# Patient Record
Sex: FEMALE | Race: WHITE | NOT HISPANIC OR LATINO | Employment: FULL TIME | URBAN - METROPOLITAN AREA
[De-identification: names, ages, dates, MRNs, and addresses within clinical notes are randomized per-mention and may not be internally consistent; named-entity substitution may affect disease eponyms.]

---

## 2017-01-08 ENCOUNTER — OFFICE VISIT (OUTPATIENT)
Dept: URGENT CARE | Facility: PHYSICIAN GROUP | Age: 57
End: 2017-01-08
Payer: COMMERCIAL

## 2017-01-08 VITALS
HEART RATE: 77 BPM | OXYGEN SATURATION: 95 % | RESPIRATION RATE: 18 BRPM | SYSTOLIC BLOOD PRESSURE: 124 MMHG | TEMPERATURE: 97.8 F | WEIGHT: 173 LBS | BODY MASS INDEX: 31.83 KG/M2 | HEIGHT: 62 IN | DIASTOLIC BLOOD PRESSURE: 88 MMHG

## 2017-01-08 DIAGNOSIS — J40 BRONCHITIS: ICD-10-CM

## 2017-01-08 PROCEDURE — 99214 OFFICE O/P EST MOD 30 MIN: CPT | Performed by: EMERGENCY MEDICINE

## 2017-01-08 RX ORDER — SOLIFENACIN SUCCINATE 5 MG/1
10 TABLET, FILM COATED ORAL DAILY
COMMUNITY
End: 2017-06-12 | Stop reason: SDUPTHER

## 2017-01-08 RX ORDER — METHYLPREDNISOLONE 4 MG/1
TABLET ORAL
Qty: 21 TAB | Refills: 0 | Status: SHIPPED | OUTPATIENT
Start: 2017-01-08 | End: 2017-04-03

## 2017-01-08 RX ORDER — BENZONATATE 100 MG/1
100 CAPSULE ORAL 3 TIMES DAILY PRN
Qty: 60 CAP | Refills: 0 | Status: SHIPPED | OUTPATIENT
Start: 2017-01-08 | End: 2017-02-16

## 2017-01-08 RX ORDER — AZITHROMYCIN 250 MG/1
TABLET, FILM COATED ORAL
Qty: 6 TAB | Refills: 0 | Status: SHIPPED | OUTPATIENT
Start: 2017-01-08 | End: 2017-04-03

## 2017-01-08 ASSESSMENT — ENCOUNTER SYMPTOMS
SORE THROAT: 1
NERVOUS/ANXIOUS: 0
WEIGHT LOSS: 0
CHILLS: 0
EYE DISCHARGE: 0
SPUTUM PRODUCTION: 1
COUGH: 1
SENSORY CHANGE: 0
FEVER: 0
NAUSEA: 0
HEARTBURN: 0
PALPITATIONS: 0
EYE PAIN: 0
HEADACHES: 0
MYALGIAS: 0
SHORTNESS OF BREATH: 0
SWEATS: 0
NECK PAIN: 0

## 2017-01-08 NOTE — Clinical Note
January 8, 2017        Koki Townsend  [facility] [place] [address]        Dear Koki:    Please ask to be allowed to stay home from  work for the next two days,    If you have any questions or concerns, please don't hesitate to call.        Sincerely,        SHERRY Beyer M.D.    Electronically Signed

## 2017-01-08 NOTE — MR AVS SNAPSHOT
"Koki Townsend   2017 9:00 AM   Office Visit   MRN: [id number]    Department:  [facility]   Dept Phone:  [phone]    Description:  Female : 1960   Provider:  SHERRY Beyer M.D.           Reason for Visit     Cough with loss of voice, congestion, headache x 3 weeks       Allergies as of 2017     Allergen Noted Reactions    Codeine 2012   Itching      You were diagnosed with     Bronchitis   [002492]         Vital Signs     Blood Pressure Pulse Temperature Respirations Height Weight    124/88 mmHg 77 36.6 °C (97.8 °F) 18 1.575 m (5' 2\") 78.472 kg (173 lb)    Body Mass Index Oxygen Saturation Smoking Status             31.63 kg/m2 95% Passive Smoke Exposure - Never Smoker         Basic Information     Date Of Birth Sex Race Ethnicity Preferred Language    1960 Female White Non- English      Your appointments     2017  7:45 PM   Sleep Study Diagnostic with SLEEP TECH   [facility] (--)    [address]  [place] [id number] [phone]            2017  3:20 PM   Follow UP with DES Mario   [facility] (--)    [address]  [place] [id number] [phone]              Problem List              ICD-10-CM Priority Class Noted - Resolved    Impaired fasting blood sugar R73.01   2015 - Present    CKD (chronic kidney disease) stage 3, GFR 30-59 ml/min N18.3   2015 - Present    Other specified hypothyroidism E03.8   2015 - Present    Essential hypertension I10   2015 - Present    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 Williamson)    IMM DTaP/Tdap/Td Vaccine (2 - Td) 12/7/2025 12/7/2015            Current Immunizations     Influenza TIV (IM) 9/23/2016, 11/27/2014, 11/15/2013    Influenza Vaccine Quad Inj (Preserved) 10/26/2015    Tdap Vaccine 12/7/2015      Below and/or attached are the medications your provider expects you to take. Review all of your home medications and newly ordered medications with your provider and/or pharmacist. Follow medication instructions as directed by your provider and/or pharmacist. Please keep your medication list with you and share with your provider. Update the information when medications are discontinued, doses are changed, or new medications (including over-the-counter products) are added; and carry medication information at all times in the event of emergency situations     Allergies:  CODEINE - Itching               Medications  Valid as of: January 08, 2017 -  9:23 AM    Generic Name Brand Name Tablet Size Instructions for use    Azithromycin (Tab) ZITHROMAX 250 MG uad        Benzonatate (Cap) TESSALON 100 MG Take 1 Cap by mouth 3 times a day as needed for Cough.        Cholecalciferol (Cap) Vitamin D3 2000 UNIT Take  by mouth every day.        Enalapril-Hydrochlorothiazide (Tab) VASERETIC 10-25 MG Take 0.5 Tabs by mouth every day.        Escitalopram Oxalate (Tab) LEXAPRO 20 MG Take 1 Tab by mouth every day.        Fluticasone Propionate (Suspension) FLONASE 50 MCG/ACT Spray 2 Sprays in nose every day.        Levothyroxine Sodium (Tab) SYNTHROID 100 MCG TAKE 1 TABLET BY MOUTH EVERY DAY        LORazepam (Tab) ATIVAN 1 MG Take 1 Tab by mouth at bedtime as needed for Anxiety.        MethylPREDNISolone (Tablet Therapy Pack) MEDROL DOSEPAK 4 MG uad        Simvastatin (Tab) ZOCOR 20 MG Take 1 Tab by mouth every evening.        Solifenacin Succinate (Tab) VESICARE 5 MG Take 10 mg by mouth every day.        Zolpidem Tartrate (Tab) AMBIEN 5 MG Take 1-2  tablets by mouth every evening as needed for insomnia. Bring to sleep study.        .                 Medicines prescribed today were sent to:     [id number]  [place], [place] - [address] AT [facility] [address]    [address] [place] [address]    Phone: [phone] Fax: [phone]    Open 24 Hours?: No    [id number] - [place], [place] - [address] AT [facility] OF [address]    [address] [place] [address]    Phone: [phone] Fax: [phone]    Open 24 Hours?: No      Medication refill instructions:       If your prescription bottle indicates you have medication refills left, it is not necessary to call your provider’s office. Please contact your pharmacy and they will refill your medication.    If your prescription bottle indicates you do not have any refills left, you may request refills at any time through one of the following ways: The online QuantConnect system (except Urgent Care), by calling your provider’s office, or by asking your pharmacy to contact your provider’s office with a refill request. Medication refills are processed only during regular business hours and may not be available until the next business day. Your provider may request additional information or to have a follow-up visit with you prior to refilling your medication.   *Please Note: Medication refills are assigned a new Rx number when refilled electronically. Your pharmacy may indicate that no refills were authorized even though a new prescription for the same medication is available at the pharmacy. Please request the medicine by name with the pharmacy before contacting your provider for a refill.           QuantConnect Access Code: Activation code not generated  Current QuantConnect Status: Active

## 2017-01-08 NOTE — PROGRESS NOTES
Subjective:      Koki Townsend is a 56 y.o. female who presents with Cough            Cough  This is a recurrent problem. The current episode started 1 to 4 weeks ago. The cough is productive of purulent sputum. Associated symptoms include nasal congestion and a sore throat. Pertinent negatives include no chest pain, chills, ear congestion, ear pain, fever, headaches, heartburn, myalgias, rash, shortness of breath, sweats or weight loss.     Allergies   Allergen Reactions   • Codeine Itching     Social History     Social History   • Marital Status: Single     Spouse Name: N/A   • Number of Children: N/A   • Years of Education: N/A     Occupational History   • Not on file.     Social History Main Topics   • Smoking status: Passive Smoke Exposure - Never Smoker   • Smokeless tobacco: Never Used   • Alcohol Use: Yes      Comment: rarely   • Drug Use: No   • Sexual Activity:     Partners: Male     Birth Control/ Protection: Post-Menopausal     Other Topics Concern   • Not on file     Social History Narrative     Past Medical History   Diagnosis Date   • Anxiety    • Thyroid disease    • Hypertension    • 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    • Hypothyroidism    • Nasal drainage    • Sleep apnea    • Chickenpox    • Wolof measles    • Tonsillitis      Current Outpatient Prescriptions on File Prior to Visit   Medication Sig Dispense Refill   • lorazepam (ATIVAN) 1 MG Tab Take 1 Tab by mouth at bedtime as needed for Anxiety. 30 Tab 0   • Cholecalciferol (VITAMIN D3) 2000 UNIT Cap Take  by mouth every day.     • zolpidem (AMBIEN) 5 MG Tab Take 1-2 tablets by mouth every evening as needed for insomnia. Bring to sleep study. 3 Tab 0   • enalapril-hydrochlorthiazide (VASERETIC) 10-25 MG per tablet Take 0.5 Tabs by mouth every day. 90 Tab 0   • fluticasone (FLONASE) 50 MCG/ACT nasal spray Spray 2 Sprays in nose every day. (Patient not taking: Reported on 12/8/2016) 16 g 3   • levothyroxine  "(SYNTHROID) 100 MCG Tab TAKE 1 TABLET BY MOUTH EVERY DAY 90 Tab 1   • simvastatin (ZOCOR) 20 MG Tab Take 1 Tab by mouth every evening. 30 Tab 11   • escitalopram (LEXAPRO) 20 MG tablet Take 1 Tab by mouth every day. 90 Tab 3     No current facility-administered medications on file prior to visit.     Family History   Problem Relation Age of Onset   • Heart Failure Mother    • Diabetes Mother    • Cancer Father      lung-abest   • Lung Cancer Father    • Stroke Neg Hx    • Sleep Apnea Brother        Review of Systems   Constitutional: Negative for fever, chills and weight loss.   HENT: Positive for sore throat. Negative for ear pain.    Eyes: Negative for pain and discharge.   Respiratory: Positive for cough and sputum production. Negative for shortness of breath.    Cardiovascular: Negative for chest pain and palpitations.   Gastrointestinal: Negative for heartburn and nausea.   Genitourinary: Negative.    Musculoskeletal: Negative for myalgias and neck pain.   Skin: Negative for rash.   Neurological: Negative for sensory change and headaches.   Psychiatric/Behavioral: The patient is not nervous/anxious.           Objective:     /88 mmHg  Pulse 77  Temp(Src) 36.6 °C (97.8 °F)  Resp 18  Ht 1.575 m (5' 2\")  Wt 78.472 kg (173 lb)  BMI 31.63 kg/m2  SpO2 95%     Physical Exam   Constitutional: She appears well-nourished. No distress.   HENT:   Head: Atraumatic.   Right Ear: External ear normal.   Left Ear: External ear normal.   TMs appear normal no erythema.   Eyes: Conjunctivae are normal. Right eye exhibits no discharge. Left eye exhibits no discharge.   Neck: No tracheal deviation present. No thyromegaly present.   Cardiovascular: Normal rate.    Pulmonary/Chest: Effort normal.   Abdominal: She exhibits no distension. There is no tenderness.   Neurological: She is alert.   Skin: Skin is dry. She is not diaphoretic.   Psychiatric: She has a normal mood and affect. Her behavior is normal.   Vitals " reviewed.              Assessment/Plan:     1. Bronchitis/laryngitis      I am recommending the patient initiate/ continue hydration efforts including the use of a vaporizer/humidifier/ netti pot. I also recommend the pt, initiate Mucinex (if older than 4) Sudafed or Dayquil if not hypertensive. In addition the patient will initiate the prescribed prescription medication/s: Z-Wilmer, Tessalon Perles. Patient will increase the dose to 200 3 times a day has allergies to codeine and Vicodin with itching. If the patient's condition exacerbates with worsening dysphagia, shortness of breath, uncontrolled fever, headache or chest pressure he/she will return immediately to the urgent care or go to  the emergency department for further evaluation. Patient has been given a note for the next 2 days off. She will rest her voice use humidification and the steroids. Medrol Dosepak    SHERRY Beyer      - azithromycin (ZITHROMAX) 250 MG Tab; uad  Dispense: 6 Tab; Refill: 0  - benzonatate (TESSALON) 100 MG Cap; Take 1 Cap by mouth 3 times a day as needed for Cough.  Dispense: 60 Cap; Refill: 0  - MethylPREDNISolone (MEDROL DOSEPAK) 4 MG Tablet Therapy Pack; uad  Dispense: 21 Tab; Refill: 0

## 2017-01-09 RX ORDER — BENZONATATE 100 MG/1
CAPSULE ORAL
Refills: 0 | OUTPATIENT
Start: 2017-01-09

## 2017-01-10 RX ORDER — BENZONATATE 100 MG/1
CAPSULE ORAL
Refills: 0 | OUTPATIENT
Start: 2017-01-10

## 2017-01-11 ENCOUNTER — SLEEP STUDY (OUTPATIENT)
Dept: SLEEP MEDICINE | Facility: MEDICAL CENTER | Age: 57
End: 2017-01-11
Attending: INTERNAL MEDICINE
Payer: COMMERCIAL

## 2017-01-11 ENCOUNTER — OFFICE VISIT (OUTPATIENT)
Dept: MEDICAL GROUP | Facility: PHYSICIAN GROUP | Age: 57
End: 2017-01-11
Payer: COMMERCIAL

## 2017-01-11 VITALS
OXYGEN SATURATION: 96 % | HEART RATE: 74 BPM | DIASTOLIC BLOOD PRESSURE: 76 MMHG | RESPIRATION RATE: 18 BRPM | SYSTOLIC BLOOD PRESSURE: 128 MMHG | TEMPERATURE: 98.1 F

## 2017-01-11 DIAGNOSIS — J04.0 LARYNGITIS WITHOUT OBSTRUCTION, ACUTE: Primary | ICD-10-CM

## 2017-01-11 DIAGNOSIS — G47.10 HYPERSOMNOLENCE: ICD-10-CM

## 2017-01-11 DIAGNOSIS — G47.30 SLEEP APNEA, UNSPECIFIED TYPE: ICD-10-CM

## 2017-01-11 PROCEDURE — 95811 POLYSOM 6/>YRS CPAP 4/> PARM: CPT | Performed by: INTERNAL MEDICINE

## 2017-01-11 PROCEDURE — 99213 OFFICE O/P EST LOW 20 MIN: CPT | Performed by: NURSE PRACTITIONER

## 2017-01-11 ASSESSMENT — PATIENT HEALTH QUESTIONNAIRE - PHQ9: CLINICAL INTERPRETATION OF PHQ2 SCORE: 2

## 2017-01-11 NOTE — PROGRESS NOTES
Subjective:      Koki Townsend is a 56 y.o. female who presents with Laryngitis and Follow-Up            HPI  Koki is here today to follow-up on urgent care.    1. Laryngitis without obstruction, acute  Patient was seen in the urgent care on Sunday the eighth.  She was diagnosed with bronchitis and given azithromycin, Tessalon Perles and a Medrol Dosepak.  She states that she is feeling better although she continues to suffer from laryngitis and loss of voice.  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  She is here for checkup to ensure she is improving and to get a work note excusing her absence.    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      Resolved Ambulatory Problems     Diagnosis Date Noted   • Health examination of defined subpopulation 12/18/2012     Past Medical History   Diagnosis Date   • Anxiety    • Thyroid disease    • Hypertension    • 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    • Hypothyroidism    • Nasal drainage    • Sleep apnea    • Chickenpox    • Malaysian measles    • Tonsillitis        Review of Systems   HENT:        See HPI          Objective:     /76 mmHg  Pulse 74  Temp(Src) 36.7 °C (98.1 °F)  Resp 18  SpO2 96%     Physical Exam   Constitutional: She appears well-developed and well-nourished.   HENT:   Right Ear: External ear normal.   Left Ear: External ear normal.   Mouth/Throat: Posterior oropharyngeal erythema present. No oropharyngeal exudate or posterior oropharyngeal edema.   Eyes: Conjunctivae and EOM are normal. Pupils are equal, round, and reactive to light.   Neck: Normal  range of motion. Neck supple.   Cardiovascular: Normal rate.    Pulmonary/Chest: Effort normal.   Nursing note and vitals reviewed.              Assessment/Plan:     1. Laryngitis without obstruction, acute  No change in therapy plan  Work note given

## 2017-01-11 NOTE — Clinical Note
January 11, 2017         Patient: Koki Townsend   YOB: 1960   Date of Visit: 1/11/2017           To Whom it May Concern:    Koki Townsend was seen in my clinic on 1/11/2017. She may return to work on Monday, January 16, 2017.  Please excuse her from missed work due to acute illness.    If you have any questions or concerns, please don't hesitate to call.        Sincerely,           SCOOTER Carter.  Electronically Signed

## 2017-01-11 NOTE — MR AVS SNAPSHOT
Koki Cary   2017 1:00 PM   Office Visit   MRN: [id number]    Department:  [facility]   Dept Phone:  [phone]    Description:  Female : 1960   Provider:  NAHUM CarterPROE           Reason for Visit     Laryngitis     Follow-Up  17 seen by       Allergies as of 2017     Allergen Noted Reactions    Codeine 2012   Itching      You were diagnosed with     Laryngitis without obstruction, acute   [407272]  -  Primary       Vital Signs     Blood Pressure Pulse Temperature Respirations Oxygen Saturation Smoking Status    128/76 mmHg 74 36.7 °C (98.1 °F) 18 96% Passive Smoke Exposure - Never Smoker      Basic Information     Date Of Birth Sex Race Ethnicity Preferred Language    1960 Female White Non- English      Your appointments     2017  7:45 PM   Sleep Study Diagnostic with SLEEP TECH   [facility] (--)    [address]  [place] [id number] [phone]            2017  3:20 PM   Follow UP with DES Mario   [facility] (--)    [address]  [place] [id number] [phone]              Problem List              ICD-10-CM Priority Class Noted - Resolved    Impaired fasting blood sugar R73.01   2015 - Present    CKD (chronic kidney disease) stage 3, GFR 30-59 ml/min N18.3   2015 - Present    Other specified hypothyroidism E03.8   2015 - Present    Essential hypertension I10   2015 - Present    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 Williamson)    IMM DTaP/Tdap/Td Vaccine (2 - Td) 12/7/2025 12/7/2015            Current Immunizations     Influenza TIV (IM) 9/23/2016, 11/27/2014, 11/15/2013    Influenza Vaccine Quad Inj (Preserved) 10/26/2015    Tdap Vaccine 12/7/2015      Below and/or attached are the medications your provider expects you to take. Review all of your home medications and newly ordered medications with your provider and/or pharmacist. Follow medication instructions as directed by your provider and/or pharmacist. Please keep your medication list with you and share with your provider. Update the information when medications are discontinued, doses are changed, or new medications (including over-the-counter products) are added; and carry medication information at all times in the event of emergency situations     Allergies:  CODEINE - Itching               Medications  Valid as of: January 11, 2017 -  1:30 PM    Generic Name Brand Name Tablet Size Instructions for use    Azithromycin (Tab) ZITHROMAX 250 MG uad        Benzonatate (Cap) TESSALON 100 MG Take 1 Cap by mouth 3 times a day as needed for Cough.        Cholecalciferol (Cap) Vitamin D3 2000 UNIT Take  by mouth every day.        Enalapril-Hydrochlorothiazide (Tab) VASERETIC 10-25 MG Take 0.5 Tabs by mouth every day.        Escitalopram Oxalate (Tab) LEXAPRO 20 MG Take 1 Tab by mouth every day.        Fluticasone Propionate (Suspension) FLONASE 50 MCG/ACT Spray 2 Sprays in nose every day.        Levothyroxine Sodium (Tab) SYNTHROID 100 MCG TAKE 1 TABLET BY MOUTH EVERY DAY        LORazepam (Tab) ATIVAN 1 MG Take 1 Tab by mouth at bedtime as needed for Anxiety.        MethylPREDNISolone (Tablet Therapy Pack) MEDROL DOSEPAK 4 MG uad        Simvastatin (Tab) ZOCOR 20 MG Take 1 Tab by mouth every evening.        Solifenacin Succinate (Tab) VESICARE 5 MG Take 10 mg by mouth every day.        Zolpidem Tartrate (Tab) AMBIEN 5 MG Take 1-2 tablets by mouth every evening as needed for insomnia. Bring to  sleep study.        .                 Medicines prescribed today were sent to:     [id number] - [place], [place] - [address] AT [facility] [address]    [address] [place] [address]    Phone: [phone] Fax: [phone]    Open 24 Hours?: No    [facility] - [place], [place] - [address] AT [facility] OF [address]    [address] [place] [address]    Phone: [phone] Fax: [phone]    Open 24 Hours?: No    [id number] - [place], [place] - [address]    [address] [address]    Phone: [phone] Fax: [phone]    Open 24 Hours?: No      Medication refill instructions:       If your prescription bottle indicates you have medication refills left, it is not necessary to call your provider’s office. Please contact your pharmacy and they will refill your medication.    If your prescription bottle indicates you do not have any refills left, you may request refills at any time through one of the following ways: The online Desino system (except Urgent Care), by calling your provider’s office, or by asking your pharmacy to contact your provider’s office with a refill request. Medication refills are processed only during regular business hours and may not be available until the next business day. Your provider may request additional information or to have a follow-up visit with you prior to refilling your medication.   *Please Note: Medication refills are assigned a new Rx number when refilled electronically. Your pharmacy may indicate that no refills were authorized even though a new prescription for the same medication is available at the pharmacy. Please request the medicine by name with the pharmacy before contacting your provider for a refill.           Desino Access Code: Activation code not generated  Current Desino Status: Active

## 2017-01-12 RX ORDER — BENZONATATE 100 MG/1
CAPSULE ORAL
Qty: 60 CAP | Refills: 0 | Status: SHIPPED | OUTPATIENT
Start: 2017-01-12 | End: 2017-02-16 | Stop reason: SDUPTHER

## 2017-01-12 NOTE — PROCEDURES
CLINICAL COMMENTS:  The patient underwent a split night polysomnogram with a CPAP titration using the standard montage for measurement of parameters of sleep, respiratory events, movement abnormalities, heart rate and rhythm. A microphone was used to monitor snoring.    ANALYSIS: The diagnostic recording time was 221.9 minutes with a sleep period of 168.6 minutes.  Total sleep time was 141.5 minutes with a sleep efficiency of 63.8%.  The sleep latency was 53.3 minutes, and REM latency was N/A minutes.  The patient had 217 arousals in total, for an arousal index of 92.0.        RESPIRATORY: The patient had 60 apneas in total.  Of these, 55 were obstructive apneas, and 5 were central apneas.  This resulted in an apnea index (AI) of 25.4.  The patient had 163 hypopneas in total, which resulted in a hypopnea index of 69.1.  The overall AHI was 94.6, while the AHI during REM was N/A.  The supine AHI = N/A.    OXIMETRY: Oxygen saturation monitoring showed a mean SpO2 of 91.5% for the diagnostic part of the study, with a minimum oxygen saturation of 77.0%.  Oxygen saturations were below 89% for 34.0% of sleep time.    CARDIAC: The highest heart rate for the first part of the study was 88.0 beats per minute.  The average heart rate during sleep was 62.3 bpm, while the highest heart rate was 80.0 bpm.    LIMB MOVEMENTS: There were a total of 48 periodic limb movements during sleep, of which 16 were PLMS arousals.  This resulted in a PLMS index of 20.4 and a PLMS arousal index of 6.8.    TREATMENT    Treatment recording time was 269.0 minutes with a total sleep time of 256.5min.  The patient had an arousal index of 8.2.      RESPIRATORY: The patient had 1 obstructive apneas, 73 central apneas, and 123 hypopneas for an overall AHI was 46.1.    OXIMETRY: The mean SpO2 during treatment was 94.6%, with a minimum oxygen saturation of 90.0%.        Interpretation:    This is a split-night study.    In the diagnostic phase there is  fragmentation of sleep with a reduced sleep efficiency, prolonged sleep onset latency, and a marked elevation in the arousal and awakening index. There are frequent periodic limb movements and the periodic limb movement with arousal index is 6.8 events per hour. The apnea hypopnea index is 94.6 events per hour, including hypopnea and obstructive apnea events with rare central apneas. No supine sleep or REM sleep time was recorded. The lowest arterial oxygen saturation is 77% on room air. She spends 30% of the diagnostic time the saturation below 90%.    In the treatment phase of the study there is a dramatic improvement in sleep continuity with normalization of sleep efficiency. The periodic limb movements decrease in frequency. CPAP was adjusted across a pressure range of 4-12 cm water pressure. Hypopnea and central apnea episodes persisted through the titration. In the treatment phase there were 73 episodes of central apnea, one episode of obstructive apnea and 123 episodes of hypopnea. In the final pressure stage, the apnea hypopnea index was still 22.7 events per hour with a lowest arterial oxygen saturation of 92% on room air. That step in the titration included 36 minutes of REM sleep time, 46 minutes of non-REM sleep time, but no time in the supine body position.    Assessment: Very severe sleep apnea hypopnea with an apnea hypopnea index of 94.6 events per hour. Severe nocturnal hypoxemia with a lowest arterial oxygen saturation of 77% on room air. Fragmentation of sleep related in large part to the breathing events and markedly improved on treatment. There are some periodic limb movements that also interfere with sleep continuity. In the limited time available during this split night study, there is a significant, but incomplete, response to CPAP therapy. The study is limited by the absence of supine sleep time in the treatment phase. Frequent central apnea episodes appear which may be a treatment onset  phenomenon but raise the possibility of complex sleep apnea.    Recommendations: The best option would be repeat polysomnography dedicated to titration using higher expiratory pressure levels and BiPAP or servo adaptive BiPAP as needed. Auto titrating CPAP might be considered with a pressure range of perhaps 9-16 cm water but there is no guarantee that modality would effectively treat the residual hypopnea and central apnea events seen in this study. She did best with a small Air Fit P10 nasal pillow apparatus and heated humidification.

## 2017-01-12 NOTE — TELEPHONE ENCOUNTER
LOV 1/11/17. Jane, you saw this patient yesterday as a follow up from an urgent care appt. Didn't know if you would want to refill her Benzonatate. Please advise.

## 2017-01-23 DIAGNOSIS — E78.5 DYSLIPIDEMIA: ICD-10-CM

## 2017-01-23 RX ORDER — SIMVASTATIN 20 MG
20 TABLET ORAL EVERY EVENING
Qty: 30 TAB | Refills: 11 | OUTPATIENT
Start: 2017-01-23

## 2017-01-24 DIAGNOSIS — F32.A DEPRESSION, UNSPECIFIED DEPRESSION TYPE: ICD-10-CM

## 2017-01-24 DIAGNOSIS — F41.1 GAD (GENERALIZED ANXIETY DISORDER): ICD-10-CM

## 2017-01-24 DIAGNOSIS — I10 ESSENTIAL HYPERTENSION: ICD-10-CM

## 2017-01-24 RX ORDER — LORAZEPAM 1 MG/1
1 TABLET ORAL NIGHTLY PRN
Qty: 30 TAB | Refills: 0 | Status: SHIPPED
Start: 2017-01-24 | End: 2017-03-03 | Stop reason: SDUPTHER

## 2017-01-24 RX ORDER — ENALAPRIL MALEATE AND HYDROCHLOROTHIAZIDE 10; 25 MG/1; MG/1
0.5 TABLET ORAL DAILY
Qty: 90 TAB | Refills: 0 | Status: SHIPPED | OUTPATIENT
Start: 2017-01-24 | End: 2017-06-05 | Stop reason: SDUPTHER

## 2017-01-24 NOTE — TELEPHONE ENCOUNTER
Last Date Filled:12/08/16    Was the patient seen in the last year in this department? Yes       Does patient have an active prescription for medications requested? No     Received Request Via: Patient     history reviewed prior to writing prescription for controlled substance II, III, or IV per [place] bill .

## 2017-01-25 ENCOUNTER — SLEEP CENTER VISIT (OUTPATIENT)
Dept: SLEEP MEDICINE | Facility: MEDICAL CENTER | Age: 57
End: 2017-01-25
Payer: COMMERCIAL

## 2017-01-25 VITALS
BODY MASS INDEX: 32.57 KG/M2 | HEART RATE: 68 BPM | RESPIRATION RATE: 14 BRPM | HEIGHT: 62 IN | DIASTOLIC BLOOD PRESSURE: 78 MMHG | SYSTOLIC BLOOD PRESSURE: 124 MMHG | WEIGHT: 177 LBS | TEMPERATURE: 98.4 F

## 2017-01-25 DIAGNOSIS — G47.30 SLEEP APNEA, UNSPECIFIED TYPE: ICD-10-CM

## 2017-01-25 DIAGNOSIS — F51.04 CHRONIC INSOMNIA: ICD-10-CM

## 2017-01-25 DIAGNOSIS — G47.33 OBSTRUCTIVE SLEEP APNEA: ICD-10-CM

## 2017-01-25 DIAGNOSIS — I10 ESSENTIAL HYPERTENSION: ICD-10-CM

## 2017-01-25 PROCEDURE — 99213 OFFICE O/P EST LOW 20 MIN: CPT | Performed by: NURSE PRACTITIONER

## 2017-01-25 RX ORDER — ZOLPIDEM TARTRATE 5 MG/1
TABLET ORAL
Qty: 2 TAB | Refills: 0 | Status: SHIPPED | OUTPATIENT
Start: 2017-01-25 | End: 2017-04-03

## 2017-01-25 NOTE — MR AVS SNAPSHOT
"Koki Cary   2017 3:20 PM   Sleep Center Visit   MRN: [id number]    Department:  [facility]   Dept Phone:  [phone]    Description:  Female : 1960   Provider:  DES Mario           Reason for Visit     Follow-Up SS Results       Allergies as of 2017     Allergen Noted Reactions    Codeine 2012   Itching      You were diagnosed with     Obstructive sleep apnea   [709939]       Essential hypertension   [3962930]       Sleep apnea, unspecified type   [6402734]       Chronic insomnia   [256519]         Vital Signs     Blood Pressure Pulse Temperature Respirations Height Weight    124/78 mmHg 68 36.9 °C (98.4 °F) (Temporal) 14 1.575 m (5' 2.01\") 80.287 kg (177 lb)    Body Mass Index Smoking Status                32.37 kg/m2 Passive Smoke Exposure - Never Smoker          Basic Information     Date Of Birth Sex Race Ethnicity Preferred Language    1960 Female White Non- English      Your appointments     2017  7:05 PM   Sleep Study with SLEEP TECH   [facility] (--)    [address]  [address]  [place] [id number] [phone]            2017  3:20 PM   Established Patient Pul with DES Mario   [facility] (--)    [address]  [address]  [place] [id number] [phone]              Problem List              ICD-10-CM Priority Class Noted - Resolved    Impaired fasting blood sugar R73.01   2015 - Present    CKD (chronic kidney disease) stage 3, GFR 30-59 ml/min N18.3   2015 - Present    Other specified hypothyroidism E03.8   2015 - Present    Essential hypertension I10   2015 - Present    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 Williamson)    IMM DTaP/Tdap/Td Vaccine (2 - Td) 12/7/2025 12/7/2015            Current Immunizations     Influenza TIV (IM) 9/23/2016, 11/27/2014, 11/15/2013    Influenza Vaccine Quad Inj (Preserved) 10/26/2015    Tdap Vaccine 12/7/2015      Below and/or attached are the medications your provider expects you to take. Review all of your home medications and newly ordered medications with your provider and/or pharmacist. Follow medication instructions as directed by your provider and/or pharmacist. Please keep your medication list with you and share with your provider. Update the information when medications are discontinued, doses are changed, or new medications (including over-the-counter products) are added; and carry medication information at all times in the event of emergency situations     Allergies:  CODEINE - Itching               Medications  Valid as of: January 25, 2017 -  4:00 PM    Generic Name Brand Name Tablet Size Instructions for use    Azithromycin (Tab) ZITHROMAX 250 MG uad        Benzonatate (Cap) TESSALON 100 MG Take 1 Cap by mouth 3 times a day as needed for Cough.        Benzonatate (Cap) TESSALON 100 MG TAKE 1 CAPSULE BY MOUTH 3TIMES A DAY AS NEEDED FORCOUGH.        Cholecalciferol (Cap) Vitamin D3 2000 UNIT Take  by mouth every day.        Enalapril-Hydrochlorothiazide (Tab) VASERETIC 10-25 MG Take 0.5 Tabs by mouth every day.        Escitalopram Oxalate (Tab) LEXAPRO 20 MG Take 1 Tab by mouth every day.        Fluticasone Propionate (Suspension) FLONASE 50 MCG/ACT Spray 2 Sprays in nose every day.        Levothyroxine Sodium (Tab) SYNTHROID 100 MCG TAKE 1 TABLET BY MOUTH EVERY DAY        LORazepam (Tab) ATIVAN 1 MG Take 1 Tab by mouth at bedtime as needed for Anxiety.         MethylPREDNISolone (Tablet Therapy Pack) MEDROL DOSEPAK 4 MG uad        Simvastatin (Tab) ZOCOR 20 MG Take 1 Tab by mouth every evening.        Solifenacin Succinate (Tab) VESICARE 5 MG Take 10 mg by mouth every day.        Zolpidem Tartrate (Tab) AMBIEN 5 MG Take 1-2 tablets by mouth every evening as needed for insomnia. Bring to sleep study.        .                 Medicines prescribed today were sent to:     [id number]  [place] - [address] AT [facility] OF [facility]. & [address]    [address] [facility] [facility] [place] [address]    Phone: [phone] Fax: [phone]    Open 24 Hours?: No    [id number]  [place] - [address] AT [facility] OF [facility] & [address] [facility] [address]    Phone: [phone] Fax: [phone]    Open 24 Hours?: No    [id number] - [place], [place] - [address]    [address] [facility] [place] [address]    Phone: [phone] Fax: [phone]    Open 24 Hours?: No      Medication refill instructions:       If your prescription bottle indicates you have medication refills left, it is not necessary to call your provider’s office. Please contact your pharmacy and they will refill your medication.    If your prescription bottle indicates you do not have any refills left, you may request refills at any time through one of the following ways: The online [employer] system (except Urgent Care), by calling your provider’s office, or by asking your pharmacy to contact your provider’s office with a refill request. Medication refills are processed only during regular business hours and may not be available until the next business day. Your provider may request additional information or to have a follow-up visit with you prior to refilling your medication.   *Please Note: Medication refills are assigned a new Rx number when refilled electronically. Your pharmacy may indicate that no refills were authorized even though a new  prescription for the same medication is available at the pharmacy. Please request the medicine by name with the pharmacy before contacting your provider for a refill.        Your To Do List     Future Labs/Procedures Complete By Expires    POLYSOMNOGRAPHY TITRATION  As directed 1/25/2018    Comments:    Patient completed PSG split night an unsuccessful titration to CPAP, please titrate higher CPAP pressures and possibly bipap         MyChart Access Code: Activation code not generated  Current MyChart Status: Active

## 2017-01-26 NOTE — PROGRESS NOTES
Chief Complaint   Patient presents with   • Follow-Up     SS Results        HPI:  Koki Townsend is a 56 y.o. year old female here today for follow-up on sleep study results. About 21 years ago she underwent polysomnography elsewhere and was asked to start CPAP therapy. She was not able to acclimate to the CPAP and has been untreated for that problem over the years. She presents at this time with increasing severity of non-refreshing sleep, nocturnal awakenings and excessive daytime somnolence.    She has a regular sleep schedule, as confirmed by the sleep diary, with bedtime between 8:30 and 9 PM. She falls asleep in about 30 minutes but awakens 3-4 times a night, often with nocturia. She arises at 5:30 in the morning on work days but sleeps until 7-8 AM on days off. She is tired in the morning without overt grogginess or regular morning headaches. The spousal questionnaire indicates loud snoring and pauses in breathing terminated by resuscitative gasping and occasional sleepwalking. She is very sleepy during the day and regularly dozes off while reading or watching television, during movies and occasionally during conversations. Her [place] sleepiness score is markedly elevated at 17 points. She drinks carbonated soft drinks but does not otherwise use substances to induce sleep or to maintain wakefulness. She does not have symptoms suggesting narcolepsy, parasomnia or restless leg syndrome.    Nocturnal oximetry was done on August 15, 2016. It demonstrates cyclic drops in saturation to a minimum of 80% on room air. She spends 71 minutes of the study with a saturation below 90%.    Her past medical history is remarkable for hypothyroidism, stage III chronic kidney disease and abnormal liver associated enzymes. She does use lorazepam 1 mg up to 3 times a day as needed for anxiety. She takes enalapril for systemic arterial hypertension. She does have a brother with obstructive sleep apnea, successfully treated with  CPAP.    PSG split night 1/11/2017 indicated severe obstructive sleep apnea and AHI 94.6 and a minimum oxygen saturation of 77%. PLMS index of 20.4. CPAP titration was attempted but not completed. I reviewed testing with patient and treatment options. Due to her past intolerance of CPAP and incomplete titration, recommendation of in lab titration study was advised. She is amenable to this. We discussed mask options as well. She did require the use of sleep aide that night. She denies any changes in health since last OV.      ROS: As per HPI and otherwise negative if not stated.    Past Medical History   Diagnosis Date   • Anxiety    • Thyroid disease    • Hypertension    • 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    • Hypothyroidism    • Nasal drainage    • Sleep apnea    • Chickenpox    • [place] measles    • Tonsillitis        Past Surgical History   Procedure Laterality Date   • Thyroidectomy       Left lobe removed   • Sinuscope     • Tonsillectomy     • Tubal ligation         Family History   Problem Relation Age of Onset   • Heart Failure Mother    • Diabetes Mother    • Cancer Father      lung-abest   • Lung Cancer Father    • Stroke Neg Hx    • Sleep Apnea Brother        Social History     Social History   • Marital Status: Single     Spouse Name: N/A   • Number of Children: N/A   • Years of Education: N/A     Occupational History   • Not on file.     Social History Main Topics   • Smoking status: Passive Smoke Exposure - Never Smoker   • Smokeless tobacco: Never Used   • Alcohol Use: Yes      Comment: rarely   • Drug Use: No   • Sexual Activity:     Partners: Male     Birth Control/ Protection: Post-Menopausal     Other Topics Concern   • Not on file     Social History Narrative       Allergies as of 01/25/2017 - Jaswinder as Reviewed 01/25/2017   Allergen Reaction Noted   • Codeine Itching 12/18/2012        @Vital signs for this encounter:  Filed Vitals:    01/25/17 1526   Height:  "1.575 m (5' 2.01\")   Weight: 80.287 kg (177 lb)   Weight % change since last entry.: 0 %   BP: 124/78   Pulse: 68   BMI (Calculated): 32.37   Resp: 14   Temp: 36.9 °C (98.4 °F)   TempSrc: Temporal   O2 sat % room air: 94 %       Current medications as of today   Current Outpatient Prescriptions   Medication Sig Dispense Refill   • zolpidem (AMBIEN) 5 MG Tab Take 1-2 tablets by mouth every evening as needed for insomnia. Bring to sleep study. 2 Tab 0   • enalapril-hydrochlorthiazide (VASERETIC) 10-25 MG per tablet Take 0.5 Tabs by mouth every day. 90 Tab 0   • lorazepam (ATIVAN) 1 MG Tab Take 1 Tab by mouth at bedtime as needed for Anxiety. 30 Tab 0   • benzonatate (TESSALON) 100 MG Cap TAKE 1 CAPSULE BY MOUTH 3TIMES A DAY AS NEEDED FORCOUGH. 60 Cap 0   • solifenacin (VESICARE) 5 MG tablet Take 10 mg by mouth every day.     • azithromycin (ZITHROMAX) 250 MG Tab uad 6 Tab 0   • benzonatate (TESSALON) 100 MG Cap Take 1 Cap by mouth 3 times a day as needed for Cough. 60 Cap 0   • MethylPREDNISolone (MEDROL DOSEPAK) 4 MG Tablet Therapy Pack uad 21 Tab 0   • Cholecalciferol (VITAMIN D3) 2000 UNIT Cap Take  by mouth every day.     • fluticasone (FLONASE) 50 MCG/ACT nasal spray Spray 2 Sprays in nose every day. (Patient not taking: Reported on 12/8/2016) 16 g 3   • levothyroxine (SYNTHROID) 100 MCG Tab TAKE 1 TABLET BY MOUTH EVERY DAY 90 Tab 1   • simvastatin (ZOCOR) 20 MG Tab Take 1 Tab by mouth every evening. 30 Tab 11   • escitalopram (LEXAPRO) 20 MG tablet Take 1 Tab by mouth every day. 90 Tab 3     No current facility-administered medications for this visit.         Physical Exam:   Gen:           Alert and oriented, No apparent distress. Mood and affect appropriate, normal interaction with examiner.  Eyes:          PERRL, EOM intact, sclere white, conjunctive moist.  Ears:          Not examined.   Hearing:     Grossly intact.  Nose:          Normal, no lesions or deformities.  Dentition:    Good " dentition.  Oropharynx:   Tongue normal, posterior pharynx without erythema or exudate.  Mallampati Classification: 3  Neck:        Supple, trachea midline, no masses.  Respiratory Effort: No intercostal retractions or use of accessory muscles.   Lung Auscultation:      Clear to auscultation bilaterally; no rales, rhonchi or wheezing.  CV:            Regular rate and rhythm. No murmurs, rubs or gallops.  Abd:           Not examined.   Lymphadenopathy: Not examined,  Gait and Station: Normal.  Digits and Nails: No clubbing, cyanosis, petechiae, or nodes.   Cranial Nerves: II-XII grossly intact.  Skin:        No rashes, lesions or ulcers noted.               Ext:           No cyanosis or edema.      Assessment:  1. Obstructive sleep apnea  POLYSOMNOGRAPHY TITRATION   2. Essential hypertension     3. Sleep apnea, unspecified type  zolpidem (AMBIEN) 5 MG Tab   4. Chronic insomnia  zolpidem (AMBIEN) 5 MG Tab       Immunizations:    Flu:2016  Pneumovax 23:not given  Prevnar 13:not given    Plan:  1. STAT Titration study on CPAP/BIPAP now. RX for Ambien given.  2. Discussed sleep study.  3. Follow up after sleep study with any APRN, sooner if needed.

## 2017-01-30 ENCOUNTER — TELEPHONE (OUTPATIENT)
Dept: MEDICAL GROUP | Facility: PHYSICIAN GROUP | Age: 57
End: 2017-01-30

## 2017-01-30 NOTE — TELEPHONE ENCOUNTER
1. Caller Name: Koki Townsend                      Call Back Number: [phone] (home)       2. Message: Patient states she is feeling better but her cough isn't getting better on the tessalon, she wanted to know what else she can take. Please advise     3. Patient approves office to leave a detailed voicemail/MyChart message: yes

## 2017-01-30 NOTE — TELEPHONE ENCOUNTER
The cough is often the last thing to resolve.  It can last up to 6-8 weeks.  Encourage OTC remedies to help suppress the cough.  Thank you

## 2017-02-13 ENCOUNTER — PATIENT MESSAGE (OUTPATIENT)
Dept: MEDICAL GROUP | Facility: PHYSICIAN GROUP | Age: 57
End: 2017-02-13

## 2017-02-13 DIAGNOSIS — R05.3 CHRONIC COUGH: ICD-10-CM

## 2017-02-13 DIAGNOSIS — E78.5 DYSLIPIDEMIA: ICD-10-CM

## 2017-02-13 RX ORDER — SIMVASTATIN 20 MG
20 TABLET ORAL EVERY EVENING
Qty: 30 TAB | Refills: 0 | OUTPATIENT
Start: 2017-02-13

## 2017-02-16 RX ORDER — BENZONATATE 100 MG/1
100 CAPSULE ORAL 3 TIMES DAILY PRN
Qty: 60 CAP | Refills: 3 | Status: SHIPPED | OUTPATIENT
Start: 2017-02-16 | End: 2017-02-16

## 2017-02-16 RX ORDER — BENZONATATE 100 MG/1
100 CAPSULE ORAL 3 TIMES DAILY PRN
Qty: 60 CAP | Refills: 0 | Status: SHIPPED | OUTPATIENT
Start: 2017-02-16 | End: 2018-01-04

## 2017-02-16 NOTE — TELEPHONE ENCOUNTER
Was the patient seen in the last year in this department? Yes     Does patient have an active prescription for medications requested? No     Received Request Via: Patient Dorcas

## 2017-02-16 NOTE — TELEPHONE ENCOUNTER
From: Koki Townsend  To: ZIGGY Carter  Sent: 2/16/2017 12:57 PM PST  Subject: Medication Renewal Request    Original authorizing provider: ZIGGY Carter would like a refill of the following medications:  benzonatate (TESSALON) 100 MG Cap [ZIGGY Carter]    Preferred pharmacy: [id number] [place] - [address]    Comment:

## 2017-02-26 ENCOUNTER — SLEEP STUDY (OUTPATIENT)
Dept: SLEEP MEDICINE | Facility: MEDICAL CENTER | Age: 57
End: 2017-02-26
Attending: NURSE PRACTITIONER
Payer: COMMERCIAL

## 2017-02-26 DIAGNOSIS — G47.33 OBSTRUCTIVE SLEEP APNEA: ICD-10-CM

## 2017-02-26 PROCEDURE — 95811 POLYSOM 6/>YRS CPAP 4/> PARM: CPT | Performed by: INTERNAL MEDICINE

## 2017-02-27 NOTE — PROCEDURES
Clinical Comments:  The patient underwent a comprehensive polysomnogram using the standard montage for measurement of parameters of sleep, respiratory events, movement abnormalities, heart rate and rhythm. A microphone was used to monitor snoring.      ANALYSIS:  The total recording time was 408.3 minutes with a sleep period of 388.9 minutes and the total sleep time was 351.0 minutes with a sleep efficiency of 86.0%.  The sleep latency was 19.4 minutes, and REM latency was 150.5 minutes.  The patient experienced 21 arousals in total, for an arousal index of 3.6    RESPIRATORY: The patient had 14 apneas in total.  Of these, 3 were obstructive apneas, and 11 were central apneas.  This resulted in an apnea index (AI) of 2.4.  The patient had 78 hypopneas, for a hypopnea index of 13.3.  The overall AHI was 15.7, while the AHI during Stage R sleep was 14.8.  AHI while supine was 16.0.    OXIMETRY: Oxygen saturation monitoring showed a mean SpO2 of 94.5%, with a minimum oxygen saturation of 87.0%.  Oxygen saturations were less than or = 89% for 0.4 minutes of sleep time.    CARDIAC: The highest heart rate during the recording was 84.0 beats per minute.  The average heart rate during sleep was 69.3 bpm.    LIMB MOVEMENTS: There were a total of 0 PLMs during sleep, of which 0 were PLMs arousals.  This resulted in a PLMS index of 0.0.      Interpretation:    Ms. Townsend has a history of severe sleep apnea hypopnea syndrome. Previous split night polysomnography demonstrated an apnea hypopnea index of 94.6 events per hour with a lowest arterial oxygen saturation of 77% on room air. The study was designed to titrate therapy.    There is mild fragmentation of sleep with a reduced sleep efficiency and increased wake after sleep onset time. No periodic limb movements are identified.    CPAP was adjusted across a pressure range of 10-16 cm water with persistence of hypopnea events. She was then changed to BiPAP therapy to  facilitate comfort and titrated across a pressure range of 19-24/15-18 cm water. Scattered treatment onset central apnea episodes were identified. At the final BiPAP pressure, the apnea hypopnea index was 3.1 events per hour with a lowest arterial oxygen saturation of 92% on room air. That step in the titration included 30 minutes of REM sleep time, 9 minutes of non-REM sleep time, and was done in the supine body position.    Assessment:  This study demonstrates a good response to airway pressurization and a patient with previously demonstrated severe sleep apnea hypopnea. High airway pressures were required.    Recommendations:  BiPAP at 24/18 cm water pressure. She did best with a small Jenny View mask and heated humidification.

## 2017-02-28 ENCOUNTER — OFFICE VISIT (OUTPATIENT)
Dept: PULMONOLOGY | Facility: HOSPICE | Age: 57
End: 2017-02-28
Payer: COMMERCIAL

## 2017-02-28 VITALS
BODY MASS INDEX: 33.68 KG/M2 | RESPIRATION RATE: 16 BRPM | HEART RATE: 75 BPM | WEIGHT: 183 LBS | SYSTOLIC BLOOD PRESSURE: 158 MMHG | DIASTOLIC BLOOD PRESSURE: 90 MMHG | HEIGHT: 62 IN

## 2017-02-28 DIAGNOSIS — E03.9 HYPOTHYROIDISM, UNSPECIFIED TYPE: ICD-10-CM

## 2017-02-28 DIAGNOSIS — G47.33 OBSTRUCTIVE SLEEP APNEA: ICD-10-CM

## 2017-02-28 DIAGNOSIS — G47.00 INSOMNIA, UNSPECIFIED TYPE: ICD-10-CM

## 2017-02-28 DIAGNOSIS — I10 ESSENTIAL HYPERTENSION: ICD-10-CM

## 2017-02-28 PROCEDURE — 99213 OFFICE O/P EST LOW 20 MIN: CPT | Performed by: NURSE PRACTITIONER

## 2017-02-28 NOTE — PROGRESS NOTES
Chief Complaint   Patient presents with   • Follow-Up     SS results       HPI:  Koki Townsend is a 56 y.o. year old female here today for follow-up on sleep study results. About 21 years ago she underwent polysomnography elsewhere and was asked to start CPAP therapy. She was not able to acclimate to the CPAP and has been untreated for that problem over the years. She presents at this time with increasing severity of non-refreshing sleep, nocturnal awakenings and excessive daytime somnolence.    Her past medical history is remarkable for hypothyroidism, stage III chronic kidney disease and abnormal liver associated enzymes. She does use lorazepam 1 mg up to 3 times a day as needed for anxiety. She takes enalapril for systemic arterial hypertension. She does have a brother with obstructive sleep apnea, successfully treated with CPAP.    PSG split night 1/11/2017 indicated severe obstructive sleep apnea and AHI 94.6 and a minimum oxygen saturation of 77%. PLMS index of 20.4. CPAP titration was attempted but not completed. Titration study 2/26/17 indicates successful titration to BIPAP 23/19cm H20 with normal oximetry. She tolerated mask well. She achieved REM sleep while supine on this setting. No PLMs. I reviewed testing with patient and she is amenable to starting therapy.    She denies any changes in health since last OV. Her PCP continues to manage her cough - it has improved with antihistamine use. She denies fever, chills, night sweats, chest pain, swelling, dyspnea, wheezing, phlegm or hemoptysis. She denies GERD, sinus issues or dizziness on a regular basis.    ROS: As per HPI and otherwise negative if not stated.    Past Medical History   Diagnosis Date   • Anxiety    • Thyroid disease    • Hypertension    • 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    • Hypothyroidism    • Nasal drainage    • Sleep apnea    • Chickenpox    • [place] measles    • Tonsillitis        Past Surgical  "History   Procedure Laterality Date   • Thyroidectomy       Left lobe removed   • Sinuscope     • Tonsillectomy     • Tubal ligation         Family History   Problem Relation Age of Onset   • Heart Failure Mother    • Diabetes Mother    • Cancer Father      lung-abest   • Lung Cancer Father    • Stroke Neg Hx    • Sleep Apnea Brother        Social History     Social History   • Marital Status: Single     Spouse Name: N/A   • Number of Children: N/A   • Years of Education: N/A     Occupational History   • Not on file.     Social History Main Topics   • Smoking status: Passive Smoke Exposure - Never Smoker   • Smokeless tobacco: Never Used   • Alcohol Use: Yes      Comment: rarely   • Drug Use: No   • Sexual Activity:     Partners: Male     Birth Control/ Protection: Post-Menopausal     Other Topics Concern   • Not on file     Social History Narrative       Allergies as of 02/28/2017 - Jaswinder as Reviewed 02/28/2017   Allergen Reaction Noted   • Codeine Itching 12/18/2012        @Vital signs for this encounter:  Filed Vitals:    02/28/17 1518   Height: 1.575 m (5' 2\")   Weight: 83.008 kg (183 lb)   Weight % change since last entry.: 0 %   BP: 158/90   Pulse: 75   BMI (Calculated): 33.47   Resp: 16   O2 sat % room air: 95 %       Current medications as of today   Current Outpatient Prescriptions   Medication Sig Dispense Refill   • benzonatate (TESSALON) 100 MG Cap Take 1 Cap by mouth 3 times a day as needed for Cough. 60 Cap 0   • zolpidem (AMBIEN) 5 MG Tab Take 1-2 tablets by mouth every evening as needed for insomnia. Bring to sleep study. 2 Tab 0   • enalapril-hydrochlorthiazide (VASERETIC) 10-25 MG per tablet Take 0.5 Tabs by mouth every day. 90 Tab 0   • lorazepam (ATIVAN) 1 MG Tab Take 1 Tab by mouth at bedtime as needed for Anxiety. 30 Tab 0   • solifenacin (VESICARE) 5 MG tablet Take 10 mg by mouth every day.     • MethylPREDNISolone (MEDROL DOSEPAK) 4 MG Tablet Therapy Pack uad 21 Tab 0   • Cholecalciferol " (VITAMIN D3) 2000 UNIT Cap Take  by mouth every day.     • levothyroxine (SYNTHROID) 100 MCG Tab TAKE 1 TABLET BY MOUTH EVERY DAY 90 Tab 1   • simvastatin (ZOCOR) 20 MG Tab Take 1 Tab by mouth every evening. 30 Tab 11   • escitalopram (LEXAPRO) 20 MG tablet Take 1 Tab by mouth every day. 90 Tab 3   • azithromycin (ZITHROMAX) 250 MG Tab uad (Patient not taking: Reported on 2/28/2017) 6 Tab 0   • fluticasone (FLONASE) 50 MCG/ACT nasal spray Spray 2 Sprays in nose every day. (Patient not taking: Reported on 12/8/2016) 16 g 3     No current facility-administered medications for this visit.         Physical Exam:   Gen:           Alert and oriented, No apparent distress. Mood and affect appropriate, normal interaction with examiner.  Eyes:          PERRL, EOM intact, sclere white, conjunctive moist.  Ears:          Not examined.   Hearing:     Grossly intact.  Nose:          Normal, no lesions or deformities.  Dentition:    Good dentition.  Oropharynx:   Tongue normal, posterior pharynx without erythema or exudate.  Mallampati Classification: 3  Neck:        Supple, trachea midline, no masses.  Respiratory Effort: No intercostal retractions or use of accessory muscles.   Lung Auscultation:      Clear to auscultation bilaterally; no rales, rhonchi or wheezing.  CV:            Regular rate and rhythm. No murmurs, rubs or gallops.  Abd:           Not examined.   Lymphadenopathy: Not examined.  Gait and Station: Normal.  Digits and Nails: No clubbing, cyanosis, petechiae, or nodes.   Cranial Nerves: II-XII grossly intact.  Skin:        No rashes, lesions or ulcers noted.               Ext:           No cyanosis or edema.      Assessment:  1. Obstructive sleep apnea     2. Insomnia, unspecified type     3. Essential hypertension     4. Hypothyroidism, unspecified type         Immunizations:    Flu:2016  Pneumovax 23:not given  Prevnar 13:not given    Plan:  1. STAT order BIPAP 23/18cm H20 w/Small respironics Jenny Perez  mask.  2. Discussed sleep hygiene.  3. May add OTC sleep aide to help acclimate to therapy.  4. Follow up in 6 weeks with compliance card, sooner if needed.

## 2017-02-28 NOTE — MR AVS SNAPSHOT
"Koki Cary   2017 3:20 PM   Office Visit   MRN: [id number]    Department:  [facility]   Dept Phone:  [phone]    Description:  Female : 1960   Provider:  DES Mario           Reason for Visit     Follow-Up SS results      Allergies as of 2017     Allergen Noted Reactions    Codeine 2012   Itching      You were diagnosed with     Obstructive sleep apnea   [130076]       Insomnia, unspecified type   [4388686]       Essential hypertension   [1315883]       Hypothyroidism, unspecified type   [9729381]         Vital Signs     Blood Pressure Pulse Respirations Height Weight Body Mass Index    158/90 mmHg 75 16 1.575 m (5' 2\") 83.008 kg (183 lb) 33.46 kg/m2    Smoking Status                   Passive Smoke Exposure - Never Smoker           Basic Information     Date Of Birth Sex Race Ethnicity Preferred Language    1960 Female White Non- English      Your appointments     2017 10:40 AM   Follow UP with DES Mario   [facility] (--)    [address] [place] [id number]   [phone]              Problem List              ICD-10-CM Priority Class Noted - Resolved    Impaired fasting blood sugar R73.01   2015 - Present    CKD (chronic kidney disease) stage 3, GFR 30-59 ml/min N18.3   2015 - Present    Other specified hypothyroidism E03.8   2015 - Present    Essential hypertension I10   2015 - Present    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 Williamson)    IMM DTaP/Tdap/Td Vaccine (2 - Td) 12/7/2025 12/7/2015            Current Immunizations     Influenza TIV (IM) 9/23/2016, 11/27/2014, 11/15/2013    Influenza Vaccine Quad Inj (Preserved) 10/26/2015    Tdap Vaccine 12/7/2015      Below and/or attached are the medications your provider expects you to take. Review all of your home medications and newly ordered medications with your provider and/or pharmacist. Follow medication instructions as directed by your provider and/or pharmacist. Please keep your medication list with you and share with your provider. Update the information when medications are discontinued, doses are changed, or new medications (including over-the-counter products) are added; and carry medication information at all times in the event of emergency situations     Allergies:  CODEINE - Itching               Medications  Valid as of: February 28, 2017 -  4:25 PM    Generic Name Brand Name Tablet Size Instructions for use    Azithromycin (Tab) ZITHROMAX 250 MG uad        Benzonatate (Cap) TESSALON 100 MG Take 1 Cap by mouth 3 times a day as needed for Cough.        Cholecalciferol (Cap) Vitamin D3 2000 UNIT Take  by mouth every day.        Enalapril-Hydrochlorothiazide (Tab) VASERETIC 10-25 MG Take 0.5 Tabs by mouth every day.        Escitalopram Oxalate (Tab) LEXAPRO 20 MG Take 1 Tab by mouth every day.        Fluticasone Propionate (Suspension) FLONASE 50 MCG/ACT Spray 2 Sprays in nose every day.        Levothyroxine Sodium (Tab) SYNTHROID 100 MCG TAKE 1 TABLET BY MOUTH EVERY DAY        LORazepam (Tab) ATIVAN 1 MG Take 1 Tab by mouth at bedtime as needed for Anxiety.        MethylPREDNISolone (Tablet Therapy Pack) MEDROL DOSEPAK 4 MG uad        Simvastatin (Tab) ZOCOR 20 MG Take 1 Tab by mouth every evening.        Solifenacin Succinate (Tab) VESICARE 5 MG Take 10 mg by mouth every day.        Zolpidem Tartrate  (Tab) AMBIEN 5 MG Take 1-2 tablets by mouth every evening as needed for insomnia. Bring to sleep study.        .                 Medicines prescribed today were sent to:     [id number] - [place], [place] - [address]    [address] [facility] [address]    Phone: [phone] Fax: [phone]    Open 24 Hours?: No    [id number] - [place], [place] - [address] AT [facility]    [address] [place] [address]    Phone: [phone] Fax: [phone]    Open 24 Hours?: No      Medication refill instructions:       If your prescription bottle indicates you have medication refills left, it is not necessary to call your provider’s office. Please contact your pharmacy and they will refill your medication.    If your prescription bottle indicates you do not have any refills left, you may request refills at any time through one of the following ways: The online Roambi system (except Urgent Care), by calling your provider’s office, or by asking your pharmacy to contact your provider’s office with a refill request. Medication refills are processed only during regular business hours and may not be available until the next business day. Your provider may request additional information or to have a follow-up visit with you prior to refilling your medication.   *Please Note: Medication refills are assigned a new Rx number when refilled electronically. Your pharmacy may indicate that no refills were authorized even though a new prescription for the same medication is available at the pharmacy. Please request the medicine by name with the pharmacy before contacting your provider for a refill.           Roambi Access Code: Activation code not generated  Current Roambi Status: Active

## 2017-03-03 DIAGNOSIS — F32.A DEPRESSION, UNSPECIFIED DEPRESSION TYPE: ICD-10-CM

## 2017-03-03 DIAGNOSIS — F41.1 GAD (GENERALIZED ANXIETY DISORDER): ICD-10-CM

## 2017-03-03 RX ORDER — LORAZEPAM 1 MG/1
1 TABLET ORAL NIGHTLY PRN
Qty: 30 TAB | Refills: 0 | Status: SHIPPED
Start: 2017-03-03 | End: 2017-04-03 | Stop reason: SDUPTHER

## 2017-03-03 NOTE — TELEPHONE ENCOUNTER
Was the patient seen in the last year in this department? Yes 01/11/2017    Does patient have an active prescription for medications requested? No     Received Request Via: Patient

## 2017-03-03 NOTE — TELEPHONE ENCOUNTER
Pt called to check status.  She states she called pharmacy beginning of week and they told her they had faxed request a few times.  She is out now and hoping to get filled today.

## 2017-03-30 DIAGNOSIS — F32.A DEPRESSION, UNSPECIFIED DEPRESSION TYPE: ICD-10-CM

## 2017-03-30 DIAGNOSIS — F41.1 GAD (GENERALIZED ANXIETY DISORDER): ICD-10-CM

## 2017-03-30 NOTE — TELEPHONE ENCOUNTER
Was the patient seen in the last year in this department? Yes 01/11/2017    Does patient have an active prescription for medications requested? No     Received Request Via: Pharmacy

## 2017-03-31 DIAGNOSIS — G47.33 OBSTRUCTIVE SLEEP APNEA: ICD-10-CM

## 2017-03-31 RX ORDER — LORAZEPAM 1 MG/1
1 TABLET ORAL NIGHTLY PRN
Qty: 30 TAB | Refills: 0
Start: 2017-03-31

## 2017-03-31 RX ORDER — ZOLPIDEM TARTRATE 5 MG/1
5 TABLET ORAL NIGHTLY PRN
Qty: 30 TAB | OUTPATIENT
Start: 2017-03-31

## 2017-03-31 RX ORDER — ZOLPIDEM TARTRATE 5 MG/1
5 TABLET ORAL NIGHTLY PRN
COMMUNITY
End: 2017-04-03

## 2017-03-31 NOTE — TELEPHONE ENCOUNTER
Please call Koki.  I received her request for lorazepam renewal.  She should follow back up with Jane for a refill.  This medication is contraindicated in sleep apnea and it appears that Koki has had some trouble with day time sleepiness which is made worse by the medication.

## 2017-03-31 NOTE — TELEPHONE ENCOUNTER
Pt called and said she saw you the other day. She is feeling claustrophobic and you guys discussed starting her on Zolpidem. She is already on lorazepam. Please advise. I abstracted the medication already

## 2017-04-03 ENCOUNTER — OFFICE VISIT (OUTPATIENT)
Dept: MEDICAL GROUP | Facility: PHYSICIAN GROUP | Age: 57
End: 2017-04-03
Payer: COMMERCIAL

## 2017-04-03 VITALS
HEIGHT: 62 IN | WEIGHT: 177 LBS | BODY MASS INDEX: 32.57 KG/M2 | RESPIRATION RATE: 16 BRPM | HEART RATE: 72 BPM | DIASTOLIC BLOOD PRESSURE: 80 MMHG | SYSTOLIC BLOOD PRESSURE: 122 MMHG | OXYGEN SATURATION: 97 % | TEMPERATURE: 98.8 F

## 2017-04-03 DIAGNOSIS — Z12.11 SCREENING FOR COLON CANCER: ICD-10-CM

## 2017-04-03 DIAGNOSIS — E66.9 OBESITY (BMI 30.0-34.9): ICD-10-CM

## 2017-04-03 DIAGNOSIS — N18.30 CKD (CHRONIC KIDNEY DISEASE) STAGE 3, GFR 30-59 ML/MIN (HCC): ICD-10-CM

## 2017-04-03 DIAGNOSIS — F51.04 CHRONIC INSOMNIA: Primary | ICD-10-CM

## 2017-04-03 DIAGNOSIS — R73.01 IMPAIRED FASTING BLOOD SUGAR: ICD-10-CM

## 2017-04-03 DIAGNOSIS — F41.1 GAD (GENERALIZED ANXIETY DISORDER): ICD-10-CM

## 2017-04-03 DIAGNOSIS — E03.8 OTHER SPECIFIED HYPOTHYROIDISM: ICD-10-CM

## 2017-04-03 PROBLEM — E03.9 HYPOTHYROIDISM: Status: RESOLVED | Noted: 2017-02-28 | Resolved: 2017-04-03

## 2017-04-03 PROCEDURE — 99214 OFFICE O/P EST MOD 30 MIN: CPT | Performed by: NURSE PRACTITIONER

## 2017-04-03 RX ORDER — LORAZEPAM 1 MG/1
1 TABLET ORAL NIGHTLY PRN
Qty: 30 TAB | Refills: 0 | Status: SHIPPED
Start: 2017-04-03 | End: 2017-05-03 | Stop reason: SDUPTHER

## 2017-04-03 NOTE — MR AVS SNAPSHOT
"Yamilethara Cary   4/3/2017 3:40 PM   Office Visit   MRN: [id number]    Department:  [facility]   Dept Phone:  [phone]    Description:  Female : 1960   Provider:  ZIGGY Carter           Reason for Visit     Medication Refill lorazepam      Allergies as of 4/3/2017     Allergen Noted Reactions    Codeine 2012   Itching      You were diagnosed with     Chronic insomnia   [742847]  -  Primary     ARMEN (generalized anxiety disorder)   [407721]       Impaired fasting blood sugar   [050113]       Other specified hypothyroidism   [6214669]       CKD (chronic kidney disease) stage 3, GFR 30-59 ml/min   [747222]       Screening for colon cancer   [509227]         Vital Signs     Blood Pressure Pulse Temperature Respirations Height Weight    122/80 mmHg 72 37.1 °C (98.8 °F) 16 1.575 m (5' 2.01\") 80.287 kg (177 lb)    Body Mass Index Oxygen Saturation Smoking Status             32.37 kg/m2 97% Passive Smoke Exposure - Never Smoker         Basic Information     Date Of Birth Sex Race Ethnicity Preferred Language    1960 Female White Non- English      Your appointments     2017 10:40 AM   Follow UP with DES Mario   [facility] (--)    [address] [facility]  [place] [id number] [phone]              Problem List              ICD-10-CM Priority Class Noted - Resolved    Impaired fasting blood sugar R73.01   2015 - Present    CKD (chronic kidney disease) stage 3, GFR 30-59 ml/min N18.3   2015 - Present    Other specified hypothyroidism E03.8   2015 - Present    Essential hypertension I10   2015 - Present    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 Williamson)    IMM DTaP/Tdap/Td Vaccine (2 - Td) 12/7/2025 12/7/2015            Current Immunizations     Influenza TIV (IM) 9/23/2016, 11/27/2014, 11/15/2013    Influenza Vaccine Quad Inj (Preserved) 10/26/2015    Tdap Vaccine 12/7/2015      Below and/or attached are the medications your provider expects you to take. Review all of your home medications and newly ordered medications with your provider and/or pharmacist. Follow medication instructions as directed by your provider and/or pharmacist. Please keep your medication list with you and share with your provider. Update the information when medications are discontinued, doses are changed, or new medications (including over-the-counter products) are added; and carry medication information at all times in the event of emergency situations     Allergies:  CODEINE - Itching               Medications  Valid as of: April 03, 2017 -  4:10 PM    Generic Name Brand Name Tablet Size Instructions for use    Benzonatate (Cap) TESSALON 100 MG Take 1 Cap by mouth 3 times a day as needed for Cough.        Cholecalciferol (Cap) Vitamin D3 2000 UNIT Take  by mouth every day.        Enalapril-Hydrochlorothiazide (Tab) VASERETIC 10-25 MG Take 0.5 Tabs by mouth every day.        Escitalopram Oxalate (Tab) LEXAPRO 20 MG Take 1 Tab by mouth every day.        Levothyroxine Sodium (Tab) SYNTHROID 100 MCG TAKE 1 TABLET BY MOUTH EVERY DAY        LORazepam (Tab) ATIVAN 1 MG Take 1 Tab by mouth at bedtime as needed for Anxiety.        Simvastatin (Tab) ZOCOR 20 MG Take 1 Tab by mouth every evening.        Solifenacin Succinate (Tab) VESICARE 5 MG Take 10 mg by mouth every day.        .                 Medicines prescribed today were sent to:     [id number] - [place] -  [address]    [address] [facility] [place] [address]    Phone: [phone] Fax: [phone]    Open 24 Hours?: No    [id number]  [place], [place] - [address] [facility] [address]    [address] [place] [address]    Phone: [phone] Fax: [phone]    Open 24 Hours?: No      Medication refill instructions:       If your prescription bottle indicates you have medication refills left, it is not necessary to call your provider’s office. Please contact your pharmacy and they will refill your medication.    If your prescription bottle indicates you do not have any refills left, you may request refills at any time through one of the following ways: The online Monesbat system (except Urgent Care), by calling your provider’s office, or by asking your pharmacy to contact your provider’s office with a refill request. Medication refills are processed only during regular business hours and may not be available until the next business day. Your provider may request additional information or to have a follow-up visit with you prior to refilling your medication.   *Please Note: Medication refills are assigned a new Rx number when refilled electronically. Your pharmacy may indicate that no refills were authorized even though a new prescription for the same medication is available at the pharmacy. Please request the medicine by name with the pharmacy before contacting your provider for a refill.        Your To Do List     Future Labs/Procedures Complete By Expires    COMP METABOLIC PANEL  As directed 4/3/2018    HEMOGLOBIN A1C  As directed 4/3/2018    LIPID PROFILE  As directed 4/3/2018    OCCULT BLOOD FECES IMMUNOASSAY  As directed 4/3/2018    TSH WITH REFLEX TO FT4  As directed 4/3/2018         Monesbat Access Code: Activation code not generated  Current Monesbat Status: Active

## 2017-04-03 NOTE — PROGRESS NOTES
Chief Complaint   Patient presents with   • Medication Refill     lorazepam       HISTORY OF PRESENT ILLNESS: Patient is a 56 y.o. female established patient who presents today to discuss medication refills:    1. Chronic insomnia 2. ARMEN (generalized anxiety disorder)  Patient relies on lorazepam often nightly to help with sleep and anxiety.  She also mentions that she is being tested for sleep apnea and is currently using his BiPAP machine that causes her to feel claustrophobic.  She is requesting a refill of the lorazepam.  She is no longer using the Ambien, as it was only used while the sleep studies were taking place.  She denies any morning grogginess or observe side effects.    3. Impaired fasting blood sugar  Patient is overdue for fasting labs, she has personal history of prediabetes.  She states that she is due to have labs drawn by her gynecologist and will have them drawn together.  She is not checking her blood sugars at home.    4. Other specified hypothyroidism  Patient continues to use levothyroxine 100 µg daily.  She reports some weight loss, although she has reduced the amount of soda that she intakes from 3 day down to 2 a day.  She denies any real change in her mood, bowel habits or skin texture.    5. CKD (chronic kidney disease) stage 3, GFR 30-59 ml/min    6. Screening for colon cancer    7. Obesity (BMI 30.0-34.9)    Allergies:Codeine    Current Outpatient Prescriptions Ordered in [facility]   Medication Sig Dispense Refill   • lorazepam (ATIVAN) 1 MG Tab Take 1 Tab by mouth at bedtime as needed for Anxiety. 30 Tab 0   • benzonatate (TESSALON) 100 MG Cap Take 1 Cap by mouth 3 times a day as needed for Cough. 60 Cap 0   • enalapril-hydrochlorthiazide (VASERETIC) 10-25 MG per tablet Take 0.5 Tabs by mouth every day. 90 Tab 0   • solifenacin (VESICARE) 5 MG tablet Take 10 mg by mouth every day.     • Cholecalciferol (VITAMIN D3) 2000 UNIT Cap Take  by mouth every day.     • levothyroxine (SYNTHROID)  100 MCG Tab TAKE 1 TABLET BY MOUTH EVERY DAY 90 Tab 1   • simvastatin (ZOCOR) 20 MG Tab Take 1 Tab by mouth every evening. 30 Tab 11   • escitalopram (LEXAPRO) 20 MG tablet Take 1 Tab by mouth every day. 90 Tab 3     No current Epic-ordered facility-administered medications on file.       Past Medical History   Diagnosis Date   • Anxiety    • Thyroid disease    • Hypertension    • 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    • Hypothyroidism    • Nasal drainage    • Sleep apnea    • Chickenpox    • [place] measles    • Tonsillitis        Social History   Substance Use Topics   • Smoking status: Passive Smoke Exposure - Never Smoker   • Smokeless tobacco: Never Used   • Alcohol Use: Yes      Comment: rarely       Family Status   Relation Status Death Age   • Mother Alive    • Father  59     Lung Cancer   • Sister Alive    • Brother Alive    • Brother Alive    • Son Alive    • Daughter Alive    • Daughter Alive      Family History   Problem Relation Age of Onset   • Heart Failure Mother    • Diabetes Mother    • Cancer Father      lung-abest   • Lung Cancer Father    • Stroke Neg Hx    • Sleep Apnea Brother        ROS: see above    Review of Systems   Constitutional: Negative for fever, chills, weight loss and malaise/fatigue.   HENT: Negative for ear pain, nosebleeds, congestion, sore throat and neck pain.    Eyes: Negative for blurred vision.   Respiratory: Negative for cough, sputum production, shortness of breath and wheezing.    Cardiovascular: Negative for chest pain, palpitations, orthopnea and leg swelling.   Gastrointestinal: Negative for heartburn, nausea, vomiting and abdominal pain.   Genitourinary: Negative for dysuria, urgency and frequency.   Musculoskeletal: Negative for myalgias, back pain and joint pain.   Skin: Negative for rash and itching.   Neurological: Negative for dizziness, tingling, tremors, sensory change, focal weakness and headaches.   Endo/Heme/Allergies:  "Does not bruise/bleed easily.   Psychiatric/Behavioral: Negative for depression, suicidal ideas and memory loss.  The patient is not nervous/anxious and does not have insomnia.        Exam:  Blood pressure 122/80, pulse 72, temperature 37.1 °C (98.8 °F), resp. rate 16, height 1.575 m (5' 2.01\"), weight 80.287 kg (177 lb), SpO2 97 %.  General:  Well nourished, well developed female in NAD  Head is grossly normal.  Neck: Thyroid is not enlarged.  Pulmonary: Normal effort.   Cardiovascular: Regular rate.   Extremities: no clubbing, cyanosis, or edema.  Psych:  Mood and affect are normal.  Answering questions appropriately with good eye contact.      Please note that this dictation was created using voice recognition software. I have made every reasonable attempt to correct obvious errors, but I expect that there are errors of grammar and possibly content that I did not discover before finalizing the note.    Assessment/Plan:    1. Chronic insomnia     2. ARMEN (generalized anxiety disorder)  lorazepam (ATIVAN) 1 MG Tab   3. Impaired fasting blood sugar  LIPID PROFILE    HEMOGLOBIN A1C    COMP METABOLIC PANEL   4. Other specified hypothyroidism  TSH WITH REFLEX TO FT4   5. CKD (chronic kidney disease) stage 3, GFR 30-59 ml/min  COMP METABOLIC PANEL   6. Screening for colon cancer  OCCULT BLOOD FECES IMMUNOASSAY   7. Obesity (BMI 30.0-34.9)  Patient identified as having weight management issue.  Appropriate orders and counseling given.     1.  Refill lorazepam as previously prescribed.  Discussed the possible interactions with other sedatives.  Patient assures me that she is only taking the lorazepam.  Consider urine drug screen with next refill.  2.  Overdue for fasting labs, orders given  3.  FIT testing supplies given   4.  Will contact patient with the above lab results, follow-up pending results.      "

## 2017-04-17 ENCOUNTER — PATIENT MESSAGE (OUTPATIENT)
Dept: MEDICAL GROUP | Facility: PHYSICIAN GROUP | Age: 57
End: 2017-04-17

## 2017-04-17 DIAGNOSIS — E66.9 OBESITY (BMI 30-39.9): ICD-10-CM

## 2017-04-21 ENCOUNTER — SLEEP CENTER VISIT (OUTPATIENT)
Dept: SLEEP MEDICINE | Facility: MEDICAL CENTER | Age: 57
End: 2017-04-21
Payer: COMMERCIAL

## 2017-04-21 VITALS
HEART RATE: 92 BPM | DIASTOLIC BLOOD PRESSURE: 89 MMHG | SYSTOLIC BLOOD PRESSURE: 118 MMHG | OXYGEN SATURATION: 94 % | WEIGHT: 174 LBS | BODY MASS INDEX: 32.02 KG/M2 | HEIGHT: 62 IN | RESPIRATION RATE: 16 BRPM

## 2017-04-21 DIAGNOSIS — N18.30 CKD (CHRONIC KIDNEY DISEASE) STAGE 3, GFR 30-59 ML/MIN (HCC): ICD-10-CM

## 2017-04-21 DIAGNOSIS — E03.8 OTHER SPECIFIED HYPOTHYROIDISM: ICD-10-CM

## 2017-04-21 DIAGNOSIS — G47.33 OBSTRUCTIVE SLEEP APNEA: ICD-10-CM

## 2017-04-21 DIAGNOSIS — G47.00 INSOMNIA, UNSPECIFIED TYPE: ICD-10-CM

## 2017-04-21 DIAGNOSIS — I10 ESSENTIAL HYPERTENSION: ICD-10-CM

## 2017-04-21 PROCEDURE — 99213 OFFICE O/P EST LOW 20 MIN: CPT | Performed by: NURSE PRACTITIONER

## 2017-04-21 RX ORDER — ZOLPIDEM TARTRATE 5 MG/1
TABLET ORAL
COMMUNITY
Start: 2017-02-02 | End: 2017-08-15

## 2017-04-21 RX ORDER — ESCITALOPRAM OXALATE 10 MG/1
TABLET ORAL
COMMUNITY
Start: 2017-03-27 | End: 2017-06-29

## 2017-04-21 NOTE — MR AVS SNAPSHOT
"Koki Townsend   2017 10:40 AM   Sleep Center Visit   MRN: [id number]    Department:  [facility]   Dept Phone:  [phone]    Description:  Female : 1960   Provider:  DES Mario           Reason for Visit     Follow-Up 6 wk w/chip      Allergies as of 2017     Allergen Noted Reactions    Codeine 2012   Itching      You were diagnosed with     Obstructive sleep apnea   [543648]       Insomnia, unspecified type   [1179122]       Essential hypertension   [6473786]       Other specified hypothyroidism   [7196530]       CKD (chronic kidney disease) stage 3, GFR 30-59 ml/min   [548596]       BMI 31.0-31.9,adult   [108114]         Vital Signs     Blood Pressure Pulse Respirations Height Weight Body Mass Index    118/89 mmHg 92 16 1.575 m (5' 2.01\") 78.926 kg (174 lb) 31.82 kg/m2    Oxygen Saturation Smoking Status                94% Passive Smoke Exposure - Never Smoker          Basic Information     Date Of Birth Sex Race Ethnicity Preferred Language    1960 Female White Non- English      Your appointments     2017  3:40 PM   Follow UP with DES Mario   [facility] (--)    [address] [place] [address]  [place] [id number] [phone]              Problem List              ICD-10-CM Priority Class Noted - Resolved    Impaired fasting blood sugar R73.01   2015 - Present    CKD (chronic kidney disease) stage 3, GFR 30-59 ml/min N18.3   2015 - Present    Other specified hypothyroidism E03.8   2015 - Present    Essential hypertension I10   2015 - Present    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 Williamson)    IMM DTaP/Tdap/Td Vaccine (2 - Td) 12/7/2025 12/7/2015            Current Immunizations     Influenza TIV (IM) 9/23/2016, 11/27/2014, 11/15/2013    Influenza Vaccine Quad Inj (Preserved) 10/26/2015    Tdap Vaccine 12/7/2015      Below and/or attached are the medications your provider expects you to take. Review all of your home medications and newly ordered medications with your provider and/or pharmacist. Follow medication instructions as directed by your provider and/or pharmacist. Please keep your medication list with you and share with your provider. Update the information when medications are discontinued, doses are changed, or new medications (including over-the-counter products) are added; and carry medication information at all times in the event of emergency situations     Allergies:  CODEINE - Itching               Medications  Valid as of: April 21, 2017 - 11:06 AM    Generic Name Brand Name Tablet Size Instructions for use    Benzonatate (Cap) TESSALON 100 MG Take 1 Cap by mouth 3 times a day as needed for Cough.        Cholecalciferol (Cap) Vitamin D3 2000 UNIT Take  by mouth every day.        Enalapril-Hydrochlorothiazide (Tab) VASERETIC 10-25 MG Take 0.5 Tabs by mouth every day.        Escitalopram Oxalate (Tab) LEXAPRO 20 MG Take 1 Tab by mouth every day.        Escitalopram Oxalate (Tab) LEXAPRO 10 MG         Levothyroxine Sodium (Tab) SYNTHROID 100 MCG TAKE 1 TABLET BY MOUTH EVERY DAY        LORazepam (Tab) ATIVAN 1 MG Take 1 Tab by mouth at bedtime as needed for Anxiety.        Simvastatin (Tab) ZOCOR 20 MG Take 1 Tab by mouth every evening.        Solifenacin Succinate (Tab) VESICARE 5 MG Take 10 mg by mouth every day.        Zolpidem Tartrate (Tab) AMBIEN 5 MG         .                  Medicines prescribed today were sent to:     [id number] - [place], [place] - [address]    [address] [place] [address]    Phone: [phone] Fax: [phone]    Open 24 Hours?: No    [id number] - [place], [place] - [address] AT [address]    [address] [place] [address]    Phone: [phone] Fax: [phone]    Open 24 Hours?: No      Medication refill instructions:       If your prescription bottle indicates you have medication refills left, it is not necessary to call your provider’s office. Please contact your pharmacy and they will refill your medication.    If your prescription bottle indicates you do not have any refills left, you may request refills at any time through one of the following ways: The online [email or web address] system (except Urgent Care), by calling your provider’s office, or by asking your pharmacy to contact your provider’s office with a refill request. Medication refills are processed only during regular business hours and may not be available until the next business day. Your provider may request additional information or to have a follow-up visit with you prior to refilling your medication.   *Please Note: Medication refills are assigned a new Rx number when refilled electronically. Your pharmacy may indicate that no refills were authorized even though a new prescription for the same medication is available at the pharmacy. Please request the medicine by name with the pharmacy before contacting your provider for a refill.           [email or web address] Access Code: Activation code not generated  Current [email or web address] Status: Active

## 2017-04-21 NOTE — PROGRESS NOTES
Chief Complaint   Patient presents with   • Follow-Up     6 wk w/chip       HPI:  Koki Townsend is a 56 y.o. year old female here today for follow-up on EVI. This is her first f/u since starting therapy. Her past medical history is remarkable for hypothyroidism, stage III chronic kidney disease and abnormal liver associated enzymes. She does use lorazepam 1 mg up to 3 times a day as needed for anxiety. She takes enalapril for systemic arterial hypertension. She does have a brother with obstructive sleep apnea, successfully treated with CPAP.    PSG split night 1/11/2017 indicated severe obstructive sleep apnea and AHI 94.6 and a minimum oxygen saturation of 77%. PLMS index of 20.4. CPAP titration was attempted but not completed. Titration study 2/26/17 indicates successful titration to BIPAP 23/19cm H20 with normal oximetry. She tolerated mask well. She achieved REM sleep while supine on this setting. No PLMs. Compliance card 3/23/17-4/20/17 indicates 100% compliance, avg nightly use of 7hr 40min, AHi 5.1 and minimal mask leak. She tolerates mask/pressure but notes feeling claustrophobic and requires temazepam to help with sleep. PCP has filled this. She also started using Melatonin OTC and notes falling asleep quicker since starting this and BIPAP therapy. She has also noted some condensation in mask and continues to adjust humidifier down. She overall feels better on therapy - more rested, energy consistent during the day and no headaches.     She denies any changes in health since last OV. Her PCP continues to manage her cough - it has improved with antihistamine use. She denies fever, chills, night sweats, chest pain, swelling, dyspnea, wheezing, phlegm or hemoptysis. She denies GERD, sinus issues or dizziness on a regular basis.     ROS: As per HPI and otherwise negative if not stated.    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    • Hypothyroidism    • Nasal drainage    • Sleep apnea    • Chickenpox    • Syriac measles    • Tonsillitis        Past Surgical History   Procedure Laterality Date   • Thyroidectomy       Left lobe removed   • Sinuscope     • Tonsillectomy     • Tubal ligation         Family History   Problem Relation Age of Onset   • Heart Failure Mother    • Diabetes Mother    • Cancer Father      lung-abest   • Lung Cancer Father    • Stroke Neg Hx    • Sleep Apnea Brother        Social History     Social History   • Marital Status: Single     Spouse Name: N/A   • Number of Children: N/A   • Years of Education: N/A     Occupational History   • Not on file.     Social History Main Topics   • Smoking status: Passive Smoke Exposure - Never Smoker   • Smokeless tobacco: Never Used   • Alcohol Use: 0.0 oz/week     0 Standard drinks or equivalent per week      Comment: rarely   • Drug Use: No   • Sexual Activity:     Partners: Male     Birth Control/ Protection: Post-Menopausal     Other Topics Concern   • Not on file     Social History Narrative       Allergies as of 04/21/2017 - Jaswinder as Reviewed 04/21/2017   Allergen Reaction Noted   • Codeine Itching 12/18/2012        @Vital signs for this encounter:  Filed Vitals:    04/21/17 1020   Height: 1.575 m (5' 2.01\")   Weight: 78.926 kg (174 lb)   Weight % change since last entry.: 0 %   BP: 118/89   Pulse: 92   BMI (Calculated): 31.82   Resp: 16       Current medications as of today   Current Outpatient Prescriptions   Medication Sig Dispense Refill   • escitalopram (LEXAPRO) 10 MG Tab      • lorazepam (ATIVAN) 1 MG Tab Take 1 Tab by mouth at bedtime as needed for Anxiety. 30 Tab 0   • benzonatate (TESSALON) 100 MG Cap Take 1 Cap by mouth 3 times a day as needed for Cough. 60 Cap 0   • enalapril-hydrochlorthiazide (VASERETIC) 10-25 MG per tablet Take 0.5 Tabs by mouth every day. 90 Tab 0   • solifenacin (VESICARE) 5 MG tablet Take 10 mg by " mouth every day.     • Cholecalciferol (VITAMIN D3) 2000 UNIT Cap Take  by mouth every day.     • levothyroxine (SYNTHROID) 100 MCG Tab TAKE 1 TABLET BY MOUTH EVERY DAY 90 Tab 1   • simvastatin (ZOCOR) 20 MG Tab Take 1 Tab by mouth every evening. 30 Tab 11   • escitalopram (LEXAPRO) 20 MG tablet Take 1 Tab by mouth every day. 90 Tab 3   • zolpidem (AMBIEN) 5 MG Tab        No current facility-administered medications for this visit.         Physical Exam:   Gen:           Alert and oriented, No apparent distress. Mood and affect appropriate, normal interaction with examiner.  Eyes:          PERRL, EOM intact, sclere white, conjunctive moist.  Ears:          Not examined.   Hearing:     Grossly intact.  Nose:          Normal, no lesions or deformities.  Dentition:    Good dentition.  Oropharynx:   Tongue normal, posterior pharynx without erythema or exudate.  Mallampati Classification: 3  Neck:        Supple, trachea midline, no masses.  Respiratory Effort: No intercostal retractions or use of accessory muscles.   Lung Auscultation:      Clear to auscultation bilaterally; no rales, rhonchi or wheezing.  CV:            Regular rate and rhythm. No murmurs, rubs or gallops.  Abd:           Not examined.   Lymphadenopathy: Not examined.  Gait and Station: Normal.  Digits and Nails: No clubbing, cyanosis, petechiae, or nodes.   Cranial Nerves: II-XII grossly intact.  Skin:        No rashes, lesions or ulcers noted.               Ext:           No cyanosis or edema.      Assessment:  1. Obstructive sleep apnea     2. Insomnia, unspecified type     3. Essential hypertension     4. Other specified hypothyroidism     5. CKD (chronic kidney disease) stage 3, GFR 30-59 ml/min     6. BMI 31.0-31.9,adult         Immunizations:    Flu:2016  Pneumovax 23:not given  Prevnar 13:not given    Plan:  1. Continue BIPAP 23/19cm H20 nightly.  2. Adjust humidifier for comfort.  3. Discussed sleep hygiene.  4. Continue temazepam and Melatonin  prn for tolerance/insomnia.  5. Follow up in 3 months with compliance card, sooner if needed.

## 2017-04-25 DIAGNOSIS — I10 ESSENTIAL HYPERTENSION: ICD-10-CM

## 2017-04-25 RX ORDER — ENALAPRIL MALEATE AND HYDROCHLOROTHIAZIDE 10; 25 MG/1; MG/1
0.5 TABLET ORAL DAILY
Qty: 90 TAB | Refills: 0 | OUTPATIENT
Start: 2017-04-25

## 2017-05-03 DIAGNOSIS — F41.1 GAD (GENERALIZED ANXIETY DISORDER): ICD-10-CM

## 2017-05-03 NOTE — TELEPHONE ENCOUNTER
Was the patient seen in the last year in this department? Yes 04/21/2017    Does patient have an active prescription for medications requested? No     Received Request Via: Pharmacy

## 2017-05-04 RX ORDER — LORAZEPAM 1 MG/1
1 TABLET ORAL NIGHTLY PRN
Qty: 30 TAB | Refills: 0 | Status: SHIPPED
Start: 2017-05-04 | End: 2017-06-05 | Stop reason: SDUPTHER

## 2017-05-23 ENCOUNTER — HOSPITAL ENCOUNTER (OUTPATIENT)
Dept: LAB | Facility: MEDICAL CENTER | Age: 57
End: 2017-05-23
Attending: OBSTETRICS & GYNECOLOGY
Payer: COMMERCIAL

## 2017-05-23 ENCOUNTER — HOSPITAL ENCOUNTER (OUTPATIENT)
Dept: LAB | Facility: MEDICAL CENTER | Age: 57
End: 2017-05-23
Attending: NURSE PRACTITIONER
Payer: COMMERCIAL

## 2017-05-23 DIAGNOSIS — E03.8 OTHER SPECIFIED HYPOTHYROIDISM: ICD-10-CM

## 2017-05-23 DIAGNOSIS — N18.30 CKD (CHRONIC KIDNEY DISEASE) STAGE 3, GFR 30-59 ML/MIN (HCC): ICD-10-CM

## 2017-05-23 DIAGNOSIS — R73.01 IMPAIRED FASTING BLOOD SUGAR: ICD-10-CM

## 2017-05-23 LAB
25(OH)D3 SERPL-MCNC: 31 NG/ML (ref 30–100)
ALBUMIN SERPL BCP-MCNC: 4 G/DL (ref 3.2–4.9)
ALBUMIN/GLOB SERPL: 1.2 G/DL
ALP SERPL-CCNC: 86 U/L (ref 30–99)
ALT SERPL-CCNC: 57 U/L (ref 2–50)
ANION GAP SERPL CALC-SCNC: 9 MMOL/L (ref 0–11.9)
AST SERPL-CCNC: 45 U/L (ref 12–45)
BILIRUB SERPL-MCNC: 0.8 MG/DL (ref 0.1–1.5)
BUN SERPL-MCNC: 16 MG/DL (ref 8–22)
CALCIUM SERPL-MCNC: 9.7 MG/DL (ref 8.5–10.5)
CHLORIDE SERPL-SCNC: 107 MMOL/L (ref 96–112)
CHOLEST SERPL-MCNC: 143 MG/DL (ref 100–199)
CO2 SERPL-SCNC: 26 MMOL/L (ref 20–33)
CREAT SERPL-MCNC: 1.22 MG/DL (ref 0.5–1.4)
CRP SERPL HS-MCNC: 0.09 MG/DL (ref 0–0.75)
CRP SERPL HS-MCNC: 0.9 MG/L (ref 0–7.5)
EST. AVERAGE GLUCOSE BLD GHB EST-MCNC: 123 MG/DL
EST. AVERAGE GLUCOSE BLD GHB EST-MCNC: 123 MG/DL
GFR SERPL CREATININE-BSD FRML MDRD: 46 ML/MIN/1.73 M 2
GLOBULIN SER CALC-MCNC: 3.3 G/DL (ref 1.9–3.5)
GLUCOSE SERPL-MCNC: 93 MG/DL (ref 65–99)
HBA1C MFR BLD: 5.9 % (ref 0–5.6)
HBA1C MFR BLD: 5.9 % (ref 0–5.6)
HDLC SERPL-MCNC: 44 MG/DL
LDLC SERPL CALC-MCNC: 80 MG/DL
POTASSIUM SERPL-SCNC: 3.9 MMOL/L (ref 3.6–5.5)
PROT SERPL-MCNC: 7.3 G/DL (ref 6–8.2)
SODIUM SERPL-SCNC: 142 MMOL/L (ref 135–145)
TRIGL SERPL-MCNC: 95 MG/DL (ref 0–149)
TSH SERPL DL<=0.005 MIU/L-ACNC: 1.24 UIU/ML (ref 0.3–3.7)

## 2017-05-23 PROCEDURE — 83525 ASSAY OF INSULIN: CPT

## 2017-05-23 PROCEDURE — 83036 HEMOGLOBIN GLYCOSYLATED A1C: CPT

## 2017-05-23 PROCEDURE — 86140 C-REACTIVE PROTEIN: CPT

## 2017-05-23 PROCEDURE — 82306 VITAMIN D 25 HYDROXY: CPT

## 2017-05-23 PROCEDURE — 84443 ASSAY THYROID STIM HORMONE: CPT

## 2017-05-23 PROCEDURE — 80053 COMPREHEN METABOLIC PANEL: CPT

## 2017-05-23 PROCEDURE — 80061 LIPID PANEL: CPT

## 2017-05-23 PROCEDURE — 83036 HEMOGLOBIN GLYCOSYLATED A1C: CPT | Mod: 91

## 2017-05-23 PROCEDURE — 36415 COLL VENOUS BLD VENIPUNCTURE: CPT

## 2017-05-23 PROCEDURE — 86141 C-REACTIVE PROTEIN HS: CPT

## 2017-05-25 LAB — INSULIN P FAST SERPL-ACNC: 12 UIU/ML (ref 3–19)

## 2017-06-05 DIAGNOSIS — F41.1 GAD (GENERALIZED ANXIETY DISORDER): ICD-10-CM

## 2017-06-05 DIAGNOSIS — I10 ESSENTIAL HYPERTENSION: ICD-10-CM

## 2017-06-05 DIAGNOSIS — E78.5 DYSLIPIDEMIA: ICD-10-CM

## 2017-06-05 RX ORDER — ENALAPRIL MALEATE AND HYDROCHLOROTHIAZIDE 10; 25 MG/1; MG/1
0.5 TABLET ORAL DAILY
Qty: 90 TAB | Refills: 0 | Status: SHIPPED | OUTPATIENT
Start: 2017-06-05 | End: 2017-10-10 | Stop reason: SDUPTHER

## 2017-06-05 RX ORDER — SIMVASTATIN 20 MG
20 TABLET ORAL EVERY EVENING
Qty: 90 TAB | Refills: 1 | Status: SHIPPED | OUTPATIENT
Start: 2017-06-05 | End: 2017-09-13 | Stop reason: SDUPTHER

## 2017-06-05 RX ORDER — LORAZEPAM 1 MG/1
1 TABLET ORAL NIGHTLY PRN
Qty: 30 TAB | Refills: 0 | Status: SHIPPED
Start: 2017-06-05 | End: 2017-07-13 | Stop reason: SDUPTHER

## 2017-06-05 RX ORDER — LEVOTHYROXINE SODIUM 0.1 MG/1
100 TABLET ORAL
Qty: 90 TAB | Refills: 1 | Status: SHIPPED | OUTPATIENT
Start: 2017-06-05 | End: 2018-01-04 | Stop reason: SDUPTHER

## 2017-06-05 NOTE — TELEPHONE ENCOUNTER
Last Date Filled:05/04/17    Was the patient seen in the last year in this department? Yes       Does patient have an active prescription for medications requested? No     Received Request Via: Patient     history reviewed prior to writing prescription for controlled substance II, III, or IV per [place] bill .

## 2017-06-05 NOTE — TELEPHONE ENCOUNTER
Was the patient seen in the last year in this department? Yes 04/03/2017    Does patient have an active prescription for medications requested? No     Received Request Via: Patient

## 2017-06-06 NOTE — TELEPHONE ENCOUNTER
Was the patient seen in the last year in this department? Yes     Does patient have an active prescription for medications requested? No     Received Request Via: Pharmacy      Pt met protocol?: Yes    LAST OV 04/03/2017    LAST TSH 05/23/2017    BP Readings from Last 1 Encounters:   04/21/17 118/89     Lab Results   Component Value Date/Time    GLYCOHEMOGLOBIN 5.9* 05/23/2017 10:27 AM        Lab Results   Component Value Date/Time    HDL 44 05/23/2017 10:25 AM

## 2017-06-12 NOTE — TELEPHONE ENCOUNTER
Was the patient seen in the last year in this department? Yes 04/21/17    Does patient have an active prescription for medications requested? No     Received Request Via: Pharmacy

## 2017-06-14 RX ORDER — SOLIFENACIN SUCCINATE 5 MG/1
10 TABLET, FILM COATED ORAL DAILY
Qty: 60 TAB | Refills: 1 | Status: SHIPPED | OUTPATIENT
Start: 2017-06-14 | End: 2017-09-13 | Stop reason: SDUPTHER

## 2017-06-16 ENCOUNTER — APPOINTMENT (OUTPATIENT)
Dept: HEALTH INFORMATION MANAGEMENT | Facility: MEDICAL CENTER | Age: 57
End: 2017-06-16
Payer: COMMERCIAL

## 2017-06-28 NOTE — TELEPHONE ENCOUNTER
Was the patient seen in the last year in this department? Yes 04/03/17    Does patient have an active prescription for medications requested? No     Received Request Via: Patient

## 2017-06-29 RX ORDER — ESCITALOPRAM OXALATE 20 MG/1
20 TABLET ORAL DAILY
Qty: 90 TAB | Refills: 0 | Status: SHIPPED | OUTPATIENT
Start: 2017-06-29 | End: 2017-10-10 | Stop reason: SDUPTHER

## 2017-06-29 NOTE — TELEPHONE ENCOUNTER
Jane, Patient is requesting a refill for Lexapro 20 mg. I see she has also been refilling Lexapro 10mg. Didn't know if you wanted her on both strengths. Refill if you see fit.

## 2017-07-01 RX ORDER — ESCITALOPRAM OXALATE 20 MG/1
TABLET ORAL
Refills: 0 | OUTPATIENT
Start: 2017-07-01

## 2017-07-07 ENCOUNTER — HOSPITAL ENCOUNTER (OUTPATIENT)
Dept: LAB | Facility: MEDICAL CENTER | Age: 57
End: 2017-07-07
Payer: COMMERCIAL

## 2017-07-07 ENCOUNTER — NON-PROVIDER VISIT (OUTPATIENT)
Dept: HEALTH INFORMATION MANAGEMENT | Facility: MEDICAL CENTER | Age: 57
End: 2017-07-07
Payer: COMMERCIAL

## 2017-07-07 VITALS — BODY MASS INDEX: 33.18 KG/M2 | WEIGHT: 180.3 LBS | HEIGHT: 62 IN

## 2017-07-07 DIAGNOSIS — E66.9 OBESITY (BMI 30-39.9): ICD-10-CM

## 2017-07-07 LAB
BDY FAT % MEASURED: 37.9 %
BP DIAS: 58 MMHG
BP SYS: 133 MMHG
CHOLEST SERPL-MCNC: 142 MG/DL (ref 100–199)
DIABETES HTDIA: NO
EVENT NAME HTEVT: NORMAL
FASTING HTFAS: YES
GLUCOSE SERPL-MCNC: 108 MG/DL (ref 65–99)
HDLC SERPL-MCNC: 47 MG/DL
HYPERTENSION HTHYP: YES
LDLC SERPL CALC-MCNC: 70 MG/DL
SCREENING LOC CITY HTCIT: NORMAL
SCREENING LOC STATE HTSTA: NORMAL
SCREENING LOCATION HTLOC: NORMAL
SMOKING HTSMO: NO
SUBSCRIBER ID HTSID: NORMAL
TRIGL SERPL-MCNC: 123 MG/DL (ref 0–149)

## 2017-07-07 PROCEDURE — S5190 WELLNESS ASSESSMENT BY NONPH: HCPCS

## 2017-07-07 PROCEDURE — 80061 LIPID PANEL: CPT

## 2017-07-07 PROCEDURE — 82947 ASSAY GLUCOSE BLOOD QUANT: CPT

## 2017-07-07 PROCEDURE — 97802 MEDICAL NUTRITION INDIV IN: CPT | Performed by: DIETITIAN, REGISTERED

## 2017-07-07 NOTE — PROGRESS NOTES
"7/7/2017    ZIGGY Carter  56 y.o.   Time in/out: 3655 - 1636    Anthropometrics/Objective  Filed Vitals:    07/07/17 1339   Height: 1.575 m (5' 2\")   Weight: 81.784 kg (180 lb 4.8 oz)       Body mass index is 32.97 kg/(m^2).    Stated Goal Weight: 140#    Subjective:  -Wants to lose weight  -Is \"tired\" of weighing so much  -Drinks two 20 ounce bottles of regular soda daily  -Eats a very high CHO diet, especially at breakfast    Nutrition Diagnosis (PES Statement)    Obese related to excessive energy intake and inadequate energy expenditure as evidenced by BMI > 30.    Biochemical data, medical test and procedures  Lab Results   Component Value Date/Time    GLYCOHEMOGLOBIN 5.9* 05/23/2017 10:27 AM     Lab Results   Component Value Date/Time    CHOLESTEROL, 05/23/2017 10:25 AM    LDL 80 05/23/2017 10:25 AM    HDL 44 05/23/2017 10:25 AM    TRIGLYCERIDES 95 05/23/2017 10:25 AM         Nutrition Intervention  Meal and Snack  Recommend a general/healthful diet    Comprehensive Nutrition education Instruction or training leading to in-depth nutrition related knowledge about:  Combine carb, protein and fat at each meal, Metabolism of carb, protein, fat, Portion control and Handouts provided regarding topics discussed    Monitoring & Evaluation Plan  Food / Nutrient Intake:  Fluid/Beverage intake:  Wean down your intake of soda by drinking carbonated water  Food intake:  Eat protein with all breakfasts    Physical Signs / Symptoms:  Weight change:  Weight loss to goal      Assessment Notes:  Koki has a number of different changes she can make and I do not want to overwhelm her with changes, so we will be seeing each other 1x/month for the next few months to help her improve.  She is going to work on improving her balance at her meals, especially breakfast, by eating protein with all of her breakfasts; for convenience she can drink protein shakes for breakfast if she needs to.  She also wants to eat " salads for lunch with her coworker so we discussed balancing her salads with protein and starch as well as vegetables.  She is going to eat no more than 1 cup of starch when she eats it at meals as well. Her soda intake is too high and I also want her to work on decreasing how much she drinks, with a goal of elimination of soda in a few months.  At next f/u we will discuss snacking and we will also set some exercise goals.  F/u in 1 month.

## 2017-07-07 NOTE — MR AVS SNAPSHOT
Koki Townsend   2017 1:00 PM   Non-Provider Visit   MRN: [id number]    Department:  [facility]   Dept Phone:  [phone]    Description:  Female : 1960   Provider:  Jhony Hidalgo RD           Reason for Visit     Obesity           Allergies as of 2017     Allergen Noted Reactions    Codeine 2012   Itching      Vital Signs     Smoking Status                   Passive Smoke Exposure - Never Smoker           Basic Information     Date Of Birth Sex Race Ethnicity Preferred Language    1960 Female White Non- English      Your appointments     2017  3:40 PM   Follow UP with DES Mario   [facility] (--)    [address]  [address]  [place] [id number] [phone]            Aug 18, 2017  3:00 PM   Follow Up Visit with Jhony Hidalgo RD   [facility])    [address]  [address]  [place] [address] [phone]           It is the patient's responsibility to check with your Insurance for benefit coverage for visit / visits.  24 hours notice is required for all appointment changes or cancellation.  Please arrive 20 min. before your appointment time  Please bring the following with you: 1)Picture Id 2) Insurance card 3) Completed Forms if New Patient  If scheduled for DIABETES VISIT please also brin) Medications 2) Meter 3) Blood glucose logs 4) Any recent labs if you have them  If scheduled for NUTRITION VISIT please also brin) 2-3 days of detailed food intake logs 2) Blood glucose monitor and blood glucose logs (if you have them)              Problem List              ICD-10-CM Priority Class Noted - Resolved    Impaired fasting blood sugar R73.01   2015 - Present    CKD (chronic kidney disease) stage 3, GFR 30-59 ml/min N18.3   2015 - Present    Other specified hypothyroidism E03.8   2015 - Present    Essential hypertension I10   2015 - Present     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 (Chronic) I45.2   Unknown - Present    Obstructive sleep apnea G47.33   1/25/2017 - Present    Insomnia G47.00   2/28/2017 - Present    BMI 31.0-31.9,adult Z68.31   4/21/2017 - Present      Health Maintenance        Date Due Completion Dates    COLON CANCER SCREENING ANNUAL FIT 1960 ---    MAMMOGRAM 7/22/2017 7/22/2016, 12/31/2015, 12/14/2015, 4/11/2014, 5/13/2010, 5/13/2010, 4/23/2009, 4/23/2009    IMM INFLUENZA (1) 9/1/2017 9/23/2016, 10/26/2015, 11/27/2014, 11/15/2013    PAP SMEAR 11/23/2018 11/23/2015 (Done)    Override on 11/23/2015: Done (Dr. Williamson)    IMM DTaP/Tdap/Td Vaccine (2 - Td) 12/7/2025 12/7/2015            Current Immunizations     Influenza TIV (IM) 9/23/2016, 11/27/2014, 11/15/2013    Influenza Vaccine Quad Inj (Preserved) 10/26/2015    Tdap Vaccine 12/7/2015      Below and/or attached are the medications your provider expects you to take. Review all of your home medications and newly ordered medications with your provider and/or pharmacist. Follow medication instructions as directed by your provider and/or pharmacist. Please keep your medication list with you and share with your provider. Update the information when medications are discontinued, doses are changed, or new medications (including over-the-counter products) are added; and carry medication information at all times in the event of emergency situations     Allergies:  CODEINE - Itching               Medications  Valid as of: July 07, 2017 -  1:40 PM    Generic Name Brand Name Tablet Size Instructions for use    Benzonatate (Cap) TESSALON 100 MG Take 1 Cap by mouth 3 times a day as needed for Cough.        Cholecalciferol (Cap) Vitamin D3 2000 UNIT Take  by mouth every day.        Enalapril-Hydrochlorothiazide (Tab) VASERETIC 10-25 MG Take 0.5 Tabs by mouth every day.        Escitalopram Oxalate (Tab) LEXAPRO 20 MG Take 1 Tab by mouth every day.         Levothyroxine Sodium (Tab) SYNTHROID 100 MCG Take 1 Tab by mouth every day.        LORazepam (Tab) ATIVAN 1 MG Take 1 Tab by mouth at bedtime as needed for Anxiety.        Simvastatin (Tab) ZOCOR 20 MG Take 1 Tab by mouth every evening.        Solifenacin Succinate (Tab) VESICARE 5 MG Take 2 Tabs by mouth every day.        Zolpidem Tartrate (Tab) AMBIEN 5 MG         .                 Medicines prescribed today were sent to:     [id number] - [place], [place] - [address]    [address] [address]    Phone: [phone] Fax: [phone]    Open 24 Hours?: No    [facility]  [place], [place] - [address] [facility] [place] [address]    Phone: [phone] Fax: [phone]    Open 24 Hours?: No      Medication refill instructions:       If your prescription bottle indicates you have medication refills left, it is not necessary to call your provider’s office. Please contact your pharmacy and they will refill your medication.    If your prescription bottle indicates you do not have any refills left, you may request refills at any time through one of the following ways: The online [employer] system (except Urgent Care), by calling your provider’s office, or by asking your pharmacy to contact your provider’s office with a refill request. Medication refills are processed only during regular business hours and may not be available until the next business day. Your provider may request additional information or to have a follow-up visit with you prior to refilling your medication.   *Please Note: Medication refills are assigned a new Rx number when refilled electronically. Your pharmacy may indicate that no refills were authorized even though a new prescription for the same medication is available at the pharmacy. Please request the medicine by name with the pharmacy before contacting your provider for a refill.           [employer]  Access Code: Activation code not generated  Current MyChart Status: Active

## 2017-07-13 DIAGNOSIS — F41.1 GAD (GENERALIZED ANXIETY DISORDER): ICD-10-CM

## 2017-07-13 RX ORDER — LORAZEPAM 1 MG/1
1 TABLET ORAL NIGHTLY PRN
Qty: 30 TAB | Refills: 0 | Status: SHIPPED
Start: 2017-07-13 | End: 2017-08-14 | Stop reason: SDUPTHER

## 2017-07-13 NOTE — TELEPHONE ENCOUNTER
Last Date Filled:06/05/17    Was the patient seen in the last year in this department? Yes       Does patient have an active prescription for medications requested? No     Received Request Via: Patient     history reviewed prior to writing prescription for controlled substance II, III, or IV per [place] bill .

## 2017-07-13 NOTE — TELEPHONE ENCOUNTER
From: Koki Townsend  To: ZIGGY Carter  Sent: 7/13/2017 12:11 PM PDT  Subject: Medication Renewal Request    Original authorizing provider: ZIGGY Carter would like a refill of the following medications:  lorazepam (ATIVAN) 1 MG Tab [ZIGGY Carter]    Preferred pharmacy: [id number], [place] - [address]    Comment:

## 2017-07-31 ENCOUNTER — SLEEP CENTER VISIT (OUTPATIENT)
Dept: SLEEP MEDICINE | Facility: MEDICAL CENTER | Age: 57
End: 2017-07-31
Payer: COMMERCIAL

## 2017-07-31 VITALS
WEIGHT: 175 LBS | RESPIRATION RATE: 16 BRPM | HEART RATE: 72 BPM | SYSTOLIC BLOOD PRESSURE: 118 MMHG | DIASTOLIC BLOOD PRESSURE: 68 MMHG | BODY MASS INDEX: 32.2 KG/M2 | OXYGEN SATURATION: 92 % | HEIGHT: 62 IN

## 2017-07-31 DIAGNOSIS — E03.9 HYPOTHYROIDISM, UNSPECIFIED TYPE: ICD-10-CM

## 2017-07-31 DIAGNOSIS — G47.33 OBSTRUCTIVE SLEEP APNEA: ICD-10-CM

## 2017-07-31 DIAGNOSIS — I10 ESSENTIAL HYPERTENSION: ICD-10-CM

## 2017-07-31 DIAGNOSIS — N18.30 CKD (CHRONIC KIDNEY DISEASE) STAGE 3, GFR 30-59 ML/MIN (HCC): ICD-10-CM

## 2017-07-31 DIAGNOSIS — G47.00 INSOMNIA, UNSPECIFIED TYPE: ICD-10-CM

## 2017-07-31 PROCEDURE — 99213 OFFICE O/P EST LOW 20 MIN: CPT | Performed by: NURSE PRACTITIONER

## 2017-07-31 NOTE — PROGRESS NOTES
Chief Complaint   Patient presents with   • Follow-Up       HPI:  Koki Townsend is a 56 y.o. year old female here today for follow-up on EVI. Her past medical history is remarkable for hypothyroidism, stage III chronic kidney disease and abnormal liver associated enzymes. She does use lorazepam 1 mg up to 3 times a day as needed for anxiety. She takes enalapril for systemic arterial hypertension. She does have a brother with obstructive sleep apnea, successfully treated with CPAP.    PSG split night 1/11/2017 indicated severe obstructive sleep apnea and AHI 94.6 and a minimum oxygen saturation of 77%. PLMS index of 20.4. CPAP titration was attempted but not completed. Titration study 2/26/17 indicates successful titration to BIPAP 23/19cm H20 with normal oximetry. She tolerated mask well. She achieved REM sleep while supine on this setting. No PLMs. Compliance card 7/1/2017 through 732 and 17 indicates 93.3% compliance, average nightly usage of 6 hours and 55 minutes, AHI 5.4 and minimal mask leak to 5 minutes. She tolerates mask/pressure but notes feeling claustrophobic and requires temazepam to help with sleep at times. PCP has filled this. She also started using Melatonin OTC and notes falling asleep quicker since starting this and BIPAP therapy. She has also noted some condensation in mask and continues to adjust humidifier down. She overall feels better on therapy - more rested, energy consistent during the day and no headaches.     She denies any changes in health since last OV. Her PCP continues to manage her cough - it has improved with antihistamine use. She denies fever, chills, night sweats, chest pain, swelling, dyspnea, wheezing, phlegm or hemoptysis. She denies GERD, sinus issues or dizziness on a regular basis. .     ROS: As per HPI and otherwise negative if not stated.    Past Medical History   Diagnosis Date   • Anxiety    • Thyroid disease    • Hypertension    • Right bundle branch block (RBBB) and  "left anterior fascicular block    • Hypothyroidism    • Nasal drainage    • Sleep apnea    • Chickenpox    • Setswana measles    • Tonsillitis        Past Surgical History   Procedure Laterality Date   • Thyroidectomy       Left lobe removed   • Sinuscope     • Tonsillectomy     • Tubal ligation         Family History   Problem Relation Age of Onset   • Heart Failure Mother    • Diabetes Mother    • Cancer Father      lung-abest   • Lung Cancer Father    • Stroke Neg Hx    • Sleep Apnea Brother        Social History     Social History   • Marital Status: Single     Spouse Name: N/A   • Number of Children: N/A   • Years of Education: N/A     Occupational History   • Not on file.     Social History Main Topics   • Smoking status: Passive Smoke Exposure - Never Smoker   • Smokeless tobacco: Never Used   • Alcohol Use: 0.0 oz/week     0 Standard drinks or equivalent per week      Comment: rarely   • Drug Use: No   • Sexual Activity:     Partners: Male     Birth Control/ Protection: Post-Menopausal     Other Topics Concern   • Not on file     Social History Narrative       Allergies as of 07/31/2017 - Jaswinder as Reviewed 07/31/2017   Allergen Reaction Noted   • Codeine Itching 12/18/2012        @Vital signs for this encounter:  Filed Vitals:    07/31/17 1548   Height: 1.575 m (5' 2.01\")   Weight: 79.379 kg (175 lb)   Weight % change since last entry.: 0 %   BP: 118/68   Pulse: 72   BMI (Calculated): 32   Resp: 16   O2 sat % room air: 92 %       Current medications as of today   Current Outpatient Prescriptions   Medication Sig Dispense Refill   • lorazepam (ATIVAN) 1 MG Tab Take 1 Tab by mouth at bedtime as needed for Anxiety. 30 Tab 0   • escitalopram (LEXAPRO) 20 MG tablet Take 1 Tab by mouth every day. 90 Tab 0   • solifenacin (VESICARE) 5 MG tablet Take 2 Tabs by mouth every day. 60 Tab 1   • levothyroxine (SYNTHROID) 100 MCG Tab Take 1 Tab by mouth every day. 90 Tab 1   • simvastatin (ZOCOR) 20 MG Tab Take 1 Tab by mouth " every evening. 90 Tab 1   • enalapril-hydrochlorthiazide (VASERETIC) 10-25 MG per tablet Take 0.5 Tabs by mouth every day. 90 Tab 0   • zolpidem (AMBIEN) 5 MG Tab      • benzonatate (TESSALON) 100 MG Cap Take 1 Cap by mouth 3 times a day as needed for Cough. 60 Cap 0   • Cholecalciferol (VITAMIN D3) 2000 UNIT Cap Take  by mouth every day.       No current facility-administered medications for this visit.         Physical Exam:   Gen:           Alert and oriented, No apparent distress. Mood and affect appropriate, normal interaction with examiner.  Eyes:          PERRL, EOM intact, sclere white, conjunctive moist.  Ears:          Not examined.  Hearing:     Grossly intact.  Nose:          Normal, no lesions or deformities.  Dentition:    Good dentition.  Oropharynx:   Tongue normal, posterior pharynx without erythema or exudate.  Mallampati Classification: 3  Neck:        Supple, trachea midline, no masses.  Respiratory Effort: No intercostal retractions or use of accessory muscles.   Lung Auscultation:      Clear to auscultation bilaterally; no rales, rhonchi or wheezing.  CV:            Regular rate and rhythm. No murmurs, rubs or gallops.  Abd:           Not examined.   Lymphadenopathy: Not examined.  Gait and Station: Normal.  Digits and Nails: No clubbing, cyanosis, petechiae, or nodes.   Cranial Nerves: II-XII grossly intact.  Skin:        No rashes, lesions or ulcers noted.               Ext:           No cyanosis or edema.      Assessment:  1. Obstructive sleep apnea  DME MASK AND SUPPLIES   2. Insomnia, unspecified type     3. Essential hypertension     4. Hypothyroidism, unspecified type     5. CKD (chronic kidney disease) stage 3, GFR 30-59 ml/min     6. BMI 31.0-31.9,adult         Immunizations:    Flu:2016  Pneumovax 23:not given  Prevnar 13:not given    Plan:  1. Continue BIPAP at current setting nightly.  2. DME mask/supplies.  3. Encouraged weight loss.  4. Discussed sleep hygiene.  5. Follow up in 1  year with compliance card, sooner if needed.

## 2017-07-31 NOTE — MR AVS SNAPSHOT
"Koki Cary   2017 3:40 PM   Sleep Center Visit   MRN: [id number]    Department:  [facility]   Dept Phone:  [phone]    Description:  Female : 1960   Provider:  DES Mario           Reason for Visit     Follow-Up           Allergies as of 2017     Allergen Noted Reactions    Codeine 2012   Itching      You were diagnosed with     Obstructive sleep apnea   [017312]       Insomnia, unspecified type   [6933142]       Essential hypertension   [2419416]       Hypothyroidism, unspecified type   [8975825]       CKD (chronic kidney disease) stage 3, GFR 30-59 ml/min   [175351]       BMI 31.0-31.9,adult   [259155]         Vital Signs     Blood Pressure Pulse Respirations Height Weight Body Mass Index    118/68 mmHg 72 16 1.575 m (5' 2.01\") 79.379 kg (175 lb) 32.00 kg/m2    Oxygen Saturation Smoking Status                92% Passive Smoke Exposure - Never Smoker          Basic Information     Date Of Birth Sex Race Ethnicity Preferred Language    1960 Female White Non- English      Your appointments     Aug 18, 2017  3:00 PM   Follow Up Visit with Jhony Hidalgo RD   [facility])    [address]  [address]  [facility] [id number] [phone]           It is the patient's responsibility to check with your Insurance for benefit coverage for visit / visits.  24 hours notice is required for all appointment changes or cancellation.  Please arrive 20 min. before your appointment time  Please bring the following with you: 1)Picture Id 2) Insurance card 3) Completed Forms if New Patient  If scheduled for DIABETES VISIT please also brin) Medications 2) Meter 3) Blood glucose logs 4) Any recent labs if you have them  If scheduled for NUTRITION VISIT please also brin) 2-3 days of detailed food intake logs 2) Blood glucose monitor and blood glucose logs (if you have them)            2018  5:20 PM   Established " Patient with ZIGGY Maher   [facility] [place]    [address] [address] [phone]           You will be receiving a confirmation call a few days before your appointment from our automated call confirmation system.              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 Williamson)    IMM DTaP/Tdap/Td Vaccine (2 - Td) 12/7/2025 12/7/2015            Current Immunizations     Influenza TIV (IM) 9/23/2016, 11/27/2014, 11/15/2013    Influenza Vaccine Quad Inj (Preserved) 10/26/2015    Tdap Vaccine 12/7/2015      Below and/or attached are the medications your provider expects you to take. Review all of your home medications and newly ordered medications with your provider and/or pharmacist. Follow medication instructions as directed by your provider and/or pharmacist. Please keep your medication list with you and share with your provider. Update the information when medications are discontinued, doses are changed, or new medications (including over-the-counter products) are  added; and carry medication information at all times in the event of emergency situations     Allergies:  CODEINE - Itching               Medications  Valid as of: July 31, 2017 -  4:22 PM    Generic Name Brand Name Tablet Size Instructions for use    Benzonatate (Cap) TESSALON 100 MG Take 1 Cap by mouth 3 times a day as needed for Cough.        Cholecalciferol (Cap) Vitamin D3 2000 UNIT Take  by mouth every day.        Enalapril-Hydrochlorothiazide (Tab) VASERETIC 10-25 MG Take 0.5 Tabs by mouth every day.        Escitalopram Oxalate (Tab) LEXAPRO 20 MG Take 1 Tab by mouth every day.        Levothyroxine Sodium (Tab) SYNTHROID 100 MCG Take 1 Tab by mouth every day.        LORazepam (Tab) ATIVAN 1 MG Take 1 Tab by mouth at bedtime as needed for Anxiety.        Simvastatin (Tab) ZOCOR 20 MG Take 1 Tab by mouth every evening.        Solifenacin Succinate (Tab) VESICARE 5 MG Take 2 Tabs by mouth every day.        Zolpidem Tartrate (Tab) AMBIEN 5 MG         .                 Medicines prescribed today were sent to:     [id number] - [place], [place] - [address] [address]    Phone: [phone] Fax: [phone]    Open 24 Hours?: No    [facility] [id number] [place] [address] AT [address] [address]    Phone: [phone] Fax: [phone]    Open 24 Hours?: No      Medication refill instructions:       If your prescription bottle indicates you have medication refills left, it is not necessary to call your provider’s office. Please contact your pharmacy and they will refill your medication.    If your prescription bottle indicates you do not have any refills left, you may request refills at any time through one of the following ways: The online [employer] system (except Urgent Care), by calling your provider’s office, or by asking your pharmacy to contact your provider’s office with a refill request.  Medication refills are processed only during regular business hours and may not be available until the next business day. Your provider may request additional information or to have a follow-up visit with you prior to refilling your medication.   *Please Note: Medication refills are assigned a new Rx number when refilled electronically. Your pharmacy may indicate that no refills were authorized even though a new prescription for the same medication is available at the pharmacy. Please request the medicine by name with the pharmacy before contacting your provider for a refill.           [employer] Access Code: Activation code not generated  Current [employer] Status: Active

## 2017-08-14 DIAGNOSIS — F41.1 GAD (GENERALIZED ANXIETY DISORDER): ICD-10-CM

## 2017-08-14 RX ORDER — LORAZEPAM 1 MG/1
1 TABLET ORAL NIGHTLY PRN
Qty: 30 TAB | Refills: 0 | Status: SHIPPED
Start: 2017-08-14 | End: 2017-09-13 | Stop reason: SDUPTHER

## 2017-08-14 NOTE — TELEPHONE ENCOUNTER
Last Date Filled:7/13/17    Was the patient seen in the last year in this department? Yes       Does patient have an active prescription for medications requested? No     Received Request Via: Patient     history reviewed prior to writing prescription for controlled substance II, III, or IV per [place] bill .

## 2017-08-15 ENCOUNTER — OFFICE VISIT (OUTPATIENT)
Dept: URGENT CARE | Facility: PHYSICIAN GROUP | Age: 57
End: 2017-08-15
Payer: COMMERCIAL

## 2017-08-15 VITALS
TEMPERATURE: 98.2 F | HEART RATE: 76 BPM | WEIGHT: 172 LBS | OXYGEN SATURATION: 95 % | RESPIRATION RATE: 16 BRPM | SYSTOLIC BLOOD PRESSURE: 138 MMHG | BODY MASS INDEX: 31.65 KG/M2 | HEIGHT: 62 IN | DIASTOLIC BLOOD PRESSURE: 70 MMHG

## 2017-08-15 DIAGNOSIS — J22 ACUTE RESPIRATORY INFECTION: ICD-10-CM

## 2017-08-15 PROCEDURE — 99214 OFFICE O/P EST MOD 30 MIN: CPT | Performed by: FAMILY MEDICINE

## 2017-08-15 RX ORDER — BENZONATATE 200 MG/1
CAPSULE ORAL
Qty: 30 CAP | Refills: 0 | Status: SHIPPED | OUTPATIENT
Start: 2017-08-15 | End: 2018-01-04

## 2017-08-15 RX ORDER — AZITHROMYCIN 250 MG/1
TABLET, FILM COATED ORAL
Qty: 6 TAB | Refills: 0 | Status: SHIPPED | OUTPATIENT
Start: 2017-08-15 | End: 2018-01-04

## 2017-08-15 RX ORDER — METHYLPREDNISOLONE 4 MG/1
TABLET ORAL
Qty: 21 TAB | Refills: 0 | Status: SHIPPED | OUTPATIENT
Start: 2017-08-15 | End: 2018-01-04

## 2017-08-15 NOTE — PROGRESS NOTES
Chief Complaint:    Chief Complaint   Patient presents with   • Cough     x 2 days       History of Present Illness:    This is a new problem. Symptoms started 8/13/17. Has had intermittent chills (she reports she never gets chills), nasal congestion, sore throat, and cough productive of purulent mucus. She reports symptoms feel exactly like Jan 2017 when she was treated with Z-wilmer, Medrol Dose Wilmer, and Tessalon. Felt meds helped very well, but had to take two Tessalon 100 mg at a time. Does not get allergies presenting like this.      Review of Systems:    Constitutional: See HPI.  Eyes: Negative for change in vision, photophobia, pain, redness, and discharge.  ENT: Negative for ear pain, ear discharge, hearing loss, tinnitus, nasal congestion, nosebleeds, and sore throat.    Respiratory: See HPI.    Cardiovascular: See HPI.  Gastrointestinal: Negative for abdominal pain, nausea, vomiting, diarrhea, constipation, blood in stool, and melena.   Genitourinary: Negative for dysuria, urinary urgency, urinary frequency, hematuria, and flank pain.   Musculoskeletal: Negative for myalgias, joint pain, neck pain, and back pain.   Skin: Negative for rash and itching.   Neurological: Negative for dizziness, tingling, tremors, sensory change, speech change, focal weakness, seizures, loss of consciousness, and headaches.   Endo: Negative for polydipsia.   Heme: Does not bruise/bleed easily.   Psychiatric/Behavioral: Anxiety, on medication.    Past Medical History:    Past Medical History   Diagnosis Date   • Anxiety    • Thyroid disease    • Hypertension    • 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    • Hypothyroidism    • Nasal drainage    • Sleep apnea    • Chickenpox    • Bulgarian measles    • Tonsillitis        Past Surgical History:    Past Surgical History   Procedure Laterality Date   • Thyroidectomy       Left lobe removed   • Sinuscope     • Tonsillectomy     • Tubal ligation         Social  "History:    Social History     Social History   • Marital Status: Single     Spouse Name: N/A   • Number of Children: N/A   • Years of Education: N/A     Occupational History   • Not on file.     Social History Main Topics   • Smoking status: Passive Smoke Exposure - Never Smoker   • Smokeless tobacco: Never Used   • Alcohol Use: 0.0 oz/week     0 Standard drinks or equivalent per week      Comment: rarely   • Drug Use: No   • Sexual Activity:     Partners: Male     Birth Control/ Protection: Post-Menopausal     Other Topics Concern   • Not on file     Social History Narrative       Family History:    Family History   Problem Relation Age of Onset   • Heart Failure Mother    • Diabetes Mother    • Cancer Father      lung-abest   • Lung Cancer Father    • Stroke Neg Hx    • Sleep Apnea Brother        Medications:    Current Outpatient Prescriptions on File Prior to Visit   Medication Sig Dispense Refill   • escitalopram (LEXAPRO) 20 MG tablet Take 1 Tab by mouth every day. 90 Tab 0   • solifenacin (VESICARE) 5 MG tablet Take 2 Tabs by mouth every day. 60 Tab 1   • levothyroxine (SYNTHROID) 100 MCG Tab Take 1 Tab by mouth every day. 90 Tab 1   • simvastatin (ZOCOR) 20 MG Tab Take 1 Tab by mouth every evening. 90 Tab 1   • enalapril-hydrochlorthiazide (VASERETIC) 10-25 MG per tablet Take 0.5 Tabs by mouth every day. 90 Tab 0   • benzonatate (TESSALON) 100 MG Cap Take 1 Cap by mouth 3 times a day as needed for Cough. 60 Cap 0   • lorazepam (ATIVAN) 1 MG Tab Take 1 Tab by mouth at bedtime as needed for Anxiety. 30 Tab 0   • Cholecalciferol (VITAMIN D3) 2000 UNIT Cap Take  by mouth every day.       No current facility-administered medications on file prior to visit.       Allergies:    Allergies   Allergen Reactions   • Codeine Itching         Vitals:    Filed Vitals:    08/15/17 1453   BP: 138/70   Pulse: 76   Temp: 36.8 °C (98.2 °F)   Resp: 16   Height: 1.575 m (5' 2.01\")   Weight: 78.019 kg (172 lb)   SpO2: 95% "       Physical Exam:    Constitutional: Vital signs reviewed. Appears well-developed and well-nourished. Occl congested coughing. No acute distress.   Eyes: Sclera white, conjunctivae clear.   ENT: Bilateral nasal congestion with mucus in nares. External ears normal. External auditory canals normal without discharge. TMs translucent and non-bulging. Hearing normal. Lips/teeth are normal. Oral mucosa pink and moist. Posterior pharynx: WNL.  Neck: Neck supple.   Cardiovascular: Regular rate and rhythm. No murmur.  Pulmonary/Chest: Respirations non-labored. Clear to auscultation bilaterally.  Lymph: Cervical nodes without tenderness or enlargement.  Musculoskeletal: Normal gait. Normal range of motion. No muscular atrophy or weakness.  Neurological: Alert and oriented to person, place, and time. Muscle tone normal. Coordination normal.   Skin: No rashes or lesions. Warm, dry, normal turgor.  Psychiatric: Normal mood and affect. Behavior is normal. Judgment and thought content normal.       Assessment / Plan:    1. Acute respiratory infection  - azithromycin (ZITHROMAX) 250 MG Tab; 2 TABS ON DAY 1, 1 TAB ON DAYS 2-5.  Dispense: 6 Tab; Refill: 0  - MethylPREDNISolone (MEDROL DOSEPAK) 4 MG Tablet Therapy Pack; TAKE AS DIRECTED ON PACKAGE.  Dispense: 21 Tab; Refill: 0  - benzonatate (TESSALON) 200 MG capsule; 1 CAP UP TO 3 TIMES A DAY ONLY IF NEEDED FOR COUGH.  Dispense: 30 Cap; Refill: 0      Work note given - excuse for 8/15 thru and including 8/17/17.    Discussed with her DDX and management options.    Agreeable to medications prescribed.    Follow-up with PCP or urgent care if getting worse or not better with above.

## 2017-08-15 NOTE — Clinical Note
August 15, 2017         Patient: Koki Townsend   YOB: 1960   Date of Visit: 8/15/2017           To Whom it May Concern:    Koki Townsend was seen in my clinic on 8/15/2017.     Please excuse from work for 8/15 thru and including 8/17/17 due to medical condition.    If you have any questions or concerns, please don't hesitate to call.        Sincerely,           Malvin Krause M.D.  Electronically Signed

## 2017-08-15 NOTE — MR AVS SNAPSHOT
"Koki Townsend   8/15/2017 2:45 PM   Office Visit   MRN: [id number]    Department:  [facility]   Dept Phone:  [phone]    Description:  Female : 1960   Provider:  Malvin Krause M.D.           Reason for Visit     Cough x 2 days      Allergies as of 8/15/2017     Allergen Noted Reactions    Codeine 2012   Itching      You were diagnosed with     Acute respiratory infection   [[id number]]         Vital Signs     Blood Pressure Pulse Temperature Respirations Height Weight    138/70 mmHg 76 36.8 °C (98.2 °F) 16 1.575 m (5' 2.01\") 78.019 kg (172 lb)    Body Mass Index Oxygen Saturation Smoking Status             31.45 kg/m2 95% Passive Smoke Exposure - Never Smoker         Basic Information     Date Of Birth Sex Race Ethnicity Preferred Language    1960 Female White Non- English      Your appointments     Aug 18, 2017  3:00 PM   Follow Up Visit with Jhony Hidalgo RD   [facility])    [address]  [address]  [place] [id number] [phone]           It is the patient's responsibility to check with your Insurance for benefit coverage for visit / visits.  24 hours notice is required for all appointment changes or cancellation.  Please arrive 20 min. before your appointment time  Please bring the following with you: 1)Picture Id 2) Insurance card 3) Completed Forms if New Patient  If scheduled for DIABETES VISIT please also brin) Medications 2) Meter 3) Blood glucose logs 4) Any recent labs if you have them  If scheduled for NUTRITION VISIT please also brin) 2-3 days of detailed food intake logs 2) Blood glucose monitor and blood glucose logs (if you have them)            Sep 06, 2017  8:30 AM   MA SCRN10 with RBHC MG 1   [facility] ([address])    [address]  [place] [address] [phone]           No deodorant, powder, perfume or lotion under the arm or breast area.            Sep " 06, 2017  3:40 PM   MA SCRN10 with RBHC MG 3   [facility] ([address])    [address]  [place] [address] [phone]           No deodorant, powder, perfume or lotion under the arm or breast area.            Jan 04, 2018  5:20 PM   Established Patient with ZIGGY Maher   [facility]    [address] [facility] [place] [address] [phone]           You will be receiving a confirmation call a few days before your appointment from our automated call confirmation system.              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 Williamson)    IMM DTaP/Tdap/Td Vaccine (2 - Td) 12/7/2025 12/7/2015            Current Immunizations     Influenza TIV (IM) 9/23/2016, 11/27/2014, 11/15/2013    Influenza Vaccine Quad Inj (Preserved) 10/26/2015    Tdap Vaccine 12/7/2015      Below and/or attached are the medications your provider expects you to take. Review all of your home medications and newly ordered medications with your provider and/or pharmacist.  Follow medication instructions as directed by your provider and/or pharmacist. Please keep your medication list with you and share with your provider. Update the information when medications are discontinued, doses are changed, or new medications (including over-the-counter products) are added; and carry medication information at all times in the event of emergency situations     Allergies:  CODEINE - Itching               Medications  Valid as of: August 15, 2017 -  3:10 PM    Generic Name Brand Name Tablet Size Instructions for use    Azithromycin (Tab) ZITHROMAX 250 MG 2 TABS ON DAY 1, 1 TAB ON DAYS 2-5.        Benzonatate (Cap) TESSALON 100 MG Take 1 Cap by mouth 3 times a day as needed for Cough.        Benzonatate (Cap) TESSALON 200 MG 1 CAP UP TO 3 TIMES A DAY ONLY IF NEEDED FOR COUGH.        Cholecalciferol (Cap) Vitamin D3 2000 UNIT Take  by mouth every day.        Enalapril-Hydrochlorothiazide (Tab) VASERETIC 10-25 MG Take 0.5 Tabs by mouth every day.        Escitalopram Oxalate (Tab) LEXAPRO 20 MG Take 1 Tab by mouth every day.        Levothyroxine Sodium (Tab) SYNTHROID 100 MCG Take 1 Tab by mouth every day.        LORazepam (Tab) ATIVAN 1 MG Take 1 Tab by mouth at bedtime as needed for Anxiety.        MethylPREDNISolone (Tablet Therapy Pack) MEDROL DOSEPAK 4 MG TAKE AS DIRECTED ON PACKAGE.        Simvastatin (Tab) ZOCOR 20 MG Take 1 Tab by mouth every evening.        Solifenacin Succinate (Tab) VESICARE 5 MG Take 2 Tabs by mouth every day.        Zolpidem Tartrate (Tab) AMBIEN 5 MG         .                 Medicines prescribed today were sent to:     [id number] - [place], [place] - [address] [address]    Phone: [phone] Fax: [phone]    Open 24 Hours?: No    [id number] - [place] - [address] [place], [place] [address] AT [address] [address]    Phone: [phone] Fax: [phone]    Open 24  Hours?: No      Medication refill instructions:       If your prescription bottle indicates you have medication refills left, it is not necessary to call your provider’s office. Please contact your pharmacy and they will refill your medication.    If your prescription bottle indicates you do not have any refills left, you may request refills at any time through one of the following ways: The online COMS Interactive system (except Urgent Care), by calling your provider’s office, or by asking your pharmacy to contact your provider’s office with a refill request. Medication refills are processed only during regular business hours and may not be available until the next business day. Your provider may request additional information or to have a follow-up visit with you prior to refilling your medication.   *Please Note: Medication refills are assigned a new Rx number when refilled electronically. Your pharmacy may indicate that no refills were authorized even though a new prescription for the same medication is available at the pharmacy. Please request the medicine by name with the pharmacy before contacting your provider for a refill.           COMS Interactive Access Code: Activation code not generated  Current COMS Interactive Status: Active

## 2017-08-18 ENCOUNTER — NON-PROVIDER VISIT (OUTPATIENT)
Dept: HEALTH INFORMATION MANAGEMENT | Facility: MEDICAL CENTER | Age: 57
End: 2017-08-18
Payer: COMMERCIAL

## 2017-08-18 VITALS — BODY MASS INDEX: 32.26 KG/M2 | WEIGHT: 175.3 LBS | HEIGHT: 62 IN

## 2017-08-18 DIAGNOSIS — E66.9 OBESITY (BMI 30-39.9): ICD-10-CM

## 2017-08-18 PROCEDURE — 97803 MED NUTRITION INDIV SUBSEQ: CPT | Performed by: DIETITIAN, REGISTERED

## 2017-08-18 NOTE — MR AVS SNAPSHOT
Koki Townsend   2017 3:00 PM   Appointment   MRN: [id number]    Department:  [facility]   Dept Phone:  [phone]    Description:  Female : 1960   Provider:  Jhony Hidalgo RD           Allergies as of 2017     Allergen Noted Reactions    Codeine 2012   Itching      Vital Signs     Smoking Status                   Passive Smoke Exposure - Never Smoker           Basic Information     Date Of Birth Sex Race Ethnicity Preferred Language    1960 Female White Non- English      Your appointments     Sep 06, 2017  8:30 AM   MA SCRN10 with RBHC MG 1   [facility] [facility])    [address]  [place] [address]   [phone]           No deodorant, powder, perfume or lotion under the arm or breast area.            Sep 06, 2017  3:40 PM   MA SCRN10 with RBHC MG 3   [facility] ([address])    [address]  [place] [address]   [phone]           No deodorant, powder, perfume or lotion under the arm or breast area.            Sep 22, 2017 10:00 AM   Follow Up Visit with Jhony Hidalgo RD   [facility])    [address]  [address]  [place] [address] [phone]           It is the patient's responsibility to check with your Insurance for benefit coverage for visit / visits.  24 hours notice is required for all appointment changes or cancellation.  Please arrive 20 min. before your appointment time  Please bring the following with you: 1)Picture Id 2) Insurance card 3) Completed Forms if New Patient  If scheduled for DIABETES VISIT please also brin) Medications 2) Meter 3) Blood glucose logs 4) Any recent labs if you have them  If scheduled for NUTRITION VISIT please also brin) 2-3 days of detailed food intake logs 2) Blood glucose monitor and blood glucose logs (if you have them)            2018  5:20 PM   Established Patient with  SCOOTER Maher.   [facility]    [address] [facility] [address] [phone]           You will be receiving a confirmation call a few days before your appointment from our automated call confirmation system.              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 Williamson)    IMM DTaP/Tdap/Td Vaccine (2 - Td) 12/7/2025 12/7/2015            Current Immunizations     Influenza TIV (IM) 9/23/2016, 11/27/2014, 11/15/2013    Influenza Vaccine Quad Inj (Preserved) 10/26/2015    Tdap Vaccine 12/7/2015      Below and/or attached are the medications your provider expects you to take. Review all of your home medications and newly ordered medications with your provider and/or pharmacist. Follow medication instructions as directed by your provider and/or pharmacist. Please keep your medication list with you and share with your provider. Update the information when medications are discontinued, doses are changed, or new medications (including over-the-counter products) are added; and  carry medication information at all times in the event of emergency situations     Allergies:  CODEINE - Itching               Medications  Valid as of: August 18, 2017 -  3:20 PM    Generic Name Brand Name Tablet Size Instructions for use    Azithromycin (Tab) ZITHROMAX 250 MG 2 TABS ON DAY 1, 1 TAB ON DAYS 2-5.        Benzonatate (Cap) TESSALON 100 MG Take 1 Cap by mouth 3 times a day as needed for Cough.        Benzonatate (Cap) TESSALON 200 MG 1 CAP UP TO 3 TIMES A DAY ONLY IF NEEDED FOR COUGH.        Cholecalciferol (Cap) Vitamin D3 2000 UNIT Take  by mouth every day.        Enalapril-Hydrochlorothiazide (Tab) VASERETIC 10-25 MG Take 0.5 Tabs by mouth every day.        Escitalopram Oxalate (Tab) LEXAPRO 20 MG Take 1 Tab by mouth every day.        Levothyroxine Sodium (Tab) SYNTHROID 100 MCG Take 1 Tab by mouth every day.        LORazepam (Tab) ATIVAN 1 MG Take 1 Tab by mouth at bedtime as needed for Anxiety.        MethylPREDNISolone (Tablet Therapy Pack) MEDROL DOSEPAK 4 MG TAKE AS DIRECTED ON PACKAGE.        Simvastatin (Tab) ZOCOR 20 MG Take 1 Tab by mouth every evening.        Solifenacin Succinate (Tab) VESICARE 5 MG Take 2 Tabs by mouth every day.        .                 Medicines prescribed today were sent to:     [id number] - [place], [place] - [address] [address]    Phone: [phone] Fax: [phone]    Open 24 Hours?: No    [facility] [address] [place] [address] AT [address] [address]    Phone: [phone] Fax: [phone]    Open 24 Hours?: No      Medication refill instructions:       If your prescription bottle indicates you have medication refills left, it is not necessary to call your provider’s office. Please contact your pharmacy and they will refill your medication.    If your prescription bottle indicates you do not have any refills left, you may request refills at  any time through one of the following ways: The online Mitro system (except Urgent Care), by calling your provider’s office, or by asking your pharmacy to contact your provider’s office with a refill request. Medication refills are processed only during regular business hours and may not be available until the next business day. Your provider may request additional information or to have a follow-up visit with you prior to refilling your medication.   *Please Note: Medication refills are assigned a new Rx number when refilled electronically. Your pharmacy may indicate that no refills were authorized even though a new prescription for the same medication is available at the pharmacy. Please request the medicine by name with the pharmacy before contacting your provider for a refill.           Mitro Access Code: Activation code not generated  Current Mitro Status: Active

## 2017-08-18 NOTE — PROGRESS NOTES
"Nutrition Reassess    8/18/2017    ZIGGY Carter   56 y.o.   Time in/out:  1458 - 1517    Subjective:  -Stopped drinking soda  -Is drinking a protein shake for breakfast and states she has noticed a big improvement in her hunger at lunchtime  -Eating big salads with protein/CHO daily for lunch with her coworker  -Wants to work on improving exercise habit    Anthropometrics/Objective  Filed Vitals:    08/18/17 1523   Height: 1.575 m (5' 2\")   Weight: 79.516 kg (175 lb 4.8 oz)     Body mass index is 32.05 kg/(m^2).    Previous weight/date: 180.3#  Change:  - 5#     ReAssesment/Notes:  Koki has made some great changes and they will continue to benefit her as she continues to do them.  She would like to lose weight quicker so I explained that slower weight loss means that she has not done anything extreme that she cannot maintain as a lifestyle to lose weight.  She has only improved her balance and portions at meals and that can continue in the future with more weight loss and then weight maintenance in the future.  We moved onto snacking today and she understands now that she can eat protein only or CHO with protein for a snack; she admits she has likely been eating too much fruit (like grapes) when she snacks and she wants to do a better job of this in the future.  She also is going to start walking on at least two days each week for a minimum amount of time of 20 minutes each walk.  We discussed the long-term goal of at least 3 days/week and 150 minutes/week of exercise and she states she will keep that long-term goal in mind and increase activity as tolerated.  At next f/u we can discuss the food label and sodium, specifically.    Follow-up: 1 month      "

## 2017-09-06 ENCOUNTER — HOSPITAL ENCOUNTER (OUTPATIENT)
Dept: RADIOLOGY | Facility: MEDICAL CENTER | Age: 57
End: 2017-09-06
Attending: NURSE PRACTITIONER
Payer: COMMERCIAL

## 2017-09-06 DIAGNOSIS — Z12.31 ENCOUNTER FOR SCREENING MAMMOGRAM FOR BREAST CANCER: ICD-10-CM

## 2017-09-06 PROCEDURE — G0202 SCR MAMMO BI INCL CAD: HCPCS

## 2017-09-13 DIAGNOSIS — F41.1 GAD (GENERALIZED ANXIETY DISORDER): ICD-10-CM

## 2017-09-13 DIAGNOSIS — E78.5 DYSLIPIDEMIA: ICD-10-CM

## 2017-09-13 RX ORDER — SOLIFENACIN SUCCINATE 5 MG/1
10 TABLET, FILM COATED ORAL DAILY
Qty: 180 TAB | Refills: 3 | Status: SHIPPED | OUTPATIENT
Start: 2017-09-13 | End: 2018-09-24 | Stop reason: SDUPTHER

## 2017-09-13 RX ORDER — SIMVASTATIN 20 MG
20 TABLET ORAL EVERY EVENING
Qty: 90 TAB | Refills: 3 | Status: SHIPPED | OUTPATIENT
Start: 2017-09-13 | End: 2018-11-27

## 2017-09-13 RX ORDER — LORAZEPAM 1 MG/1
1 TABLET ORAL NIGHTLY PRN
Qty: 30 TAB | Refills: 0 | Status: SHIPPED | OUTPATIENT
Start: 2017-09-13 | End: 2017-10-10 | Stop reason: SDUPTHER

## 2017-09-13 NOTE — TELEPHONE ENCOUNTER
Was the patient seen in the last year in this department? Yes 04/30/2017    Does patient have an active prescription for medications requested? No     Received Request Via: Patient

## 2017-09-15 ENCOUNTER — HOSPITAL ENCOUNTER (OUTPATIENT)
Dept: RADIOLOGY | Facility: MEDICAL CENTER | Age: 57
End: 2017-09-15
Attending: OBSTETRICS & GYNECOLOGY
Payer: COMMERCIAL

## 2017-09-15 DIAGNOSIS — R10.32 ABDOMINAL PAIN, LEFT LOWER QUADRANT: ICD-10-CM

## 2017-09-15 DIAGNOSIS — R10.31 ABDOMINAL PAIN, RIGHT LOWER QUADRANT: ICD-10-CM

## 2017-09-15 PROCEDURE — 76830 TRANSVAGINAL US NON-OB: CPT

## 2017-09-29 ENCOUNTER — HOSPITAL ENCOUNTER (OUTPATIENT)
Dept: RADIOLOGY | Facility: MEDICAL CENTER | Age: 57
End: 2017-09-29
Attending: OBSTETRICS & GYNECOLOGY
Payer: COMMERCIAL

## 2017-09-29 DIAGNOSIS — R10.32 ABDOMINAL PAIN, LEFT LOWER QUADRANT: ICD-10-CM

## 2017-09-29 DIAGNOSIS — R10.31 ABDOMINAL PAIN, RIGHT LOWER QUADRANT: ICD-10-CM

## 2017-09-29 DIAGNOSIS — R10.11 ABDOMINAL PAIN, RIGHT UPPER QUADRANT: ICD-10-CM

## 2017-09-29 DIAGNOSIS — R14.0 ABDOMINAL DISTENTION: ICD-10-CM

## 2017-09-29 PROCEDURE — 76700 US EXAM ABDOM COMPLETE: CPT

## 2017-10-06 ENCOUNTER — HOSPITAL ENCOUNTER (OUTPATIENT)
Dept: LAB | Facility: MEDICAL CENTER | Age: 57
End: 2017-10-06
Attending: OBSTETRICS & GYNECOLOGY
Payer: COMMERCIAL

## 2017-10-06 LAB
25(OH)D3 SERPL-MCNC: 42 NG/ML (ref 30–100)
CANCER AG125 SERPL-ACNC: 9.7 U/ML (ref 0–35)

## 2017-10-06 PROCEDURE — 82306 VITAMIN D 25 HYDROXY: CPT

## 2017-10-06 PROCEDURE — 36415 COLL VENOUS BLD VENIPUNCTURE: CPT

## 2017-10-06 PROCEDURE — 86304 IMMUNOASSAY TUMOR CA 125: CPT

## 2017-10-11 ENCOUNTER — PATIENT MESSAGE (OUTPATIENT)
Dept: MEDICAL GROUP | Facility: PHYSICIAN GROUP | Age: 57
End: 2017-10-11

## 2017-10-11 DIAGNOSIS — Z12.11 SCREENING FOR COLON CANCER: ICD-10-CM

## 2017-10-11 DIAGNOSIS — Z12.11 COLON CANCER SCREENING: ICD-10-CM

## 2017-10-11 NOTE — TELEPHONE ENCOUNTER
"----- Message from Koki Townsend sent at 10/11/2017 11:49 AM PDT -----  Regarding: RE: Procedure Question  Contact: [phone]  Colonoscopy  ----- Message -----  From: Yfn Zheng Ass't  Sent: 10/11/2017 11:40 AM PDT  To: Koki Townsend  Subject: RE: Procedure Question  What are you being seen for     ----- Message -----     From: Koki Townsend     Sent: 10/11/2017 10:59 AM PDT       To: ZIGGY Carter  Subject: Procedure Question    Rhonda,  I used to see Jane, now I will be seeing Paulie OGFF I have a doctor appointment with [facility] on October 26,2017, she informed me that I have to have you send an Authorization form/request to them in order for me to see them. She stated to david it \"Urgent\" and to fax it to their fax number at: [phone].    If you have any questions please give me a call at [phone], and or let me know if you can do this.   Thank you,  Koki Townsend  "

## 2017-11-03 ENCOUNTER — NON-PROVIDER VISIT (OUTPATIENT)
Dept: HEALTH INFORMATION MANAGEMENT | Facility: MEDICAL CENTER | Age: 57
End: 2017-11-03
Payer: COMMERCIAL

## 2017-11-03 VITALS — HEIGHT: 62 IN | BODY MASS INDEX: 33.21 KG/M2 | WEIGHT: 180.5 LBS

## 2017-11-03 DIAGNOSIS — E66.9 OBESITY (BMI 30-39.9): ICD-10-CM

## 2017-11-03 PROCEDURE — 97803 MED NUTRITION INDIV SUBSEQ: CPT | Performed by: DIETITIAN, REGISTERED

## 2017-11-03 NOTE — PROGRESS NOTES
"Nutrition Reassess    11/3/2017    ZIGGY Carter   56 y.o.   Time in/out:  1516 - 1534    Subjective:  -States she has fallen out of her new habits and gone back to eating a lot when she is bored and is drinking soda again  -Admits she has consumed a lot of Halloween candy  -No regular exercise, although she did start doing it at one point    Anthropometrics/Objective  Vitals:    11/03/17 1533   Weight: 81.9 kg (180 lb 8 oz)   Height: 1.575 m (5' 2\")     Body mass index is 33.01 kg/m².    Previous weight/date: 175.3#  Change:  + 5.2# (+ 0.2# total)           ReAssesment/Notes:  Koki wants to get back to eating salads, drinking her protein shake for breakfast, and increasing activity.  She is aware of her bored eating and is not eating mindlessly because she makes the choice to do it; because she has that kind of awareness she can make improvements to her bored eating.  She is going to get up and walk if she knows she wants to eat and is not hungry, which should give her brain and endorphin release that it is looking for from food.  We have set a goal to maintain weight during this holiday season, so we will be discussing how to eat between Lisandra and Ifeanyi at our next appointment.    Follow-up: 1 month    "

## 2017-12-15 ENCOUNTER — NON-PROVIDER VISIT (OUTPATIENT)
Dept: HEALTH INFORMATION MANAGEMENT | Facility: MEDICAL CENTER | Age: 57
End: 2017-12-15
Payer: COMMERCIAL

## 2017-12-15 VITALS — BODY MASS INDEX: 32.76 KG/M2 | HEIGHT: 62 IN | WEIGHT: 178 LBS

## 2017-12-15 DIAGNOSIS — E66.9 OBESITY (BMI 30-39.9): ICD-10-CM

## 2017-12-15 PROCEDURE — 97803 MED NUTRITION INDIV SUBSEQ: CPT | Performed by: DIETITIAN, REGISTERED

## 2017-12-15 NOTE — PROGRESS NOTES
"Nutrition Reassess    12/15/2017    ZIGGY Carter   57 y.o.   Time in/out:  955 - 1011    Subjective:  -Not drinking soda any longer  -Is drinking protein shake for breakfast again  -Has started eating 1-2 frozen meals per day, usually Smart Ones or Lean Cuisines  -No regular exercise    Anthropometrics/Objective  Vitals:    12/15/17 1022   Weight: 80.7 kg (178 lb)   Height: 1.575 m (5' 2\")     Body mass index is 32.56 kg/m².    Previous weight/date: 180.5#  Change:  - 2.5# (- 2.3# total)      ReAssesment/Notes:  Koki likes using the frozen meals because they are quick and easy for her to turn to, especially for dinner.  She has not been eating salads as much for lunch because it is cold and she does not want them as often.  I want to see her again in a few weeks; Koki is going to think about what she wants to achieve in 2018 and we will come up with an action plan for her to achieve these goals.  We will likely look at kcal needs and also some discussion about how she can lower her cholesterol through food/exercise.    Follow-up: 6 weeks    "

## 2017-12-26 ENCOUNTER — OFFICE VISIT (OUTPATIENT)
Dept: URGENT CARE | Facility: PHYSICIAN GROUP | Age: 57
End: 2017-12-26
Payer: COMMERCIAL

## 2017-12-26 ENCOUNTER — HOSPITAL ENCOUNTER (OUTPATIENT)
Facility: MEDICAL CENTER | Age: 57
End: 2017-12-26
Attending: NURSE PRACTITIONER
Payer: COMMERCIAL

## 2017-12-26 VITALS
TEMPERATURE: 98 F | HEIGHT: 62 IN | OXYGEN SATURATION: 96 % | DIASTOLIC BLOOD PRESSURE: 86 MMHG | SYSTOLIC BLOOD PRESSURE: 142 MMHG | BODY MASS INDEX: 32.76 KG/M2 | WEIGHT: 178 LBS | HEART RATE: 75 BPM

## 2017-12-26 DIAGNOSIS — R82.90 BAD ODOR OF URINE: ICD-10-CM

## 2017-12-26 DIAGNOSIS — N39.0 URINARY TRACT INFECTION WITH HEMATURIA, SITE UNSPECIFIED: ICD-10-CM

## 2017-12-26 DIAGNOSIS — R31.9 URINARY TRACT INFECTION WITH HEMATURIA, SITE UNSPECIFIED: ICD-10-CM

## 2017-12-26 DIAGNOSIS — N90.89 LABIAL LESION: ICD-10-CM

## 2017-12-26 LAB
APPEARANCE UR: CLEAR
BILIRUB UR STRIP-MCNC: NORMAL MG/DL
COLOR UR AUTO: YELLOW
GLUCOSE UR STRIP.AUTO-MCNC: NORMAL MG/DL
KETONES UR STRIP.AUTO-MCNC: NORMAL MG/DL
LEUKOCYTE ESTERASE UR QL STRIP.AUTO: NORMAL
NITRITE UR QL STRIP.AUTO: NORMAL
PH UR STRIP.AUTO: 6 [PH] (ref 5–8)
PROT UR QL STRIP: NORMAL MG/DL
RBC UR QL AUTO: NORMAL
SP GR UR STRIP.AUTO: 1.02
UROBILINOGEN UR STRIP-MCNC: NORMAL MG/DL

## 2017-12-26 PROCEDURE — 87086 URINE CULTURE/COLONY COUNT: CPT

## 2017-12-26 PROCEDURE — 99214 OFFICE O/P EST MOD 30 MIN: CPT | Performed by: NURSE PRACTITIONER

## 2017-12-26 PROCEDURE — 81002 URINALYSIS NONAUTO W/O SCOPE: CPT | Performed by: NURSE PRACTITIONER

## 2017-12-26 RX ORDER — CEFUROXIME AXETIL 250 MG/1
250 TABLET ORAL 2 TIMES DAILY
Qty: 14 TAB | Refills: 0 | Status: SHIPPED | OUTPATIENT
Start: 2017-12-26 | End: 2018-01-04

## 2017-12-26 RX ORDER — NITROFURANTOIN 25; 75 MG/1; MG/1
100 CAPSULE ORAL 2 TIMES DAILY
Qty: 14 CAP | Refills: 0 | Status: CANCELLED | OUTPATIENT
Start: 2017-12-26 | End: 2018-01-02

## 2017-12-26 ASSESSMENT — ENCOUNTER SYMPTOMS
HEADACHES: 0
DIARRHEA: 0
FEVER: 0
NAUSEA: 0
ABDOMINAL PAIN: 0
VOMITING: 0
MYALGIAS: 0
WEAKNESS: 0
CHILLS: 0
FLANK PAIN: 0
CONSTIPATION: 0

## 2017-12-27 DIAGNOSIS — R31.9 URINARY TRACT INFECTION WITH HEMATURIA, SITE UNSPECIFIED: ICD-10-CM

## 2017-12-27 DIAGNOSIS — N39.0 URINARY TRACT INFECTION WITH HEMATURIA, SITE UNSPECIFIED: ICD-10-CM

## 2017-12-27 NOTE — PROGRESS NOTES
Subjective:      Koki Townsend is a 57 y.o. female who presents with Cyst (cyst in genital area)            HPI  Koki has noticed a lump in vaginal area in the last week. Denies bleeding but states irritation with wearing pants/clothing. Admit to not wearing underwear. Denies vaginal d/c and has hs not been sexually active in 6 years. Admits to foul odor of urine. States saw gynecologist 2 weeks ago and did not mention growth in vaginal area.    PMH:  has a past medical history of Anxiety; Chickenpox; Upper sorbian measles; Hypertension; Hypothyroidism; Nasal drainage; 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; Sleep apnea; Thyroid disease; and Tonsillitis. She also has no past medical history of Depression or Hyperlipidemia.  MEDS:   Current Outpatient Prescriptions:   •  cefUROXime (CEFTIN) 250 MG Tab, Take 1 Tab by mouth 2 times a day., Disp: 14 Tab, Rfl: 0  •  escitalopram (LEXAPRO) 20 MG tablet, Take 1 Tab by mouth every day., Disp: 90 Tab, Rfl: 3  •  solifenacin (VESICARE) 5 MG tablet, Take 2 Tabs by mouth every day., Disp: 180 Tab, Rfl: 3  •  simvastatin (ZOCOR) 20 MG Tab, Take 1 Tab by mouth every evening., Disp: 90 Tab, Rfl: 3  •  levothyroxine (SYNTHROID) 100 MCG Tab, Take 1 Tab by mouth every day., Disp: 90 Tab, Rfl: 1  •  enalapril-hydrochlorthiazide (VASERETIC) 10-25 MG per tablet, Take 0.5 Tabs by mouth every day., Disp: 90 Tab, Rfl: 3  •  lorazepam (ATIVAN) 1 MG Tab, Take 1 Tab by mouth at bedtime as needed for Anxiety., Disp: 30 Tab, Rfl: 2  •  azithromycin (ZITHROMAX) 250 MG Tab, 2 TABS ON DAY 1, 1 TAB ON DAYS 2-5., Disp: 6 Tab, Rfl: 0  •  MethylPREDNISolone (MEDROL DOSEPAK) 4 MG Tablet Therapy Pack, TAKE AS DIRECTED ON PACKAGE., Disp: 21 Tab, Rfl: 0  •  benzonatate (TESSALON) 200 MG capsule, 1 CAP UP TO 3 TIMES A DAY ONLY IF NEEDED FOR COUGH., Disp: 30 Cap, Rfl: 0  •  benzonatate (TESSALON) 100 MG Cap, Take 1 Cap by mouth 3 times a day as needed for Cough., Disp: 60 Cap, Rfl:  "0  •  Cholecalciferol (VITAMIN D3) 2000 UNIT Cap, Take  by mouth every day., Disp: , Rfl:   ALLERGIES:   Allergies   Allergen Reactions   • Codeine Itching     SURGHX:   Past Surgical History:   Procedure Laterality Date   • SINUSCOPE     • THYROIDECTOMY      Left lobe removed   • TONSILLECTOMY     • TUBAL LIGATION       SOCHX:  reports that she is a non-smoker but has been exposed to tobacco smoke. She has never used smokeless tobacco. She reports that she drinks alcohol. She reports that she does not use drugs.  FH: Family history was reviewed, no pertinent findings to report    Review of Systems   Constitutional: Negative for chills, fever and malaise/fatigue.   Gastrointestinal: Negative for abdominal pain, constipation, diarrhea, nausea and vomiting.   Genitourinary: Negative for dysuria, flank pain, frequency, hematuria and urgency.   Musculoskeletal: Negative for myalgias.   Skin: Negative for itching and rash.   Neurological: Negative for weakness and headaches.   All other systems reviewed and are negative.         Objective:     /86   Pulse 75   Temp 36.7 °C (98 °F)   Ht 1.575 m (5' 2\")   Wt 80.7 kg (178 lb)   SpO2 96%   BMI 32.56 kg/m²      Physical Exam   Constitutional: She is oriented to person, place, and time. Vital signs are normal. She appears well-developed and well-nourished. She is active and cooperative.  Non-toxic appearance. She does not have a sickly appearance. She does not appear ill. No distress.   HENT:   Head: Normocephalic.   Eyes: Conjunctivae and EOM are normal. Pupils are equal, round, and reactive to light.   Neck: Normal range of motion. Neck supple.   Cardiovascular: Normal rate.    Pulmonary/Chest: Effort normal.   Abdominal: Soft. Bowel sounds are normal. She exhibits no distension. There is no tenderness. There is no rigidity, no rebound and no guarding.   Genitourinary:       Pelvic exam was performed with patient supine. There is lesion on the left labia. There " is no rash, tenderness or injury on the left labia.   Genitourinary Comments: 2cm irregularly square shaped lesion on left side of external labia just outside vaginal opening without redness, swelling or bleeding. TTP at site where what appears to be a scab on lesion.   Musculoskeletal: Normal range of motion.   Neurological: She is alert and oriented to person, place, and time.   Skin: Skin is warm and dry. She is not diaphoretic.   Vitals reviewed.    POC Color yellow Negative Final   POC Appearance clear Negative Final   POC Leukocyte Esterase small Negative Final   POC Nitrites neg Negative Final   POC Urobiligen neg Negative (0.2) mg/dL Final   POC Protein neg Negative mg/dL Final   POC Urine PH 6.0 5.0 - 8.0 Final   POC Blood small Negative Final   POC Specific Gravity 1.020 <1.005 - >1.030 Final   POC Ketones neg Negative mg/dL Final   POC Biliruben neg Negative mg/dL Final   POC Glucose neg Negative mg/dL Final               Assessment/Plan:     1. Bad odor of urine    - POCT Urinalysis    2. Labial lesion      3. Urinary tract infection with hematuria, site unspecified    - URINE CULTURE(NEW); Future  - cefUROXime (CEFTIN) 250 MG Tab; Take 1 Tab by mouth 2 times a day.  Dispense: 14 Tab; Refill: 0    Increase water intake  Urinate more frequently and empty bladder completely  Practice good toileting hygiene after bowel movements and sexual intercourse, refrain from sexual intercourse until infection cleared  Monitor for back/flank pain, difficulty urinating, blood in urine- need re-evaluation  Follow up with gynecologist regarding lesion to be removed

## 2017-12-29 LAB
BACTERIA UR CULT: NORMAL
SIGNIFICANT IND 70042: NORMAL
SITE SITE: NORMAL
SOURCE SOURCE: NORMAL

## 2018-01-04 ENCOUNTER — OFFICE VISIT (OUTPATIENT)
Dept: MEDICAL GROUP | Facility: PHYSICIAN GROUP | Age: 58
End: 2018-01-04
Payer: COMMERCIAL

## 2018-01-04 VITALS
SYSTOLIC BLOOD PRESSURE: 138 MMHG | HEART RATE: 70 BPM | WEIGHT: 180 LBS | BODY MASS INDEX: 33.13 KG/M2 | HEIGHT: 62 IN | TEMPERATURE: 98.7 F | DIASTOLIC BLOOD PRESSURE: 80 MMHG | RESPIRATION RATE: 16 BRPM | OXYGEN SATURATION: 93 %

## 2018-01-04 DIAGNOSIS — F41.1 GAD (GENERALIZED ANXIETY DISORDER): ICD-10-CM

## 2018-01-04 DIAGNOSIS — I10 ESSENTIAL HYPERTENSION: ICD-10-CM

## 2018-01-04 DIAGNOSIS — E03.9 HYPOTHYROIDISM, UNSPECIFIED TYPE: ICD-10-CM

## 2018-01-04 PROCEDURE — 99214 OFFICE O/P EST MOD 30 MIN: CPT | Performed by: NURSE PRACTITIONER

## 2018-01-04 RX ORDER — LORAZEPAM 1 MG/1
1 TABLET ORAL 2 TIMES DAILY PRN
Qty: 60 TAB | Refills: 2 | Status: SHIPPED | OUTPATIENT
Start: 2018-01-04 | End: 2018-02-03

## 2018-01-04 RX ORDER — LEVOTHYROXINE SODIUM 0.1 MG/1
100 TABLET ORAL
Qty: 90 TAB | Refills: 3 | Status: SHIPPED | OUTPATIENT
Start: 2018-01-04 | End: 2018-04-03

## 2018-01-04 ASSESSMENT — PATIENT HEALTH QUESTIONNAIRE - PHQ9: CLINICAL INTERPRETATION OF PHQ2 SCORE: 0

## 2018-01-05 NOTE — PROGRESS NOTES
Chief Complaint   Patient presents with   • Follow-Up     6 months    • Medication Refill       HISTORY OF PRESENT ILLNESS: Patient is a 57 y.o. female established patient who presents today to discuss the following issues:    ARMEN (generalized anxiety disorder)  Has been under a lot of stress.  She would like to decrease her Lexapro to 10 mg because she states that she is unable to cry.  She has been taking Ativan 1-2 times a day, but her prescription is only written for daily use.  Her  was reviewed, and she has not been filling it early.  Discussed that I will increase her ativan to twice a day as needed, but that she should continue to take it as infrequently as possible.  Patient verbalizes understanding and agrees with plan.    Hypothyroidism  Chronic in nature, stable on meds.    Essential hypertension  Chronic in nature, stables on meds.    BMI 32.0-32.9,adult  Patient is aware of BMI elevation.  Brief discussion of diet, exercise, and lifestyle modification.        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 60 Tab 2   • levothyroxine (SYNTHROID) 100 MCG Tab Take 1 Tab by mouth every day. 90 Tab 3   • enalapril-hydrochlorthiazide (VASERETIC) 10-25 MG per tablet Take 0.5 Tabs by mouth every day. 90 Tab 3   • escitalopram (LEXAPRO) 20 MG tablet Take 1 Tab by mouth every day. 90 Tab 3   • solifenacin  "(VESICARE) 5 MG tablet Take 2 Tabs by mouth every day. 180 Tab 3   • simvastatin (ZOCOR) 20 MG Tab Take 1 Tab by mouth every evening. 90 Tab 3   • Cholecalciferol (VITAMIN D3) 2000 UNIT Cap Take  by mouth every day.       No current facility-administered medications for this visit.        Social History   Substance Use Topics   • Smoking status: Passive Smoke Exposure - Never Smoker   • Smokeless tobacco: Never Used   • Alcohol use 0.0 oz/week      Comment: rarely       Family Status   Relation Status   • Mother Alive   • Father  at age 59    Lung Cancer   • Sister Alive   • Brother Alive   • Brother Alive   • Son Alive   • Daughter Alive   • Daughter Alive   • Neg Hx      Family History   Problem Relation Age of Onset   • Heart Failure Mother    • Diabetes Mother    • Cancer Father      lung-abest   • Lung Cancer Father    • Sleep Apnea Brother    • Stroke Neg Hx        Review of Systems:   Constitutional: Negative for fever, chills, weight loss and malaise/fatigue.   HENT: Negative for ear pain, nosebleeds, congestion, sore throat and neck pain.    Eyes: Negative for blurred vision.   Respiratory: Negative for cough, sputum production, shortness of breath and wheezing.    Cardiovascular: Negative for chest pain, palpitations, orthopnea and leg swelling.   Gastrointestinal: Negative for heartburn, nausea, vomiting and abdominal pain.   Genitourinary: Negative for dysuria, urgency and frequency.   Musculoskeletal: Negative for myalgias, joint pain, and back pain.  Skin: Negative for rash and itching.   Neurological: Negative for dizziness, tingling, tremors, sensory change, focal weakness and headaches.   Endo/Heme/Allergies: Does not bruise/bleed easily.   Psychiatric/Behavioral: Positive for depression and anxiety, denies SI/HI.  All other systems reviewed and are negative except as in HPI.    Exam:  Blood pressure 138/80, pulse 70, temperature 37.1 °C (98.7 °F), resp. rate 16, height 1.575 m (5' 2\"), " weight 81.6 kg (180 lb), SpO2 93 %.  General:  Well nourished, well developed female in NAD  Head: Grossly normal.  Neck: Supple without JVD or bruit. Thyroid is not enlarged.  Pulmonary: Clear to ausculation. Normal effort. No rales, ronchi, or wheezing.  Cardiovascular: Regular rate and rhythm without murmur.   Abdomen:  Soft, nontender, nondistended.  Extremities: No clubbing, cyanosis, or edema.  Skin: Intact with no obvious rashes or lesions.  Neuro: Grossly intact.  Psych: Alert and oriented x 3.  Mood and affect appropriate.    Medical decision-making and discussion: Koki is here today for follow-up. Her Ativan and synthroid were refilled, and she will schedule an appointment to establish care.  She will follow-up here as needed.          Assessment/Plan:  1. ARMEN (generalized anxiety disorder)  lorazepam (ATIVAN) 1 MG Tab   2. Hypothyroidism, unspecified type  levothyroxine (SYNTHROID) 100 MCG Tab   3. BMI 32.0-32.9,adult  Patient identified as having weight management issue.  Appropriate orders and counseling given.   4. Essential hypertension         Return if symptoms worsen or fail to improve.    Please note that this dictation was created using voice recognition software. I have made every reasonable attempt to correct obvious errors, but I expect that there are errors of grammar and possibly content that I did not discover before finalizing the note.

## 2018-01-05 NOTE — ASSESSMENT & PLAN NOTE
Has been under a lot of stress.  She would like to decrease her Lexapro to 10 mg because she states that she is unable to cry.  She has been taking Ativan 1-2 times a day, but her prescription is only written for daily use.  Her  was reviewed, and she has not been filling it early.  Discussed that I will increase her ativan to twice a day as needed, but that she should continue to take it as infrequently as possible.  Patient verbalizes understanding and agrees with plan.

## 2018-02-02 ENCOUNTER — APPOINTMENT (OUTPATIENT)
Dept: HEALTH INFORMATION MANAGEMENT | Facility: MEDICAL CENTER | Age: 58
End: 2018-02-02
Payer: COMMERCIAL

## 2018-02-16 ENCOUNTER — NON-PROVIDER VISIT (OUTPATIENT)
Dept: HEALTH INFORMATION MANAGEMENT | Facility: MEDICAL CENTER | Age: 58
End: 2018-02-16
Payer: COMMERCIAL

## 2018-02-16 ENCOUNTER — TELEPHONE (OUTPATIENT)
Dept: MEDICAL GROUP | Facility: PHYSICIAN GROUP | Age: 58
End: 2018-02-16

## 2018-02-16 VITALS — HEIGHT: 62 IN | BODY MASS INDEX: 33.31 KG/M2 | WEIGHT: 181 LBS

## 2018-02-16 DIAGNOSIS — E66.9 OBESITY (BMI 30-39.9): ICD-10-CM

## 2018-02-16 PROCEDURE — 97803 MED NUTRITION INDIV SUBSEQ: CPT | Performed by: DIETITIAN, REGISTERED

## 2018-02-16 NOTE — PROGRESS NOTES
"Nutrition Reassess    2/16/2018    ZIGGY Zaidi   57 y.o.   Time in/out:  1429 - 1444    Subjective:  -Started drinking a liter soda daily again  -Eating candy often  -Trying to get back to eating salads and drinking a protein shake as a meal replacement    Anthropometrics/Objective  Vitals:    02/16/18 1447   Weight: 82.1 kg (181 lb)   Height: 1.575 m (5' 2\")     Body mass index is 33.11 kg/m².    Previous weight/date: 178#  Change:  + 3# (+ 0.7# total)    ReAssesment/Notes:  Koki admits that she has been eating salads and drinking the protein shakes again because she knew our appointment was coming up.  She does not have motivation to change much at this time and is back to drinking soda because she thought she could limit herself and not drink it daily.    Koki needs to stop drinking soda immediately and needs to treat soda and candy as addictions, which they probably are. She is interested in getting on medication to help decrease her hunger, so she would like to see our director of medical weight management, Dr. Wells.  I have instructed her on how to get the referral for this and she states she will do so now.  In the meantime, she is to stop drinking soda and stop eating candy immediately.  She agrees to this and will try to do so.    Follow-up: prn    "

## 2018-02-16 NOTE — TELEPHONE ENCOUNTER
1. Caller Name: Koki Townsend                                           Call Back Number: [phone] (home)         Patient approves a detailed voicemail message: N\A    Pt saw Dr Hidalgo today and he would like her referred to Dr Munson for weight management and nutrition services.

## 2018-03-07 ENCOUNTER — NON-PROVIDER VISIT (OUTPATIENT)
Dept: HEALTH INFORMATION MANAGEMENT | Facility: MEDICAL CENTER | Age: 58
End: 2018-03-07
Payer: COMMERCIAL

## 2018-03-07 VITALS
DIASTOLIC BLOOD PRESSURE: 80 MMHG | OXYGEN SATURATION: 98 % | BODY MASS INDEX: 32.57 KG/M2 | HEART RATE: 74 BPM | HEIGHT: 62 IN | SYSTOLIC BLOOD PRESSURE: 116 MMHG | TEMPERATURE: 97.4 F | WEIGHT: 177 LBS

## 2018-03-07 DIAGNOSIS — F41.1 GAD (GENERALIZED ANXIETY DISORDER): ICD-10-CM

## 2018-03-07 DIAGNOSIS — G47.33 OBSTRUCTIVE SLEEP APNEA: ICD-10-CM

## 2018-03-07 DIAGNOSIS — R73.01 IMPAIRED FASTING BLOOD SUGAR: ICD-10-CM

## 2018-03-07 DIAGNOSIS — I10 ESSENTIAL HYPERTENSION: ICD-10-CM

## 2018-03-07 PROCEDURE — 97803 MED NUTRITION INDIV SUBSEQ: CPT | Performed by: DIETITIAN, REGISTERED

## 2018-03-07 PROCEDURE — 99205 OFFICE O/P NEW HI 60 MIN: CPT | Mod: 25 | Performed by: INTERNAL MEDICINE

## 2018-03-07 PROCEDURE — 93000 ELECTROCARDIOGRAM COMPLETE: CPT | Performed by: INTERNAL MEDICINE

## 2018-03-07 NOTE — PROGRESS NOTES
"Bariatric Medicine H&P  Chief Complaint   Patient presents with   • Weight Gain       Referred by: Paulie MATTA    History of Present Illness:   Koki Townsend is a 57 y.o.  female who presents for weight management and to help address co-morbidities related to overweight, including EVI, impaired fasting glucose, chronic kidney disease, hypertension, hypercholesterolemia, vitamin D deficiency.    The patient presents uncomfortable with her weight gain, and upset that she has sleep apnea and requires BiPAP. She knows she needs to lose weight.    The patient does meal prep with her daughter, tries to make portions ahead. She feels she just \"eats to eat\" and is emotional eater. She has been in a weight loss program in 2008, had some gradual loss, got tired of eating what she felt was the same thing all the time.    The patient is currently eating a special K protein supplement shake for breakfast, meat and vegetables or sandwich for lunch, whatever her family eats for dinner. She says by 3 in the afternoon after work, she eats a lot more, and as the night goes on when she gets bored she continues to eat. Does not track her calories, would be willing to do so.    Has had cardiology evaluation 2017 for her abnormal EKG, felt no further workup required and is not following up with cardiology. On BiPAP for EVI. Not checking blood sugars regularly. Blood pressure has been under control on her Vaseretic. On a statin. On vitamin D repletion.    Behavior-Related History:  Binge eating screen: Positive  Emotional eater     Exercise:   Walking 2-3 days per week, playing with her grandkids when she is able in the evenings    Review of Systems   Snoring while asleep.  Sleep apnea screen: Positive, wears BiPAP.  All other ROS were reviewed with patient today and are negative.      PMH/PSH:  I have reviewed the patient's medical, social and family history, allergies, and medications today.  Prior records " "reviewed.  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    HENT: No facial plethora.  No Cushingoid features.  No scalloped tongue.  No dental erosions.  No swollen parotids.  Head: Normocephalic.   Eyes: EOM are normal. Pupils are equal, round, and reactive to light. No periorbital edema.  No lateral thinning of eyebrows.  No vertical nystagmus.  Neck: Normal range of motion. Neck supple. No thyromegaly present. No buffalo hump.  Cardiovascular: Normal rate and regular rhythm.  No murmur heard.  Pulmonary/Chest: Effort normal and breath sounds normal. No wheezes.   Abdominal: Soft. Bowel sounds are normal. No pannus.  No ascites.  No hepatosplenomegaly.  No red striae.  Musculoskeletal: Normal range of motion. No edema.   Neurological: Alert and oriented to person, place, and time. Normal reflexes. No cranial nerve deficit. No muscle weakness.  Gait normal.   Skin: Warm and dry. Not diaphoretic. No hirsuitism.  No acanthosis nigricans.  Not excessively dry, scaly.  No acne.  No bruising/ecchymosis.  No hyperpigmentation.  No xanthomas or acrochordon.  No violaceous striae.  No keratosis pilaris.  Psychiatric: Mood, memory, affect and judgment normal.     Laboratory:   Prior labs reviewed.  EKG: Sinus, right under branch block and left posterior fascicular block, rate 73, corrected QT 0.49  Ordered and reviewed by me today.    Dietitian Assessment: I have reviewed the Dietitian's assessment related to this encounter.       ASSESSMENT/PLAN:  Body mass index is 32.37 kg/m².   Obesity Stage ([place]) 2; Class 1    1. Impaired fasting blood sugar  EKG    COMP " METABOLIC PANEL   2. Essential hypertension  EKG    COMP METABOLIC PANEL   3. Obstructive sleep apnea     4. BMI 32.0-32.9,adult  EKG    TSH WITH REFLEX TO FT4   5. ARMEN (generalized anxiety disorder)       The patient is rightly concerned about her comorbidities with her obesity. We will restrict her carbohydrates, which help should help significantly, start tracking her intake, watch her blood glucose levels and hypertension as she progresses through the program.    The patient and I have discussed at length and agree to the following recommendations, which are all addressing the above diagnoses:    Weight Goal: 5% wt loss at one month after start (pt goal weight is 140 lb)  Diet:   Track intake with My Fitness Pal  Keep total carbs per day < 100 g and total protein > 100 g per day  Keep total daily kcal 1400 per day  One Robard meal replacement per day instead of special K breakfast drink  64+ oz water per day, eliminate soda  Physical Activity: Walking daily  Risk level for moderate/vigorous exercise program: Low  New Rx: None. Consider weight loss medications. Would avoid phentermine with generalized anxiety disorder.  Side Effects: Will review consent if applicable.  Behavior change: Mindful eating, tracking, stimulus narrowing  Consider referral to behavioral health for emotional eating  Handouts on emotional eating given today  Follow-up: 2 weeks    Face to face time spent 60 minutes,  with >50% of time devoted to one on one counseling on weight management issues, as documented above.      Thank you for your referral!

## 2018-03-07 NOTE — PATIENT INSTRUCTIONS
Track intake with My Fitness Pal  Keep total carbs per day < 100 g and total protein > 100 g per day  Keep total daily kcal 1400 per day  One Robard meal replacement per day  Daily walking  64+ oz water per day, eliminate soda

## 2018-03-07 NOTE — PROGRESS NOTES
"3/7/2018   Referring Provider: ZIGGY Zaidi       Time in/out:  2:11-2:38pm     Anthropometrics/Objective  Today's weight: 177lb  Height: 5'2\"  BMI: 32.37   Stated Goal Weight: 140lb  See comprehensive patient history form for further information     Subjective:  Pt is here today for the initial screening visit for the medical weight management program.   She had seen Jhony WELSH) from last July to last month and has not been able to follow the guidelines given.   She wants to be told exactly what to eat and what not to eat  Is interested in meal replacements but cannot afford Robard.   She cooks dinner for her grandchildren so ends up eating what they eat.   Breakfast has been a Special K protein shake. Or oatmeal with PB   Is drinking soda  See Medical Questionnaire for more detailed diet history     Nutrition Diagnosis (PES Statement)  · Obesity related to excessive energy intake and inadequate energy expenditure as evidenced by BMI >30      Client history:  Condition(s) associated with a diagnosis or treatment (specify) Hypothyroidism, CKD stage III, Pre-Diabetes     Biochemical data, medical test and procedures  Lab Results   Component Value Date/Time    HBA1C 5.9 (H) 05/23/2017 10:27 AM   @  No results found for: POCGLUCOSE  Lab Results   Component Value Date/Time    CHOLSTRLTOT 142 07/07/2017 07:42 AM    LDL 70 07/07/2017 07:42 AM    HDL 47 07/07/2017 07:42 AM    TRIGLYCERIDE 123 07/07/2017 07:42 AM         Nutrition Intervention  Nutrition Prescription  Recommended Daily Kcals: <1400 Kcal based on REE    Recommended Daily Protein: 100g     Meal Plan Recommendation   Recommend 1 meal replacement (choose over the counter option from list given)  with 2 grocery meals and 2 snacks per day    Meals= plate method using only 1 carb choice from list   Snacks = 1oz protein + ns veggies or 1/2 cup plain greek yogurt with 1/2 cup berries     Comprehensive Nutrition Education Instruction or training leading " to in-depth nutrition related knowledge about:  Combine carb, protein and fat at each meal, Meal timing and spacing, Menu Planning, Metabolism of carb, protein, fat, Portion control, Carbs to avoid list, Sweets and alcohol in moderation and Handouts provided regarding topics discussed     Monitoring & Evaluation Plan    Behavioral-Environmental:  Behavior: Keep a food journal and bring to next appointment - try my fitness pal or my plate Amina   Physical activity: not discussed today     Food / Nutrient Intake:  Food intake: Follow meal plan as discussed (see above). Avoid concentrated sweets and processed carbs.  Use plate method for meals and limit carbs to 1 choice from list given today or about 1/2 cup. Discussed specific options to have for her meals. Lunch will be salad with 2 eggs + 1oz cheese and dressing and dinner will be 4oz protein + veggies + 2fats and 0-1 carb choices.   Fluid intake: Consume at least 64 oz water per day. Avoid all unsweetened beverages. Limit coffee to 1 cup a day (ideally no cream or sugar). Avoid alcohol.     Physical Signs / Symptoms:  Weight change : -1-2lb per week or more       Assessment Notes:  Pt will get a new MR to use instead of Special K for breakfast. She will also plan out healthier meals and snacks. Specific guidelines were given today to assist pt with meal plan.  She will try tracking and that can be reviewed next visit on her food journal. Also may be helpful to give pt low carb meal ideas handout.       Follow up 2 weeks  Palma Dan, RD, LD, CDE  [phone]

## 2018-03-15 ENCOUNTER — HOSPITAL ENCOUNTER (OUTPATIENT)
Dept: LAB | Facility: MEDICAL CENTER | Age: 58
End: 2018-03-15
Attending: INTERNAL MEDICINE
Payer: COMMERCIAL

## 2018-03-15 ENCOUNTER — TELEPHONE (OUTPATIENT)
Dept: MEDICAL GROUP | Facility: PHYSICIAN GROUP | Age: 58
End: 2018-03-15

## 2018-03-15 ENCOUNTER — HOSPITAL ENCOUNTER (OUTPATIENT)
Dept: LAB | Facility: MEDICAL CENTER | Age: 58
End: 2018-03-15
Attending: OBSTETRICS & GYNECOLOGY
Payer: COMMERCIAL

## 2018-03-15 DIAGNOSIS — R73.01 IMPAIRED FASTING BLOOD SUGAR: ICD-10-CM

## 2018-03-15 DIAGNOSIS — H35.63 RETINAL HEMORRHAGE, BILATERAL: ICD-10-CM

## 2018-03-15 DIAGNOSIS — I10 ESSENTIAL HYPERTENSION: ICD-10-CM

## 2018-03-15 LAB
25(OH)D3 SERPL-MCNC: 33 NG/ML (ref 30–100)
ALBUMIN SERPL BCP-MCNC: 4 G/DL (ref 3.2–4.9)
ALBUMIN/GLOB SERPL: 1.1 G/DL
ALP SERPL-CCNC: 76 U/L (ref 30–99)
ALT SERPL-CCNC: 51 U/L (ref 2–50)
ANION GAP SERPL CALC-SCNC: 7 MMOL/L (ref 0–11.9)
AST SERPL-CCNC: 36 U/L (ref 12–45)
BILIRUB SERPL-MCNC: 0.6 MG/DL (ref 0.1–1.5)
BUN SERPL-MCNC: 18 MG/DL (ref 8–22)
CALCIUM SERPL-MCNC: 9.8 MG/DL (ref 8.5–10.5)
CHLORIDE SERPL-SCNC: 108 MMOL/L (ref 96–112)
CO2 SERPL-SCNC: 26 MMOL/L (ref 20–33)
CREAT SERPL-MCNC: 1.11 MG/DL (ref 0.5–1.4)
GLOBULIN SER CALC-MCNC: 3.5 G/DL (ref 1.9–3.5)
GLUCOSE SERPL-MCNC: 128 MG/DL (ref 65–99)
POTASSIUM SERPL-SCNC: 3.9 MMOL/L (ref 3.6–5.5)
PROT SERPL-MCNC: 7.5 G/DL (ref 6–8.2)
SODIUM SERPL-SCNC: 141 MMOL/L (ref 135–145)
T4 FREE SERPL-MCNC: 0.71 NG/DL (ref 0.53–1.43)
TSH SERPL DL<=0.005 MIU/L-ACNC: 8.78 UIU/ML (ref 0.38–5.33)

## 2018-03-15 PROCEDURE — 36415 COLL VENOUS BLD VENIPUNCTURE: CPT

## 2018-03-15 PROCEDURE — 84439 ASSAY OF FREE THYROXINE: CPT

## 2018-03-15 PROCEDURE — 84443 ASSAY THYROID STIM HORMONE: CPT

## 2018-03-15 PROCEDURE — 80053 COMPREHEN METABOLIC PANEL: CPT

## 2018-03-15 PROCEDURE — 82306 VITAMIN D 25 HYDROXY: CPT

## 2018-03-15 NOTE — TELEPHONE ENCOUNTER
Dr Huston notes scanned into media    1. Caller Name: Dr Huston                                         Call Back Number: [phone]      Patient approves a detailed voicemail message: N\A    2. SPECIFIC Action To Be Taken: Referral pending, please sign.    3. 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 Specialty & Provider Name/Lab/Imaging Location: Dr Izaguirre    5. Is appointment scheduled for requested order/referral: no    Patient informed they will receive a return phone call from the office ONLY if there are any questions before processing their request. Advised to call back if they haven't received a call from the referral department in 5 days.

## 2018-04-03 ENCOUNTER — OFFICE VISIT (OUTPATIENT)
Dept: HEALTH INFORMATION MANAGEMENT | Facility: MEDICAL CENTER | Age: 58
End: 2018-04-03
Payer: COMMERCIAL

## 2018-04-03 VITALS
BODY MASS INDEX: 33.51 KG/M2 | WEIGHT: 182.1 LBS | OXYGEN SATURATION: 94 % | HEART RATE: 78 BPM | HEIGHT: 62 IN | SYSTOLIC BLOOD PRESSURE: 120 MMHG | DIASTOLIC BLOOD PRESSURE: 80 MMHG

## 2018-04-03 DIAGNOSIS — E03.9 HYPOTHYROIDISM, UNSPECIFIED TYPE: ICD-10-CM

## 2018-04-03 DIAGNOSIS — I10 ESSENTIAL HYPERTENSION: ICD-10-CM

## 2018-04-03 DIAGNOSIS — G47.00 INSOMNIA, UNSPECIFIED TYPE: ICD-10-CM

## 2018-04-03 DIAGNOSIS — G47.33 OBSTRUCTIVE SLEEP APNEA: ICD-10-CM

## 2018-04-03 DIAGNOSIS — R73.01 IMPAIRED FASTING BLOOD SUGAR: ICD-10-CM

## 2018-04-03 PROCEDURE — 97803 MED NUTRITION INDIV SUBSEQ: CPT | Performed by: DIETITIAN, REGISTERED

## 2018-04-03 PROCEDURE — 99213 OFFICE O/P EST LOW 20 MIN: CPT | Performed by: INTERNAL MEDICINE

## 2018-04-03 RX ORDER — LEVOTHYROXINE SODIUM 0.12 MG/1
125 TABLET ORAL
Qty: 30 TAB | Refills: 1 | Status: SHIPPED | OUTPATIENT
Start: 2018-04-03 | End: 2018-07-09 | Stop reason: SDUPTHER

## 2018-04-03 NOTE — PATIENT INSTRUCTIONS
2 protein shakes daily, one in am and at dinner  Watch carb intake as previously discussed  Continue walking, goal would be 30 min daily

## 2018-04-03 NOTE — PROGRESS NOTES
"Nutrition Reassess: Medical Weight Management   Today's date: 4/3/2018  Referring Provider: Paulie Rodriguez A.P.*      Time: in/out 1501 - 1515    Subjective:  -States she is struggling with dinner because she is making dinner for her grandchildren and snacks while she is preparing food  -Wants to try a meal replacement for breakfast and dinner and was not sure if she can  -Trying to walk more consistently    Diet history:   Breakfast - Muscle Milk  Snack - vegetables and protein  Lunch - salad with canned chicken or hard-boiled egg  Snack - same as above  Dinner - pizza, macaroni and cheese, etc    Anthropometrics/Objective  Today's weight/height:    Vitals:    04/03/18 1439   BP: 120/80   Weight: 82.6 kg (182 lb 1.6 oz)   Height: 1.575 m (5' 2\")     BMI:  Body mass index is 33.31 kg/m².  Starting weight/date 177#     Total weight change : + 5.1#            Meal Plan:   LCD with 2 meal replacements per day     ReAssesment/Notes:    Koki is going to start drinking two meal replacements per day and not snack on any foods that she is cooking at dinner time because that is leading her to likely gain weight.  The meal replacement should help with keeping her satisfied as well.  She also will do a better job of making sure she snacks consistently since she occasionally gets too hungry and that leads her to want to eat more.      Koki's TSH level is elevated so she is having her medication increased.  She also has not been taking her medication on an empty stomach so she will now start taking it first thing in the morning and will not eat anything for at least 60 minutes after taking it.  Both of these should help her lose some weight and feel like she has more energy.    Follow-up: 1 month    "

## 2018-04-03 NOTE — PROGRESS NOTES
Bariatric Medicine Follow Up  Chief Complaint   Patient presents with   • Weight Gain       History of Present Illness:   Koki Townsend is a 57 y.o. female who presents for weight management follow-up and to help address co-morbidities related to overweight, including impaired fasting glucose, hypertension, EVI. Also with generalized anxiety disorder.    During the patient's last visit, the following were discussed and recommended:  Weight Goal: 5% wt loss at one month after start (pt goal weight is 140 lb)  Diet:   Track intake with My Fitness Pal  Keep total carbs per day < 100 g and total protein > 100 g per day  Keep total daily kcal 1400 per day  One Robard meal replacement per day instead of special K breakfast drink  64+ oz water per day, eliminate soda  Physical Activity: Walking daily  Risk level for moderate/vigorous exercise program: Low  New Rx: None. Consider weight loss medications. Would avoid phentermine with generalized anxiety disorder.  Side Effects: Will review consent if applicable.  Behavior change: Mindful eating, tracking, stimulus narrowing  Consider referral to behavioral health for emotional eating  Handouts on emotional eating given today  Follow-up: 2 weeks    The patient has struggled somewhat making further changes. She did stop soda. Is feeling less bloated as a result of stopping soda intake. She finds it works, she is able to control her eating quite well. She has a muscle milk every morning for breakfast and likes that a lot. She finds at home and in the evening she eats poorly. She makes her grandchildren dinner most nights, often does not plan and eats with them, which is not always the healthiest she admits. She could see herself using a second protein shake for dinner, with a small protein snack.    She is disappointed her weight and waist circumference have increased since her last visit. Has not been tracking her intake or using My Fitness Pal.    No change in sleeping, still  "using CPAP. Blood pressure overall has been controlled. Not checking glucoses herself. Anxiety controlled.    Exercise:   Walking, not tracking steps.     Review of Systems   Denies fatigue, lightheadedness.  All other ROS were reviewed and are otherwise unchanged from my previous visit with patient.    Physical Exam:    /80   Pulse 78   Ht 1.575 m (5' 2\")   Wt 82.6 kg (182 lb 1.6 oz)   SpO2 94%   BMI 33.31 kg/m²   Waist: 42 in  Body fat % 42  REE 1517 kcal/day    Weight change since last visit: +5 lb   Waist Circum change since last visit: +1 in    Constitutional: Oriented to person, place, and time and well-developed, well-nourished, and in no distress.    Head: Normocephalic.    Musculoskeletal: Normal range of motion. No edema.   Neurological: Alert and oriented to person, place, and time. No muscle weakness.  Gait normal.   Skin: Warm and dry. Not diaphoretic.   Psychiatric: Mood, memory, affect and judgment normal.     Laboratory:   3/15/18 TSH 8.78, vitamin D 33      Dietitian Assessment: I have reviewed the Dietitian's assessment related to this encounter.       ASSESSMENT/PLAN:  Body mass index is 33.31 kg/m².    Obesity Stage ([place]):  2; Class 1    1. BMI 32.0-32.9,adult     2. Insomnia, unspecified type     3. Obstructive sleep apnea     4. Essential hypertension     5. Impaired fasting blood sugar     6. Hypothyroidism, unspecified type       The patient has struggled to make changes. She has tried to decrease soda, is using a protein shake in the morning. We discussed strategies for improving her eating at night, which may include stimulus narrowing. Consider also weight loss medication pending course. We will increase Synthroid dosing given elevated TSH.    The patient and I have discussed at length and agree to the following recommendations, which are all addressing the above diagnoses:    Weight Goal: 3-5% wt loss each month (pt goal weight is 140 lb)  Diet: Reduce carbs to less than " 100 g per day  Muscle milk or premiere protein a.m. and dinner  Higher protein/low carb snacks between meals and for lunch  Physical Activity: Walking, goal is 30 minutes daily  Risk level for moderate/vigorous exercise program: Low  New Rx: Discontinue Synthroid 100 µg per day, start Synthroid 125 µg per day.  Side Effects: Will review consent if applicable.  Behavior change: More mindful eating, stimulus narrowing  Follow-up: One month  Recheck TSH in 3 months.

## 2018-04-26 DIAGNOSIS — G47.00 INSOMNIA, UNSPECIFIED TYPE: ICD-10-CM

## 2018-04-26 RX ORDER — ZOLPIDEM TARTRATE 10 MG/1
5-10 TABLET ORAL NIGHTLY PRN
Qty: 30 TAB | Refills: 0 | Status: SHIPPED
Start: 2018-04-26 | End: 2018-06-18 | Stop reason: SDUPTHER

## 2018-04-30 ENCOUNTER — OFFICE VISIT (OUTPATIENT)
Dept: HEALTH INFORMATION MANAGEMENT | Facility: MEDICAL CENTER | Age: 58
End: 2018-04-30
Payer: COMMERCIAL

## 2018-04-30 VITALS
HEART RATE: 70 BPM | WEIGHT: 177 LBS | DIASTOLIC BLOOD PRESSURE: 56 MMHG | OXYGEN SATURATION: 94 % | BODY MASS INDEX: 32.57 KG/M2 | SYSTOLIC BLOOD PRESSURE: 116 MMHG | HEIGHT: 62 IN

## 2018-04-30 DIAGNOSIS — R73.01 IMPAIRED FASTING BLOOD SUGAR: ICD-10-CM

## 2018-04-30 DIAGNOSIS — G47.33 OBSTRUCTIVE SLEEP APNEA: ICD-10-CM

## 2018-04-30 DIAGNOSIS — I10 ESSENTIAL HYPERTENSION: ICD-10-CM

## 2018-04-30 PROCEDURE — 97803 MED NUTRITION INDIV SUBSEQ: CPT | Performed by: DIETITIAN, REGISTERED

## 2018-04-30 PROCEDURE — 99213 OFFICE O/P EST LOW 20 MIN: CPT | Performed by: INTERNAL MEDICINE

## 2018-04-30 NOTE — PROGRESS NOTES
"Nutrition Reassess: Medical Weight Management   Today's date: 4/30/2018  Referring Provider: Paulie Rodriguez A.P.*      Time: in/out  4:35-4:50pm     Subjective:  Pt is pleased with her results this month   She has stopped eating so much pizza as she was doing the 1st month of this program  She no longer has to watch her grandchildren in the evenings so is not eating dinner with them. That has helped her to make healthier choices   Has cut back on sugar/sweets. Does still occasionally have ice cream or soda. But notices pain in her hand when she has soda. and is trying to cut out completely.   Has started to exercise. Does the Elliptical at the gym 60mins and weights some days too     Diet history:   Breakfast - Muscle Milk or Premier Protein   Snack - Nuts or cheese   Lunch - Salad with protein and 2T salad dressing   Snack - string cheese   Dinner - Protein drink Or Protein with veggies     Anthropometrics/Objective  Today's weight:    Vitals:    04/30/18 1602   BP: 116/56   Weight: 80.3 kg (177 lb)   Height: 1.575 m (5' 2\")     BMI:  Body mass index is 32.37 kg/m².  Starting weight/date 177lb     Total weight change : -0lb net weight loss            Meal Plan:   High protein diet with 1-2 meal replacements per day for B and D     ReAssesment/Notes:  Pt has started to implement some changes to her diet and has started to exercise. She is back to her starting weight but is feeling pleased with her progress. And most importantly her mindset has started to shift towards a healthier one. Gave list of snacks again and reviewed other protein drinks she can use for MR. Continue meal plan as is. Review diet hx next visit and go over more meal ideas. Continue to encourage pt to cut out added sugars     Follow-up: ~4 weeks (after trip to [facility])    "

## 2018-04-30 NOTE — PROGRESS NOTES
"Bariatric Medicine Follow Up  Chief Complaint   Patient presents with   • Weight Gain       History of Present Illness:   Koki Townsend is a 57 y.o. female who presents for weight management follow-up and to help address co-morbidities related to overweight, including insomnia, EVI, hypertension, impaired fasting glucose.    During the patient's last visit, the following were discussed and recommended:  Weight Goal: 3-5% wt loss each month (pt goal weight is 140 lb)  Diet: Reduce carbs to less than 100 g per day  Muscle milk or premiere protein a.m. and dinner  Higher protein/low carb snacks between meals and for lunch  Physical Activity: Walking, goal is 30 minutes daily  Risk level for moderate/vigorous exercise program: Low  New Rx: Discontinue Synthroid 100 µg per day, start Synthroid 125 µg per day.  Side Effects: Will review consent if applicable.  Behavior change: More mindful eating, stimulus narrowing  Follow-up: One month  Recheck TSH in 3 months.    The patient is proud she has managed to lose some weight and eat more protein, fewer carbohydrates. She has lost 5 pounds, 1 inch off her waist. She had been using muscle milk, would like to switch to premier protein. She has one protein meal replacement for breakfast daily. She has one for dinner 2 times per week on average. Watching carbohydrates and increasing protein when she has food meals.    She is taking her Synthroid now as prescribed, before the meal replacement every morning, feels well. Sleep is stable. Blood pressure controlled on Vasoretic. Not checking her blood sugars regularly. Continues on vitamin D repletion.    Exercise:   Elliptical 60 minutes, weights 20 minutes 3-4 times per week at the gym     Review of Systems   Denies constipation, lightheadedness, worsening insomnia.  All other ROS were reviewed and are otherwise unchanged from my previous visit with patient.    Physical Exam:    /56   Pulse 70   Ht 1.575 m (5' 2\")   Wt 80.3 " kg (177 lb)   SpO2 94%   BMI 32.37 kg/m²    Waist: 41 in  Body fat % 42  REE 1498 kcal/day    Weight change since last visit: -5 lb   Waist Circum change since last visit: -1 in    Constitutional: Oriented to person, place, and time and well-developed, well-nourished, and in no distress.    Head: Normocephalic.   Musculoskeletal: Normal range of motion. No edema.   Neurological: Alert and oriented to person, place, and time. No muscle weakness.  Gait normal.   Skin: Warm and dry. Not diaphoretic.   Psychiatric: Mood, memory, affect and judgment normal.     Laboratory:   None new.      Dietitian Assessment: I have reviewed the Dietitian's assessment related to this encounter.       ASSESSMENT/PLAN:  Body mass index is 32.37 kg/m².    Obesity Stage ([place]):  2; Class 1    1. BMI 32.0-32.9,adult     2. Obstructive sleep apnea     3. Essential hypertension     4. Impaired fasting blood sugar       The patient has begun to make some positive change.  Doing well limiting carbohydrates and increasing protein intake. Stimulus narrowing with meal replacement helps, as well as adjusting her Synthroid dose. Will continue to monitor sleep. Blood pressure well controlled. Recheck blood glucose after next visit if continues with weight loss.     The patient and I have discussed at length and agree to the following recommendations, which are all addressing the above diagnoses:    Weight Goal: 3-5% wt loss each month (pt goal weight is 140 lb)  Diet: Premier protein for breakfast daily, some dinners  High-protein/low carb snacks and meals  Keep total daily carbohydrates less than 100 g per day  64+ ounces water per day  Physical Activity: 60 minutes elliptical 3 times a week, weights at the gym  Risk level for moderate/vigorous exercise program: Low  New Rx: Continue current Synthroid 125 µg per day  Recheck TSH in about 2 months  Side Effects: Will review consent if applicable.  Behavior change: Mindful eating, stimulus  narrowing  Follow-up: One month

## 2018-06-11 ENCOUNTER — OFFICE VISIT (OUTPATIENT)
Dept: URGENT CARE | Facility: PHYSICIAN GROUP | Age: 58
End: 2018-06-11
Payer: COMMERCIAL

## 2018-06-11 ENCOUNTER — APPOINTMENT (OUTPATIENT)
Dept: MEDICAL GROUP | Facility: PHYSICIAN GROUP | Age: 58
End: 2018-06-11
Payer: COMMERCIAL

## 2018-06-11 VITALS
RESPIRATION RATE: 12 BRPM | BODY MASS INDEX: 31.65 KG/M2 | HEART RATE: 77 BPM | DIASTOLIC BLOOD PRESSURE: 72 MMHG | WEIGHT: 172 LBS | SYSTOLIC BLOOD PRESSURE: 120 MMHG | OXYGEN SATURATION: 96 % | HEIGHT: 62 IN | TEMPERATURE: 97.1 F

## 2018-06-11 DIAGNOSIS — J02.9 VIRAL PHARYNGITIS: ICD-10-CM

## 2018-06-11 DIAGNOSIS — J06.9 VIRAL URI: ICD-10-CM

## 2018-06-11 PROCEDURE — 99213 OFFICE O/P EST LOW 20 MIN: CPT | Performed by: NURSE PRACTITIONER

## 2018-06-11 RX ORDER — LORAZEPAM 1 MG/1
TABLET ORAL
COMMUNITY
Start: 2018-04-11 | End: 2018-06-18 | Stop reason: SDUPTHER

## 2018-06-11 ASSESSMENT — ENCOUNTER SYMPTOMS
HOARSE VOICE: 1
COUGH: 1
SORE THROAT: 1
FEVER: 0

## 2018-06-11 NOTE — PROGRESS NOTES
"Subjective:      Koki Townsend is a 57 y.o. female who presents with Pharyngitis (with body aches x last night )            Pharyngitis    This is a new problem. Episode onset: pt reports she developed ST and cough yesterday. + body aches, no fever. + sick contact with grandson with similar symptoms. The problem has been unchanged. Neither side of throat is experiencing more pain than the other. There has been no fever. The pain is mild. Associated symptoms include congestion, coughing, a hoarse voice and a plugged ear sensation. She has tried nothing for the symptoms.       Review of Systems   Constitutional: Positive for malaise/fatigue. Negative for fever.   HENT: Positive for congestion, hoarse voice and sore throat.    Respiratory: Positive for cough.    All other systems reviewed and are negative.    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     Social History Main Topics   • Smoking status: Passive Smoke Exposure - Never Smoker   • Smokeless tobacco: Never Used   • Alcohol use 0.0 oz/week      Comment: rarely   • Drug use: No   • Sexual activity: Not Currently     Partners: Male     Birth control/ protection: Post-Menopausal     Other Topics Concern   • Not on file     Social History Narrative   • No narrative on file          Objective:     /72   Pulse 77   Temp 36.2 °C (97.1 °F)   Resp 12   Ht 1.575 m (5' 2\")   Wt 78 kg (172 lb)   SpO2 " 96%   BMI 31.46 kg/m²      Physical Exam   Constitutional: She is oriented to person, place, and time. Vital signs are normal. She appears well-developed and well-nourished.   HENT:   Head: Normocephalic and atraumatic.   Right Ear: Tympanic membrane and external ear normal.   Left Ear: Tympanic membrane and external ear normal.   Nose: Rhinorrhea present.   Mouth/Throat: Posterior oropharyngeal erythema (mild) present.   Eyes: EOM are normal. Pupils are equal, round, and reactive to light.   Neck: Normal range of motion.   Cardiovascular: Normal rate and regular rhythm.    Pulmonary/Chest: Effort normal and breath sounds normal.   Musculoskeletal: Normal range of motion.   Lymphadenopathy:        Head (right side): Submandibular adenopathy present.        Head (left side): Submandibular adenopathy present.   Neurological: She is alert and oriented to person, place, and time.   Skin: Skin is warm and dry. Capillary refill takes less than 2 seconds.   Psychiatric: She has a normal mood and affect. Her speech is normal and behavior is normal. Thought content normal.   Vitals reviewed.              Assessment/Plan:     1. Viral pharyngitis    2. Viral URI    Advised pt that her symptoms are viral in nature, she likely contracted this from her grandson who attends  regularly and was with her last week. He is also sick at this time with very similar symptoms  Encouraged OTC medications to help alleviate and improve s/s  OTC zicam cold melt and elderberry pastilles encouraged  Increase water intake  Get plenty of rest  Good handwashing  Warm salt water gargles and throat lozenges  Supportive care, differential diagnoses, and indications for immediate follow-up discussed with patient.    Pathogenesis of diagnosis discussed including typical length and natural progression.      Instructed to return to  or nearest emergency department if symptoms fail to improve, for any change in condition, further concerns, or  new concerning symptoms.  Patient states understanding of the plan of care and discharge instructions.

## 2018-06-11 NOTE — LETTER
June 11, 2018         Patient: Koki Townsend   YOB: 1960   Date of Visit: 6/11/2018           To Whom it May Concern:    Koki Townsend was seen in my clinic on 6/11/2018. Please excuse her from work 6/11/18.        Sincerely,           MIRTA Nickerson.  Electronically Signed

## 2018-06-18 ENCOUNTER — OFFICE VISIT (OUTPATIENT)
Dept: MEDICAL GROUP | Facility: PHYSICIAN GROUP | Age: 58
End: 2018-06-18
Payer: COMMERCIAL

## 2018-06-18 VITALS
OXYGEN SATURATION: 96 % | HEART RATE: 78 BPM | DIASTOLIC BLOOD PRESSURE: 88 MMHG | RESPIRATION RATE: 16 BRPM | TEMPERATURE: 98.8 F | HEIGHT: 62 IN | BODY MASS INDEX: 31.65 KG/M2 | SYSTOLIC BLOOD PRESSURE: 142 MMHG | WEIGHT: 172 LBS

## 2018-06-18 DIAGNOSIS — E03.9 HYPOTHYROIDISM, UNSPECIFIED TYPE: ICD-10-CM

## 2018-06-18 DIAGNOSIS — F41.1 GAD (GENERALIZED ANXIETY DISORDER): ICD-10-CM

## 2018-06-18 DIAGNOSIS — G47.00 INSOMNIA, UNSPECIFIED TYPE: ICD-10-CM

## 2018-06-18 DIAGNOSIS — I10 ESSENTIAL HYPERTENSION: ICD-10-CM

## 2018-06-18 DIAGNOSIS — F41.9 ANXIETY: ICD-10-CM

## 2018-06-18 PROCEDURE — 99214 OFFICE O/P EST MOD 30 MIN: CPT | Performed by: NURSE PRACTITIONER

## 2018-06-18 RX ORDER — ZOLPIDEM TARTRATE 10 MG/1
5-10 TABLET ORAL NIGHTLY PRN
Qty: 30 TAB | Refills: 5 | Status: SHIPPED | OUTPATIENT
Start: 2018-06-18 | End: 2018-07-18

## 2018-06-18 RX ORDER — LORAZEPAM 1 MG/1
1 TABLET ORAL
Qty: 30 TAB | Refills: 2 | Status: SHIPPED | OUTPATIENT
Start: 2018-06-18 | End: 2018-07-18

## 2018-06-19 DIAGNOSIS — G47.33 OBSTRUCTIVE SLEEP APNEA: ICD-10-CM

## 2018-06-20 ENCOUNTER — HOSPITAL ENCOUNTER (OUTPATIENT)
Facility: MEDICAL CENTER | Age: 58
End: 2018-06-20
Payer: COMMERCIAL

## 2018-06-20 LAB
BDY FAT % MEASURED: 38.6 %
BP DIAS: 86 MMHG
BP SYS: 148 MMHG
CHOLEST SERPL-MCNC: 154 MG/DL (ref 100–199)
DIABETES HTDIA: YES
EST. AVERAGE GLUCOSE BLD GHB EST-MCNC: 134 MG/DL
EVENT NAME HTEVT: NORMAL
FASTING HTFAS: YES
GLUCOSE SERPL-MCNC: 114 MG/DL (ref 65–99)
HBA1C MFR BLD: 6.3 % (ref 0–5.6)
HDLC SERPL-MCNC: 44 MG/DL
HYPERTENSION HTHYP: YES
LDLC SERPL CALC-MCNC: 80 MG/DL
SCREENING LOC CITY HTCIT: NORMAL
SCREENING LOC STATE HTSTA: NORMAL
SCREENING LOCATION HTLOC: NORMAL
SMOKING HTSMO: NO
SUBSCRIBER ID HTSID: NORMAL
TRIGL SERPL-MCNC: 150 MG/DL (ref 0–149)

## 2018-06-20 PROCEDURE — 83036 HEMOGLOBIN GLYCOSYLATED A1C: CPT

## 2018-06-20 PROCEDURE — S5190 WELLNESS ASSESSMENT BY NONPH: HCPCS

## 2018-06-20 PROCEDURE — 80061 LIPID PANEL: CPT

## 2018-06-20 PROCEDURE — 82947 ASSAY GLUCOSE BLOOD QUANT: CPT

## 2018-06-21 NOTE — PROGRESS NOTES
Chief Complaint   Patient presents with   • Follow-Up       HISTORY OF PRESENT ILLNESS: Patient is a 57 y.o. female established patient who presents today to discuss the following issues:    Insomnia  Insomnia is stable on xanax.  She would like a refill.     ARMEN (generalized anxiety disorder)  Anxiety is stable on lorazepam.  She would like a refill.    Essential hypertension  Chronic in nature.  Stable on meds.        Hypothyroidism  Chronic in nature.  Stable on meds.        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 30 Tab 5   • LORazepam (ATIVAN) 1 MG Tab Take 1 Tab by mouth 1 time daily as needed for Anxiety for up to 30 days. 30 Tab 2   • levothyroxine (SYNTHROID) 125 MCG Tab Take 1 Tab by mouth Every morning on an empty stomach. 30 Tab 1   • enalapril-hydrochlorthiazide (VASERETIC) 10-25 MG per tablet Take 0.5 Tabs by mouth every day. 90 Tab 3   • escitalopram (LEXAPRO) 20 MG tablet Take 1 Tab by mouth every day. (Patient taking differently: Take 10 mg by mouth every day.) 90 Tab 3   • solifenacin (VESICARE) 5 MG tablet Take 2 Tabs by mouth every day. 180 Tab 3   • simvastatin (ZOCOR) 20 MG Tab Take 1 Tab by mouth every evening. 90 Tab 3   • Cholecalciferol (VITAMIN D3) 2000 UNIT Cap Take  by mouth every day.       No current facility-administered medications for this visit.        Social History  "  Substance Use Topics   • Smoking status: Passive Smoke Exposure - Never Smoker   • Smokeless tobacco: Never Used   • Alcohol use 0.0 oz/week      Comment: rarely       Family Status   Relation Status   • Mother Alive   • Father  at age 59    Lung Cancer   • Sister Alive   • Brother Alive   • Brother Alive   • Son Alive   • Daughter Alive   • Daughter Alive   • Neg Hx      Family History   Problem Relation Age of Onset   • Heart Failure Mother    • Diabetes Mother    • Cancer Father      lung-abest   • Lung Cancer Father    • Sleep Apnea Brother    • Stroke Neg Hx        Review of Systems:   Constitutional: Negative for fever, chills, weight loss and malaise/fatigue.   HENT: Negative for ear pain, nosebleeds, congestion, sore throat and neck pain.    Eyes: Negative for blurred vision.   Respiratory: Negative for cough, sputum production, shortness of breath and wheezing.    Cardiovascular: Negative for chest pain, palpitations, orthopnea and leg swelling.   Gastrointestinal: Negative for heartburn, nausea, vomiting and abdominal pain.   Genitourinary: Negative for dysuria, urgency and frequency.   Musculoskeletal: Negative for myalgias, joint pain, and back pain.  Skin: Negative for rash and itching.   Neurological: Negative for dizziness, tingling, tremors, sensory change, focal weakness and headaches.   Endo/Heme/Allergies: Does not bruise/bleed easily.   Psychiatric/Behavioral: Negative for depression, suicidal ideas and memory loss.  Positive for stable anxiety and insomnia.  All other systems reviewed and are negative except as in HPI.    Exam:  Blood pressure 142/88, pulse 78, temperature 37.1 °C (98.8 °F), resp. rate 16, height 1.575 m (5' 2\"), weight 78 kg (172 lb), SpO2 96 %.  General:  Well nourished, well developed female in NAD  Head: Grossly normal.  Neck: Supple without JVD or bruit. Thyroid is not enlarged.  Pulmonary: Clear to ausculation. Normal effort. No rales, ronchi, or " wheezing.  Cardiovascular: Regular rate and rhythm without murmur.   Abdomen:  Soft, nontender, nondistended.  Extremities: No clubbing, cyanosis, or edema.  Skin: Intact with no obvious rashes or lesions.  Neuro: Grossly intact.  Psych: Alert and oriented x 3.  Mood and affect appropriate.    Medical decision-making and discussion: Koki is here today for follow-up.  She was given prescriptions for ambien and lorazepam and she will follow-up here as needed.          Assessment/Plan:  1. Insomnia, unspecified type  zolpidem (AMBIEN) 10 MG Tab   2. Anxiety  LORazepam (ATIVAN) 1 MG Tab   3. ARMEN (generalized anxiety disorder)     4. Essential hypertension     5. Hypothyroidism, unspecified type         Return if symptoms worsen or fail to improve.    Please note that this dictation was created using voice recognition software. I have made every reasonable attempt to correct obvious errors, but I expect that there are errors of grammar and possibly content that I did not discover before finalizing the note.

## 2018-07-02 ENCOUNTER — SLEEP CENTER VISIT (OUTPATIENT)
Dept: SLEEP MEDICINE | Facility: MEDICAL CENTER | Age: 58
End: 2018-07-02
Payer: COMMERCIAL

## 2018-07-02 VITALS
HEART RATE: 78 BPM | OXYGEN SATURATION: 96 % | WEIGHT: 173 LBS | BODY MASS INDEX: 31.83 KG/M2 | SYSTOLIC BLOOD PRESSURE: 106 MMHG | HEIGHT: 62 IN | RESPIRATION RATE: 16 BRPM | DIASTOLIC BLOOD PRESSURE: 66 MMHG

## 2018-07-02 DIAGNOSIS — I45.2 RIGHT BUNDLE BRANCH BLOCK (RBBB) WITH LEFT ANTERIOR FASCICULAR BLOCK: Chronic | ICD-10-CM

## 2018-07-02 DIAGNOSIS — G47.33 OBSTRUCTIVE SLEEP APNEA: Chronic | ICD-10-CM

## 2018-07-02 DIAGNOSIS — I10 ESSENTIAL HYPERTENSION: ICD-10-CM

## 2018-07-02 PROCEDURE — 99214 OFFICE O/P EST MOD 30 MIN: CPT | Performed by: NURSE PRACTITIONER

## 2018-07-02 NOTE — PROGRESS NOTES
Chief Complaint   Patient presents with   • Annual Exam       HPI:  Koki Townsend is a 57 y.o. year old female here today for annual f/u on EVI.  PSG split-night 1/11/2017 indicates severe sleep apnea with an AHI of 94.6 and a minimum oxygen saturation of 77%.  Patient currently uses BiPAP 23/18 cm H2O nightly.  Compliance card 6/1/2018 through 6/30/2018 indicates 73.3% compliance, average nightly use of 7 hours 42 minutes, minimal to moderate mask leaking and an overall AHI of 4.5.  She tolerates mask and pressure well.  She is a full facemask.  She denies morning headaches. Consistent daytime energy. She overall feels well rested on therapy.  She denies major changes in health over the last year.  She denies cardiac arrest or symptoms. BMI 31.    History of hypothyroidism, chronic kidney disease with abnormal liver associated enzymes, anxiety and hypertension.  Her brother also has sleep apnea and on CPAP therapy.    ROS: As per HPI and otherwise negative if not stated.    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     Social History Main Topics   • Smoking status: Never Smoker   • Smokeless tobacco: Never Used   • Alcohol use 0.0 oz/week      Comment:  "rarely   • Drug use: No   • Sexual activity: Not Currently     Partners: Male     Birth control/ protection: Post-Menopausal     Other Topics Concern   • Not on file     Social History Narrative   • No narrative on file       Allergies as of 07/02/2018 - Reviewed 07/02/2018   Allergen Reaction Noted   • Codeine Itching 12/18/2012        @Vital signs for this encounter:  Vitals:    07/02/18 1526   Height: 1.575 m (5' 2\")   Weight: 78.5 kg (173 lb)   Weight % change since last entry.: 0 %   BP: 106/66   Pulse: 78   BMI (Calculated): 31.64   Resp: 16   O2 sat % room air: 96 %       Current medications as of today   Current Outpatient Prescriptions   Medication Sig Dispense Refill   • zolpidem (AMBIEN) 10 MG Tab Take 0.5-1 Tabs by mouth at bedtime as needed for Sleep for up to 30 days. 30 Tab 5   • LORazepam (ATIVAN) 1 MG Tab Take 1 Tab by mouth 1 time daily as needed for Anxiety for up to 30 days. 30 Tab 2   • levothyroxine (SYNTHROID) 125 MCG Tab Take 1 Tab by mouth Every morning on an empty stomach. 30 Tab 1   • enalapril-hydrochlorthiazide (VASERETIC) 10-25 MG per tablet Take 0.5 Tabs by mouth every day. (Patient taking differently: Take 1 Tab by mouth every day.) 90 Tab 3   • escitalopram (LEXAPRO) 20 MG tablet Take 1 Tab by mouth every day. (Patient taking differently: Take 10 mg by mouth every day.) 90 Tab 3   • solifenacin (VESICARE) 5 MG tablet Take 2 Tabs by mouth every day. 180 Tab 3   • simvastatin (ZOCOR) 20 MG Tab Take 1 Tab by mouth every evening. 90 Tab 3   • Cholecalciferol (VITAMIN D3) 2000 UNIT Cap Take  by mouth every day.       No current facility-administered medications for this visit.          Physical Exam:   Gen:           Alert and oriented, No apparent distress. Mood and affect appropriate, normal interaction with examiner.  Eyes:          PERRL, EOM intact, sclere white, conjunctive moist.  Ears:          Not examined.  Hearing:     Grossly intact.  Nose:          Normal, no lesions or " deformities.  Dentition:    Good dentition.  Oropharynx:   Tongue normal, posterior pharynx without erythema or exudate.  Mallampati Classification: not examined.  Neck:        Supple, trachea midline, no masses.  Respiratory Effort: No intercostal retractions or use of accessory muscles.   Lung Auscultation:      Clear to auscultation bilaterally; no rales, rhonchi or wheezing.  CV:            Regular rate and rhythm. No murmurs, rubs or gallops.  Abd:           Not examined.   Lymphadenopathy: Not examined.  Gait and Station: Normal.  Digits and Nails: No clubbing, cyanosis, petechiae, or nodes.   Cranial Nerves: II-XII grossly intact.  Skin:        No rashes, lesions or ulcers noted.               Ext:           No cyanosis or edema.      Assessment:  1. Obstructive sleep apnea  DME MASK AND SUPPLIES   2. 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 Essential hypertension     4. BMI 31.0-31.9,adult         Immunizations:    Flu:9/2017  Pneumovax 23:due age 65  Prevnar 13:ue age 65    Plan:  1. Continue BIPAP nightly.  2. DME mask/supplies.  3. Discussed sleep hygiene.  4. Encouraged weight loss.  5. Follow up in 1 year with compliance card, sooner if needed.

## 2018-07-09 RX ORDER — LEVOTHYROXINE SODIUM 0.12 MG/1
125 TABLET ORAL
Qty: 90 TAB | Refills: 1 | Status: SHIPPED | OUTPATIENT
Start: 2018-07-09 | End: 2019-01-14 | Stop reason: SDUPTHER

## 2018-07-09 NOTE — TELEPHONE ENCOUNTER
Was the patient seen in the last year in this department? Yes     Does patient have an active prescription for medications requested? No     Received Request Via: Pharmacy      Pt met protocol?: Yes, OV last month, TSH checked 3/18 (HIGH)

## 2018-09-10 ENCOUNTER — APPOINTMENT (OUTPATIENT)
Dept: RADIOLOGY | Facility: MEDICAL CENTER | Age: 58
End: 2018-09-10
Attending: OBSTETRICS & GYNECOLOGY
Payer: COMMERCIAL

## 2018-09-13 ENCOUNTER — OFFICE VISIT (OUTPATIENT)
Dept: URGENT CARE | Facility: PHYSICIAN GROUP | Age: 58
End: 2018-09-13
Payer: COMMERCIAL

## 2018-09-13 VITALS
DIASTOLIC BLOOD PRESSURE: 78 MMHG | HEART RATE: 95 BPM | WEIGHT: 170 LBS | HEIGHT: 62 IN | RESPIRATION RATE: 16 BRPM | OXYGEN SATURATION: 95 % | TEMPERATURE: 96.9 F | SYSTOLIC BLOOD PRESSURE: 138 MMHG | BODY MASS INDEX: 31.28 KG/M2

## 2018-09-13 DIAGNOSIS — H91.90 ACQUIRED HEARING LOSS: ICD-10-CM

## 2018-09-13 PROCEDURE — 99203 OFFICE O/P NEW LOW 30 MIN: CPT | Performed by: INTERNAL MEDICINE

## 2018-09-13 ASSESSMENT — ENCOUNTER SYMPTOMS
SHORTNESS OF BREATH: 0
CHILLS: 0
FEVER: 0
COUGH: 0
SORE THROAT: 0
DIZZINESS: 0
VOMITING: 0
DIARRHEA: 0
BLOOD IN STOOL: 0
NAUSEA: 0
CONSTITUTIONAL NEGATIVE: 1
MYALGIAS: 0
EYES NEGATIVE: 1
PALPITATIONS: 0
HEADACHES: 0
WEIGHT LOSS: 0

## 2018-09-13 NOTE — PROGRESS NOTES
Koki Townsend is a 57 y.o. female who presents for Ear Fullness (Rt/Lt ear fullness, hard to hear )       Patient is a 57-year-old female presents today with complaint of difficulty hearing.  Patient states that this is been going on for some time now.  Patient states that she has not taken any medications or has been any environments to induce the type of loss.  She does state that her mother had an early onset of loss of hearing.  Patient denies any fever shakes chills.  Patient does not feel feel any fullness or pain in the affected ears no discharge.  No history of ear infections as a child.  Patient denies any nasal congestion.  No nausea vomiting diarrhea.  No ringing of the ears .no headache no other acute complaints.        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 She also has no past medical history of Depression or Hyperlipidemia.  MEDS:   Current Outpatient Prescriptions:   •  levothyroxine (SYNTHROID) 125 MCG Tab, Take 1 Tab by mouth Every morning on an empty stomach., Disp: 90 Tab, Rfl: 1  •  enalapril-hydrochlorthiazide (VASERETIC) 10-25 MG per tablet, Take 0.5 Tabs by mouth every day. (Patient taking differently: Take 1 Tab by mouth every day.), Disp: 90 Tab, Rfl: 3  •  escitalopram (LEXAPRO) 20 MG tablet, Take 1 Tab by mouth every day. (Patient taking differently: Take 10 mg by mouth every day.), Disp: 90 Tab, Rfl: 3  •  solifenacin (VESICARE) 5 MG tablet, Take 2 Tabs by mouth every day., Disp: 180 Tab, Rfl: 3  •  simvastatin (ZOCOR) 20 MG Tab, Take 1 Tab by mouth every evening., Disp: 90 Tab, Rfl: 3  •  Cholecalciferol (VITAMIN D3) 2000 UNIT Cap, Take  by mouth every day., Disp: , Rfl:   ALLERGIES:   Allergies   Allergen Reactions   • Codeine Itching     SURGHX:   Past Surgical History:   Procedure Laterality  "Date   • SINUSCOPE     • THYROIDECTOMY      Left lobe removed   • TONSILLECTOMY     • TUBAL LIGATION       SOCHX:  reports that she has never smoked. She has never used smokeless tobacco. She reports that she drinks alcohol. She reports that she does not use drugs.  FH: Family history was reviewed, no pertinent findings to report    Review of Systems   Constitutional: Negative.  Negative for chills, fever and weight loss.   HENT: Positive for hearing loss. Negative for ear discharge, ear pain, sore throat and tinnitus.    Eyes: Negative.    Respiratory: Negative for cough and shortness of breath.    Cardiovascular: Negative for chest pain, palpitations and leg swelling.   Gastrointestinal: Negative for blood in stool, diarrhea, nausea and vomiting.   Genitourinary: Negative for dysuria.   Musculoskeletal: Negative for myalgias.   Skin: Negative for rash.   Neurological: Negative for dizziness (negative headache) and headaches.   All other systems reviewed and are negative.    Allergies   Allergen Reactions   • Codeine Itching      Objective:   /78   Pulse 95   Temp 36.1 °C (96.9 °F)   Resp 16   Ht 1.575 m (5' 2\")   Wt 77.1 kg (170 lb)   SpO2 95%   BMI 31.09 kg/m²   Physical Exam   Constitutional: She is oriented to person, place, and time. She appears well-developed and well-nourished. She is active. No distress.   HENT:   Head: Normocephalic and atraumatic.   Right Ear: External ear normal.   Left Ear: External ear normal.   Mouth/Throat: Oropharynx is clear and moist. No oropharyngeal exudate.   Eyes: Pupils are equal, round, and reactive to light. Conjunctivae and EOM are normal. Right eye exhibits no discharge.   Neck: Normal range of motion. Neck supple. Carotid bruit is not present. No thyroid mass present.   Cardiovascular: Normal rate, regular rhythm, S1 normal, S2 normal and normal heart sounds.  Exam reveals no friction rub.    No murmur heard.  Pulmonary/Chest: Effort normal and breath sounds " normal. No respiratory distress. She has no wheezes.   Abdominal: There is no hepatosplenomegaly.   Musculoskeletal: Normal range of motion.        Cervical back: Normal.   Lymphadenopathy:        Head (right side): No submental, no submandibular and no occipital adenopathy present.        Head (left side): No submental, no submandibular and no occipital adenopathy present.   Neurological: She is alert and oriented to person, place, and time. She has normal strength.   Skin: Skin is warm and dry.   Psychiatric: She has a normal mood and affect. Her behavior is normal. Judgment and thought content normal.   Nursing note and vitals reviewed.        Assessment/Plan:   Assessment    1. Acquired hearing loss  Differential diagnosis, natural history, supportive care, and indications for immediate follow-up discussed.    We will refer the patient to an audiologist for further testing.  Patient follow-up with her primary care doctor may eventually need an ENT referral.

## 2018-09-20 ENCOUNTER — HOSPITAL ENCOUNTER (OUTPATIENT)
Dept: RADIOLOGY | Facility: MEDICAL CENTER | Age: 58
End: 2018-09-20
Attending: OBSTETRICS & GYNECOLOGY
Payer: COMMERCIAL

## 2018-09-20 DIAGNOSIS — Z12.31 SCREENING MAMMOGRAM, ENCOUNTER FOR: ICD-10-CM

## 2018-09-20 PROCEDURE — 77067 SCR MAMMO BI INCL CAD: CPT

## 2018-09-24 ENCOUNTER — IMMUNIZATION (OUTPATIENT)
Dept: OCCUPATIONAL MEDICINE | Facility: CLINIC | Age: 58
End: 2018-09-24

## 2018-09-24 DIAGNOSIS — Z23 NEED FOR VACCINATION: ICD-10-CM

## 2018-09-24 PROCEDURE — 90686 IIV4 VACC NO PRSV 0.5 ML IM: CPT | Performed by: PREVENTIVE MEDICINE

## 2018-09-25 RX ORDER — SOLIFENACIN SUCCINATE 5 MG/1
TABLET, FILM COATED ORAL
Qty: 180 TAB | Refills: 1 | Status: SHIPPED | OUTPATIENT
Start: 2018-09-25 | End: 2018-11-27

## 2018-09-25 NOTE — TELEPHONE ENCOUNTER
Was the patient seen in the last year in this department? Yes    Does patient have an active prescription for medications requested? No     Received Request Via: Pharmacy      Pt met protocol?: Yes, last ov 6/18/18  Last labs  6/20/18

## 2018-10-01 ENCOUNTER — PATIENT MESSAGE (OUTPATIENT)
Dept: MEDICAL GROUP | Facility: PHYSICIAN GROUP | Age: 58
End: 2018-10-01

## 2018-10-01 DIAGNOSIS — I10 ESSENTIAL HYPERTENSION: ICD-10-CM

## 2018-10-01 RX ORDER — ENALAPRIL MALEATE AND HYDROCHLOROTHIAZIDE 10; 25 MG/1; MG/1
1 TABLET ORAL DAILY
Qty: 90 TAB | Refills: 3 | Status: SHIPPED | OUTPATIENT
Start: 2018-10-01 | End: 2019-09-09 | Stop reason: SDUPTHER

## 2018-10-01 NOTE — PATIENT COMMUNICATION
Was the patient seen in the last year in this department? Yes    Does patient have an active prescription for medications requested? No     Received Request Via: Patient     Aras message sent to the patient.

## 2018-10-01 NOTE — TELEPHONE ENCOUNTER
----- Message from Koki Townsend sent at 10/1/2018  3:38 AM PDT -----  Regarding: Prescription Question  Contact: [phone]  Paulie,    I am out of my Enaparil 10-25, as we discussed since my BP was high I took a whole pill, not the half that I was taking all this time. When I went to refill since we didn't request a new prescription at the new dosage my insurance will not pay for it. Can I please have you send over a new prescription for Enapiral  10-25 1 whole pill daily. ASAP!!!  Thank you,  Koki Townsend

## 2018-11-27 ENCOUNTER — OFFICE VISIT (OUTPATIENT)
Dept: MEDICAL GROUP | Facility: PHYSICIAN GROUP | Age: 58
End: 2018-11-27
Payer: COMMERCIAL

## 2018-11-27 VITALS
SYSTOLIC BLOOD PRESSURE: 118 MMHG | OXYGEN SATURATION: 98 % | BODY MASS INDEX: 32.02 KG/M2 | HEIGHT: 62 IN | HEART RATE: 65 BPM | TEMPERATURE: 98.2 F | RESPIRATION RATE: 16 BRPM | WEIGHT: 174 LBS | DIASTOLIC BLOOD PRESSURE: 74 MMHG

## 2018-11-27 DIAGNOSIS — F41.1 GAD (GENERALIZED ANXIETY DISORDER): ICD-10-CM

## 2018-11-27 DIAGNOSIS — G47.00 INSOMNIA, UNSPECIFIED TYPE: ICD-10-CM

## 2018-11-27 DIAGNOSIS — I10 ESSENTIAL HYPERTENSION: ICD-10-CM

## 2018-11-27 DIAGNOSIS — F41.9 ANXIETY: ICD-10-CM

## 2018-11-27 PROCEDURE — 99214 OFFICE O/P EST MOD 30 MIN: CPT | Performed by: NURSE PRACTITIONER

## 2018-11-27 RX ORDER — ZOLPIDEM TARTRATE 10 MG/1
10 TABLET ORAL DAILY
COMMUNITY
Start: 2018-10-31 | End: 2018-11-27 | Stop reason: SDUPTHER

## 2018-11-27 RX ORDER — ZOLPIDEM TARTRATE 10 MG/1
10 TABLET ORAL DAILY
Qty: 90 TAB | Refills: 1 | Status: SHIPPED | OUTPATIENT
Start: 2018-11-27 | End: 2019-02-25

## 2018-11-27 RX ORDER — LORAZEPAM 1 MG/1
1 TABLET ORAL 2 TIMES DAILY PRN
Qty: 60 TAB | Refills: 5 | Status: SHIPPED | OUTPATIENT
Start: 2018-11-27 | End: 2019-07-30 | Stop reason: SDUPTHER

## 2018-11-27 RX ORDER — LORAZEPAM 1 MG/1
1 TABLET ORAL DAILY
COMMUNITY
Start: 2018-11-07 | End: 2018-11-27 | Stop reason: SDUPTHER

## 2018-11-27 NOTE — LETTER
[facility]  ZIGGY Zaidi  [address]  [place] [address]  Fax: [phone]   Authorization for Release/Disclosure of   Protected Health Information   Name: KOKI ARROYO : 1960 SSN: [id number]   Address: [address] [place] [address] [place] [address] Phone:    [phone] (home)    I authorize the entity listed below to release/disclose the PHI below to:   [facility]/ZIGGY Zaidi and ZIGGY Zaidi   Provider or Entity Name:  [facility]    Address   City, [facility], Zip  [address] [place] [address] Phone:  [phone]    Fax:     Reason for request: continuity of care   Information to be released:    [ x ] LAST COLONOSCOPY,  including any PATH REPORT and follow-up  [  ] LAST FIT/COLOGUARD RESULT [  ] LAST DEXA  [  ] LAST MAMMOGRAM  [  ] LAST PAP  [  ] LAST LABS [  ] RETINA EXAM REPORT  [  ] IMMUNIZATION RECORDS  [  ] Release all info        DATES OF SERVICE OR TIME PERIOD TO BE DISCLOSED: _____________  I understand and acknowledge that:  * This Authorization may be revoked at any time by you in writing, except if your health information has already been used or disclosed.  * Your health information that will be used or disclosed as a result of you signing this authorization could be re-disclosed by the recipient. If this occurs, your re-disclosed health information may no longer be protected by State or Federal laws.  * You may refuse to sign this Authorization. Your refusal will not affect your ability to obtain treatment.  * This Authorization becomes effective upon signing and will  on (date) __________.      If no date is indicated, this Authorization will  one (1) year from the signature date.    Name: Koki Arroyo    Signature:   Date:     2018       PLEASE FAX REQUESTED RECORDS BACK TO: [phone]

## 2018-11-28 ENCOUNTER — TELEPHONE (OUTPATIENT)
Dept: MEDICAL GROUP | Facility: PHYSICIAN GROUP | Age: 58
End: 2018-11-28

## 2018-11-28 NOTE — TELEPHONE ENCOUNTER
I confirmed with Paulie.  There is an error in the directions and the quantity.  Paulie wanted Take 1 tab by mouth 2 times a day as needed for anxiety for up to 30 days.  The quantity is the same.  A quantity of 60.  The pharmacy was contacted and the directions were changed.  Spoke to Radha.

## 2018-11-28 NOTE — TELEPHONE ENCOUNTER
"----- Message from Koki Townsend sent at 11/28/2018  7:23 AM PST -----  Regarding: Prescription Question  Contact: [phone]  Ramone Apodaca,    I wanted to mention that [facility] left a message for you about my Lorazepam, I guess it reads as \"take twice daily as needed for up to 60 days\" so the pharmacist stated she \"understands\" that to be 1 a day for 60 days. So not a 2 day supply. She needs you to call her back to correct the script so they can fix it in the computer....    I also checked my Epic, didn't see the message from you, so I probably don't have that capability!!     Have a Wonderful Wednesday!!!   Koki"

## 2018-11-28 NOTE — PROGRESS NOTES
Chief Complaint   Patient presents with   • Anxiety     Med refill/ATIVAN   • Difficulty Sleeping     Med refill/AMBIEN       HISTORY OF PRESENT ILLNESS: Patient is a 58 y.o. female established patient who presents today to discuss the following issues:    Essential hypertension  Chronic in nature.  Stable on meds.      Insomnia  Stable on ambien.  Would like refills.    ARMEN (generalized anxiety disorder)  Has been under more stress than usual.  Her son and his girlfriend are living with her, and they do not see eye to eye on many topics.  She has noticed increased anxiety. She would like to increase her lorazepam to twice a day as needed, just in case she needs to take it more than once.  She has also noted feelings of loneliness at times.  She was encouraged to seek out social opportunities.        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 60 Tab 5   • zolpidem (AMBIEN) 10 MG Tab Take 1 Tab by mouth every day for 90 days. 90 Tab 1   • enalapril-hydrochlorthiazide (VASERETIC) 10-25 MG per tablet Take 1 Tab by mouth every day. 90 Tab 3   • levothyroxine (SYNTHROID) 125 MCG Tab Take 1 Tab by mouth Every morning on an empty stomach. 90 Tab 1   • Cholecalciferol (VITAMIN D3) 2000 UNIT Cap Take  by mouth every day.       No current facility-administered medications for this visit.        Social History   Substance Use Topics   • Smoking status:  "Never Smoker   • Smokeless tobacco: Never Used   • Alcohol use 0.0 oz/week      Comment: rarely       Family Status   Relation Status   • Mo Alive   • Fa  at age 59        Lung Cancer   • Sis Alive   • Bro Alive   • Bro Alive   • Son Alive   • Marco A Alive   • Marco A Alive   • Neg Hx (Not Specified)     Family History   Problem Relation Age of Onset   • Heart Failure Mother    • Diabetes Mother    • Cancer Father         lung-abest   • Lung Cancer Father    • Sleep Apnea Brother    • Stroke Neg Hx        Review of Systems:   Constitutional: Negative for fever, chills, weight loss and malaise/fatigue.   HENT: Negative for ear pain, nosebleeds, congestion, sore throat and neck pain.    Eyes: Negative for blurred vision.   Respiratory: Negative for cough, sputum production, shortness of breath and wheezing.    Cardiovascular: Negative for chest pain, palpitations, orthopnea and leg swelling.   Gastrointestinal: Negative for heartburn, nausea, vomiting and abdominal pain.   Genitourinary: Negative for dysuria, urgency and frequency.   Musculoskeletal: Negative for myalgias, joint pain, and back pain.  Skin: Negative for rash and itching.   Neurological: Negative for dizziness, tingling, tremors, sensory change, focal weakness and headaches.   Endo/Heme/Allergies: Does not bruise/bleed easily.   Psychiatric/Behavioral: Positive for mild depression, anxiety, and insomnia.  Denies SI/HI.  All other systems reviewed and are negative except as in HPI.    Exam:  Blood pressure 118/74, pulse 65, temperature 36.8 °C (98.2 °F), resp. rate 16, height 1.575 m (5' 2\"), weight 78.9 kg (174 lb), SpO2 98 %, not currently breastfeeding.  General:  Well nourished, well developed female in NAD  Head: Grossly normal.  Neck: Supple without JVD or bruit. Thyroid is not enlarged.  Pulmonary: Clear to ausculation. Normal effort. No rales, ronchi, or wheezing.  Cardiovascular: Regular rate and rhythm without murmur.   Abdomen:  Soft, " nontender, nondistended.  Extremities: No clubbing, cyanosis, or edema.  Skin: Intact with no obvious rashes or lesions.  Neuro: Grossly intact.  Psych: Alert and oriented x 3.  Mood and affect appropriate.    Medical decision-making and discussion: Koki is here today for follow-up.  We discussed issues at length, and her ambien and lorazepam were refilled.  She will follow-up here as needed.          Assessment/Plan:  1. Anxiety  LORazepam (ATIVAN) 1 MG Tab   2. Insomnia, unspecified type  zolpidem (AMBIEN) 10 MG Tab   3. Essential hypertension     4. ARMEN (generalized anxiety disorder)         Return if symptoms worsen or fail to improve.    Please note that this dictation was created using voice recognition software. I have made every reasonable attempt to correct obvious errors, but I expect that there are errors of grammar and possibly content that I did not discover before finalizing the note.

## 2018-11-29 NOTE — ASSESSMENT & PLAN NOTE
Has been under more stress than usual.  Her son and his girlfriend are living with her, and they do not see eye to eye on many topics.  She has noticed increased anxiety. She would like to increase her lorazepam to twice a day as needed, just in case she needs to take it more than once.  She has also noted feelings of loneliness at times.  She was encouraged to seek out social opportunities.

## 2018-12-07 DIAGNOSIS — E03.9 HYPOTHYROIDISM, UNSPECIFIED TYPE: ICD-10-CM

## 2018-12-07 DIAGNOSIS — I10 ESSENTIAL HYPERTENSION: ICD-10-CM

## 2018-12-07 DIAGNOSIS — G47.33 OBSTRUCTIVE SLEEP APNEA: Chronic | ICD-10-CM

## 2018-12-07 DIAGNOSIS — N18.30 CKD (CHRONIC KIDNEY DISEASE) STAGE 3, GFR 30-59 ML/MIN (HCC): ICD-10-CM

## 2018-12-07 DIAGNOSIS — R73.01 IMPAIRED FASTING BLOOD SUGAR: ICD-10-CM

## 2019-01-14 RX ORDER — LEVOTHYROXINE SODIUM 0.12 MG/1
TABLET ORAL
Qty: 90 TAB | Refills: 0 | Status: SHIPPED | OUTPATIENT
Start: 2019-01-14 | End: 2019-04-17

## 2019-01-14 NOTE — TELEPHONE ENCOUNTER
Was the patient seen in the last year in this department? Yes    Does patient have an active prescription for medications requested? No     Received Request Via: Pharmacy      Pt met protocol?: Yes    LAST OV 11/27/2018      Lab Results  Component Value Date/Time   TSHULTRASEN 8.780 (H) 03/15/2018 0747

## 2019-01-17 ENCOUNTER — HOSPITAL ENCOUNTER (OUTPATIENT)
Dept: LAB | Facility: MEDICAL CENTER | Age: 59
End: 2019-01-17
Attending: FAMILY MEDICINE
Payer: COMMERCIAL

## 2019-01-17 DIAGNOSIS — R73.01 IMPAIRED FASTING BLOOD SUGAR: ICD-10-CM

## 2019-01-17 DIAGNOSIS — I10 ESSENTIAL HYPERTENSION: ICD-10-CM

## 2019-01-17 DIAGNOSIS — E03.9 HYPOTHYROIDISM, UNSPECIFIED TYPE: ICD-10-CM

## 2019-01-17 DIAGNOSIS — G47.33 OBSTRUCTIVE SLEEP APNEA: Chronic | ICD-10-CM

## 2019-01-17 DIAGNOSIS — N18.30 CKD (CHRONIC KIDNEY DISEASE) STAGE 3, GFR 30-59 ML/MIN (HCC): ICD-10-CM

## 2019-01-17 LAB
25(OH)D3 SERPL-MCNC: 30 NG/ML (ref 30–100)
ALBUMIN SERPL BCP-MCNC: 4.1 G/DL (ref 3.2–4.9)
ALBUMIN/GLOB SERPL: 1.4 G/DL
ALP SERPL-CCNC: 107 U/L (ref 30–99)
ALT SERPL-CCNC: 81 U/L (ref 2–50)
ANION GAP SERPL CALC-SCNC: 8 MMOL/L (ref 0–11.9)
AST SERPL-CCNC: 45 U/L (ref 12–45)
BASOPHILS # BLD AUTO: 0.8 % (ref 0–1.8)
BASOPHILS # BLD: 0.04 K/UL (ref 0–0.12)
BILIRUB SERPL-MCNC: 0.6 MG/DL (ref 0.1–1.5)
BUN SERPL-MCNC: 20 MG/DL (ref 8–22)
CALCIUM SERPL-MCNC: 9.9 MG/DL (ref 8.5–10.5)
CHLORIDE SERPL-SCNC: 104 MMOL/L (ref 96–112)
CHOLEST SERPL-MCNC: 175 MG/DL (ref 100–199)
CO2 SERPL-SCNC: 26 MMOL/L (ref 20–33)
CREAT SERPL-MCNC: 1.14 MG/DL (ref 0.5–1.4)
EOSINOPHIL # BLD AUTO: 0.16 K/UL (ref 0–0.51)
EOSINOPHIL NFR BLD: 3 % (ref 0–6.9)
ERYTHROCYTE [DISTWIDTH] IN BLOOD BY AUTOMATED COUNT: 39.4 FL (ref 35.9–50)
EST. AVERAGE GLUCOSE BLD GHB EST-MCNC: 126 MG/DL
FASTING STATUS PATIENT QL REPORTED: NORMAL
GLOBULIN SER CALC-MCNC: 3 G/DL (ref 1.9–3.5)
GLUCOSE SERPL-MCNC: 119 MG/DL (ref 65–99)
HBA1C MFR BLD: 6 % (ref 0–5.6)
HCT VFR BLD AUTO: 41.7 % (ref 37–47)
HDLC SERPL-MCNC: 38 MG/DL
HGB BLD-MCNC: 13.1 G/DL (ref 12–16)
IMM GRANULOCYTES # BLD AUTO: 0.01 K/UL (ref 0–0.11)
IMM GRANULOCYTES NFR BLD AUTO: 0.2 % (ref 0–0.9)
LDLC SERPL CALC-MCNC: 105 MG/DL
LYMPHOCYTES # BLD AUTO: 1.97 K/UL (ref 1–4.8)
LYMPHOCYTES NFR BLD: 37.5 % (ref 22–41)
MCH RBC QN AUTO: 27 PG (ref 27–33)
MCHC RBC AUTO-ENTMCNC: 31.4 G/DL (ref 33.6–35)
MCV RBC AUTO: 86 FL (ref 81.4–97.8)
MONOCYTES # BLD AUTO: 0.79 K/UL (ref 0–0.85)
MONOCYTES NFR BLD AUTO: 15 % (ref 0–13.4)
NEUTROPHILS # BLD AUTO: 2.28 K/UL (ref 2–7.15)
NEUTROPHILS NFR BLD: 43.5 % (ref 44–72)
NRBC # BLD AUTO: 0 K/UL
NRBC BLD-RTO: 0 /100 WBC
PLATELET # BLD AUTO: 241 K/UL (ref 164–446)
PMV BLD AUTO: 11.4 FL (ref 9–12.9)
POTASSIUM SERPL-SCNC: 3.4 MMOL/L (ref 3.6–5.5)
PROT SERPL-MCNC: 7.1 G/DL (ref 6–8.2)
RBC # BLD AUTO: 4.85 M/UL (ref 4.2–5.4)
SODIUM SERPL-SCNC: 138 MMOL/L (ref 135–145)
T4 FREE SERPL-MCNC: 1.1 NG/DL (ref 0.53–1.43)
TRIGL SERPL-MCNC: 158 MG/DL (ref 0–149)
TSH SERPL DL<=0.005 MIU/L-ACNC: 0.18 UIU/ML (ref 0.38–5.33)
WBC # BLD AUTO: 5.3 K/UL (ref 4.8–10.8)

## 2019-01-17 PROCEDURE — 85025 COMPLETE CBC W/AUTO DIFF WBC: CPT

## 2019-01-17 PROCEDURE — 84439 ASSAY OF FREE THYROXINE: CPT

## 2019-01-17 PROCEDURE — 83036 HEMOGLOBIN GLYCOSYLATED A1C: CPT

## 2019-01-17 PROCEDURE — 36415 COLL VENOUS BLD VENIPUNCTURE: CPT

## 2019-01-17 PROCEDURE — 80061 LIPID PANEL: CPT

## 2019-01-17 PROCEDURE — 82306 VITAMIN D 25 HYDROXY: CPT

## 2019-01-17 PROCEDURE — 84443 ASSAY THYROID STIM HORMONE: CPT

## 2019-01-17 PROCEDURE — 80053 COMPREHEN METABOLIC PANEL: CPT

## 2019-01-28 ENCOUNTER — OFFICE VISIT (OUTPATIENT)
Dept: MEDICAL GROUP | Facility: PHYSICIAN GROUP | Age: 59
End: 2019-01-28
Payer: COMMERCIAL

## 2019-01-28 VITALS
HEART RATE: 74 BPM | BODY MASS INDEX: 32.02 KG/M2 | TEMPERATURE: 97.4 F | RESPIRATION RATE: 16 BRPM | HEIGHT: 62 IN | OXYGEN SATURATION: 93 % | DIASTOLIC BLOOD PRESSURE: 84 MMHG | WEIGHT: 174 LBS | SYSTOLIC BLOOD PRESSURE: 130 MMHG

## 2019-01-28 DIAGNOSIS — N18.30 CKD (CHRONIC KIDNEY DISEASE) STAGE 3, GFR 30-59 ML/MIN (HCC): ICD-10-CM

## 2019-01-28 DIAGNOSIS — I10 ESSENTIAL HYPERTENSION: ICD-10-CM

## 2019-01-28 DIAGNOSIS — R73.01 IMPAIRED FASTING BLOOD SUGAR: ICD-10-CM

## 2019-01-28 DIAGNOSIS — E03.9 HYPOTHYROIDISM, UNSPECIFIED TYPE: ICD-10-CM

## 2019-01-28 PROCEDURE — 99214 OFFICE O/P EST MOD 30 MIN: CPT | Performed by: NURSE PRACTITIONER

## 2019-01-28 RX ORDER — LORAZEPAM 1 MG/1
1 TABLET ORAL PRN
COMMUNITY
Start: 2019-01-22 | End: 2019-07-01 | Stop reason: SDUPTHER

## 2019-01-28 ASSESSMENT — PATIENT HEALTH QUESTIONNAIRE - PHQ9: CLINICAL INTERPRETATION OF PHQ2 SCORE: 0

## 2019-01-28 NOTE — LETTER
[facility]  ZIGGY Zaidi  [address]  [place] [address]  Fax: [phone]   Authorization for Release/Disclosure of   Protected Health Information   Name: KOKI ARROYO : 1960 SSN: [id number]   Address: [address] [place] [address] [place] [address] Phone:    [phone] (home)    I authorize the entity listed below to release/disclose the PHI below to:   [facility]/ZIGGY Zaidi and ZIGGY Zaidi   Provider or Entity Name:  [facility]   Address   City, [facility], Zip:               [address] [place] [address]   Phone:  [phone]      Fax:      [phone]        Reason for request: continuity of care   Information to be released:    [ X ] LAST COLONOSCOPY,  including any PATH REPORT and follow-up  [ X ] LAST FIT/COLOGUARD RESULT [  ] LAST DEXA  [  ] LAST MAMMOGRAM  [  ] LAST PAP  [  ] LAST LABS [  ] RETINA EXAM REPORT  [  ] IMMUNIZATION RECORDS  [  ] Release all info        DATES OF SERVICE OR TIME PERIOD TO BE DISCLOSED: _____________  I understand and acknowledge that:  * This Authorization may be revoked at any time by you in writing, except if your health information has already been used or disclosed.  * Your health information that will be used or disclosed as a result of you signing this authorization could be re-disclosed by the recipient. If this occurs, your re-disclosed health information may no longer be protected by State or Federal laws.  * You may refuse to sign this Authorization. Your refusal will not affect your ability to obtain treatment.  * This Authorization becomes effective upon signing and will  on (date) __________.      If no date is indicated, this Authorization will  one (1) year from the signature date.    Name: Koki Arroyo   Date:     2019       PLEASE FAX REQUESTED RECORDS BACK TO: [phone]

## 2019-01-29 NOTE — PROGRESS NOTES
Chief Complaint   Patient presents with   • Results     Labs        HISTORY OF PRESENT ILLNESS: Patient is a 58 y.o. female established patient who presents today to discuss the following issues:    Hypothyroidism  Reviewed recent lab results.  TSH is slightly low.  Discussed decreasing her levothyroxine dose.  She states that she just refilled her medication and she doesn't want to have to pay for another strength.  She will alternate a whole tablet and a half tablet for now.    Impaired fasting blood sugar  Reviewed test results.  A1c is 6.0%.  She was encouraged to decrease her sugar and carb intake.    Essential hypertension  Chronic in nature.  Stable on meds.      CKD (chronic kidney disease) stage 3, GFR 30-59 ml/min  GFR stable.      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 90 Tab 1   • enalapril-hydrochlorthiazide (VASERETIC) 10-25 MG per tablet Take 1 Tab by mouth every day. 90 Tab 3   • Cholecalciferol (VITAMIN D3) 2000 UNIT Cap Take  by mouth every day.     • LORazepam (ATIVAN) 1 MG Tab Take 1 mg by mouth as needed.       No current facility-administered medications for this visit.        Social History   Substance Use Topics   • Smoking status: Never Smoker   • Smokeless tobacco: Never Used   • Alcohol use No       Family Status   Relation Status   •  "Mo Alive   • Fa  at age 59        Lung Cancer   • Sis Alive   • Bro Alive   • Bro Alive   • Son Alive   • Marco A Alive   • Marco A Alive   • Neg Hx (Not Specified)     Family History   Problem Relation Age of Onset   • Heart Failure Mother    • Diabetes Mother    • Cancer Father         lung-abest   • Lung Cancer Father    • Sleep Apnea Brother    • Stroke Neg Hx        Review of Systems:   Constitutional: Negative for fever, chills, weight loss and malaise/fatigue.   HENT: Negative for ear pain, nosebleeds, congestion, sore throat and neck pain.    Eyes: Negative for blurred vision.   Respiratory: Negative for cough, sputum production, shortness of breath and wheezing.    Cardiovascular: Negative for chest pain, palpitations, orthopnea and leg swelling.   Gastrointestinal: Negative for heartburn, nausea, vomiting and abdominal pain.   Genitourinary: Negative for dysuria, urgency and frequency.   Musculoskeletal: Negative for myalgias, joint pain, and back pain.  Skin: Negative for rash and itching.   Neurological: Negative for dizziness, tingling, tremors, sensory change, focal weakness and headaches.   Endo/Heme/Allergies: Does not bruise/bleed easily.   Psychiatric/Behavioral: Negative for depression, suicidal ideas and memory loss.  The patient is not nervous/anxious and does not have insomnia.    All other systems reviewed and are negative except as in HPI.    Exam:  Blood pressure 130/84, pulse 74, temperature 36.3 °C (97.4 °F), resp. rate 16, height 1.575 m (5' 2\"), weight 78.9 kg (174 lb), SpO2 93 %, not currently breastfeeding.  General:  Well nourished, well developed female in NAD  Head: Grossly normal.  Neck: Supple without JVD or bruit. Thyroid is not enlarged.  Pulmonary: Clear to ausculation. Normal effort. No rales, ronchi, or wheezing.  Cardiovascular: Regular rate and rhythm without murmur.   Abdomen:  Soft, nontender, nondistended.  Extremities: No clubbing, cyanosis, or edema.  Skin: Intact with " no obvious rashes or lesions.  Neuro: Grossly intact.  Psych: Alert and oriented x 3.  Mood and affect appropriate.    Medical decision-making and discussion: Koki is here today for follow-up.  Her lab results were reviewed and she will start alternating a whole and a half of her thyroid medication.  She will follow-up here as needed.          Assessment/Plan:  1. Hypothyroidism, unspecified type     2. Impaired fasting blood sugar     3. Essential hypertension     4. CKD (chronic kidney disease) stage 3, GFR 30-59 ml/min ([facility])         Return if symptoms worsen or fail to improve.    Please note that this dictation was created using voice recognition software. I have made every reasonable attempt to correct obvious errors, but I expect that there are errors of grammar and possibly content that I did not discover before finalizing the note.

## 2019-01-31 NOTE — ASSESSMENT & PLAN NOTE
Reviewed recent lab results.  TSH is slightly low.  Discussed decreasing her levothyroxine dose.  She states that she just refilled her medication and she doesn't want to have to pay for another strength.  She will alternate a whole tablet and a half tablet for now.

## 2019-03-25 ENCOUNTER — OFFICE VISIT (OUTPATIENT)
Dept: URGENT CARE | Facility: PHYSICIAN GROUP | Age: 59
End: 2019-03-25
Payer: COMMERCIAL

## 2019-03-25 VITALS
WEIGHT: 175 LBS | BODY MASS INDEX: 32.01 KG/M2 | HEART RATE: 88 BPM | OXYGEN SATURATION: 96 % | SYSTOLIC BLOOD PRESSURE: 144 MMHG | DIASTOLIC BLOOD PRESSURE: 88 MMHG | RESPIRATION RATE: 16 BRPM | TEMPERATURE: 98.4 F

## 2019-03-25 DIAGNOSIS — J22 ACUTE RESPIRATORY INFECTION: ICD-10-CM

## 2019-03-25 DIAGNOSIS — J06.9 VIRAL URI WITH COUGH: ICD-10-CM

## 2019-03-25 PROCEDURE — 99214 OFFICE O/P EST MOD 30 MIN: CPT | Performed by: PHYSICIAN ASSISTANT

## 2019-03-25 RX ORDER — BENZONATATE 100 MG/1
100 CAPSULE ORAL 3 TIMES DAILY PRN
Qty: 30 CAP | Refills: 0 | Status: SHIPPED | OUTPATIENT
Start: 2019-03-25 | End: 2019-04-17

## 2019-03-25 RX ORDER — ZOLPIDEM TARTRATE 10 MG/1
10 TABLET ORAL NIGHTLY PRN
COMMUNITY
End: 2019-05-21 | Stop reason: SDUPTHER

## 2019-03-25 ASSESSMENT — ENCOUNTER SYMPTOMS
FEVER: 0
COUGH: 1
SORE THROAT: 1
SHORTNESS OF BREATH: 0
MYALGIAS: 0
HEADACHES: 0
RHINORRHEA: 0

## 2019-03-25 ASSESSMENT — COPD QUESTIONNAIRES: COPD: 0

## 2019-03-25 NOTE — PROGRESS NOTES
Subjective:   Koki Townsend is a 58 y.o. female who presents for Cough (x 4 days)        History of heart murmur as a child.      Cough   This is a new problem. The current episode started in the past 7 days (4 days). The problem has been gradually worsening. The problem occurs hourly. The cough is productive of sputum. Associated symptoms include nasal congestion, postnasal drip and a sore throat. Pertinent negatives include no ear congestion, ear pain, fever, headaches, myalgias, rhinorrhea or shortness of breath. Nothing aggravates the symptoms. She has tried OTC cough suppressant for the symptoms. The treatment provided mild relief. There is no history of asthma, bronchitis, COPD, emphysema or pneumonia.     Review of Systems   Constitutional: Negative for fever.   HENT: Positive for postnasal drip and sore throat. Negative for ear pain and rhinorrhea.    Respiratory: Positive for cough. Negative for shortness of breath.    Musculoskeletal: Negative for myalgias.   Neurological: Negative for headaches.       PMH:  has a past medical history of Anxiety; Chickenpox; [place] measles; Hypertension; Hypothyroidism; Nasal drainage; 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; Sleep apnea; Thyroid disease; and Tonsillitis. She also has no past medical history of Depression or Hyperlipidemia.  MEDS:   Current Outpatient Prescriptions:   •  zolpidem (AMBIEN) 10 MG Tab, Take 10 mg by mouth at bedtime as needed for Sleep., Disp: , Rfl:   •  benzonatate (TESSALON) 100 MG Cap, Take 1 Cap by mouth 3 times a day as needed., Disp: 30 Cap, Rfl: 0  •  LORazepam (ATIVAN) 1 MG Tab, Take 1 mg by mouth as needed., Disp: , Rfl:   •  levothyroxine (SYNTHROID) 125 MCG Tab, TAKE ONE TABLET BY MOUTH EVERY MORNING ON AN EMPTY STOMACH, Disp: 90 Tab, Rfl: 0  •  enalapril-hydrochlorthiazide (VASERETIC) 10-25 MG per tablet, Take 1 Tab by mouth every day., Disp: 90 Tab, Rfl: 3  •  Cholecalciferol (VITAMIN D3) 2000 UNIT  Cap, Take  by mouth every day., Disp: , Rfl:   ALLERGIES:   Allergies   Allergen Reactions   • Codeine Itching     SURGHX:   Past Surgical History:   Procedure Laterality Date   • SINUSCOPE     • THYROIDECTOMY      Left lobe removed   • TONSILLECTOMY     • TUBAL LIGATION       SOCHX:  reports that she has never smoked. She has never used smokeless tobacco. She reports that she does not drink alcohol or use drugs.  FH: Family history was reviewed, no pertinent findings to report   Objective:   /88 (BP Location: Right arm, Patient Position: Sitting, BP Cuff Size: Large adult)   Pulse 88   Temp 36.9 °C (98.4 °F)   Resp 16   Wt 79.4 kg (175 lb)   SpO2 96%   BMI 32.01 kg/m²   Physical Exam   Constitutional: She appears well-developed and well-nourished.  Non-toxic appearance. No distress.   HENT:   Head: Normocephalic and atraumatic.   Right Ear: Tympanic membrane, external ear and ear canal normal.   Left Ear: Tympanic membrane, external ear and ear canal normal.   Nose: Mucosal edema and rhinorrhea present.   Mouth/Throat: Uvula is midline and mucous membranes are normal. Posterior oropharyngeal erythema present.   Moderate congestion.  Occasional wet cough.   Neck: Neck supple.   Cardiovascular: Normal rate, regular rhythm and normal heart sounds.  Exam reveals no gallop and no friction rub.    Possible faint systolic murmur heard best at the right sternal border.   Pulmonary/Chest: Effort normal and breath sounds normal. No respiratory distress. She has no decreased breath sounds. She has no wheezes. She has no rhonchi. She has no rales.   Neurological: She is alert.   Skin: Skin is warm and dry. Capillary refill takes less than 2 seconds.   Psychiatric: She has a normal mood and affect. Her speech is normal and behavior is normal. Judgment and thought content normal. Cognition and memory are normal.   Vitals reviewed.        Assessment/Plan:   1. Viral URI with cough  - benzonatate (TESSALON) 100 MG Cap;  Take 1 Cap by mouth 3 times a day as needed.  Dispense: 30 Cap; Refill: 0    Other orders  - zolpidem (AMBIEN) 10 MG Tab; Take 10 mg by mouth at bedtime as needed for Sleep.    1. VSS, no dyspnea, no SOB, and lungs CTA on PE.  Consistent with viral URI without lower respiratory involvement. Goals of care include symptomatic control and prevention of lower respiratory spread. Signs of lower respiratory involvement discussed with pt.  Pt instructed to RTC if any of these are observed.     Drink plenty of fluids and rest.  Flonase 1 squirt in each nostril, as instructed in clinic, once a day.  Use nasal saline TID to promote drainage.   Salt water gurgles to soothe sore throat.  Start OTC expectorant.  APAP for fever control, and ibuprofen for throat pain/headache relief prn.    Try to use sudafed sparingly in order to allow sinuses to drain.  Avoid the longer formulations and try to take only in the morning and/or at noon if needed.  If you fail to improve in 2-4 days or symptoms worsen/new symptoms develop, RTC for reevaluation.    2.  I informed patient that I believe that I heard a very faint systolic murmur on auscultation of her heart.  Patient states that she had a history of heart murmur as a child.  She sees a cardiologist annually for her hypertension and right bundle branch block.  I advised patient that I would like her to follow-up with her PCP regarding this incidental finding for reevaluation.    Differential diagnosis, natural history, supportive care, and indications for immediate follow-up discussed.

## 2019-03-26 ENCOUNTER — TELEPHONE (OUTPATIENT)
Dept: MEDICAL GROUP | Facility: PHYSICIAN GROUP | Age: 59
End: 2019-03-26

## 2019-03-26 DIAGNOSIS — E03.9 HYPOTHYROIDISM, UNSPECIFIED TYPE: ICD-10-CM

## 2019-03-26 RX ORDER — BENZONATATE 200 MG/1
CAPSULE ORAL
Qty: 30 CAP | Refills: 0 | OUTPATIENT
Start: 2019-03-26

## 2019-03-26 NOTE — TELEPHONE ENCOUNTER
VOICEMAIL  1. Caller Name: Koki                       Call Back Number: [phone] (home)      2. Message: Pt thought she was supposed to have Labs checked for thyroid. She was told she doesn't have any active labs and would like to have her thyroid checked,     3. Patient approves office to leave a detailed voicemail/MyChart message: N\A      LVMTCB    Please advise,

## 2019-04-04 ENCOUNTER — TELEPHONE (OUTPATIENT)
Dept: PULMONOLOGY | Facility: HOSPICE | Age: 59
End: 2019-04-04

## 2019-04-04 DIAGNOSIS — G47.33 OSA (OBSTRUCTIVE SLEEP APNEA): ICD-10-CM

## 2019-04-05 ENCOUNTER — HOSPITAL ENCOUNTER (OUTPATIENT)
Dept: LAB | Facility: MEDICAL CENTER | Age: 59
End: 2019-04-05
Attending: NURSE PRACTITIONER
Payer: COMMERCIAL

## 2019-04-05 DIAGNOSIS — E03.9 HYPOTHYROIDISM, UNSPECIFIED TYPE: ICD-10-CM

## 2019-04-05 LAB
T3 SERPL-MCNC: 121.2 NG/DL (ref 60–181)
T4 FREE SERPL-MCNC: 1.07 NG/DL (ref 0.53–1.43)
TSH SERPL DL<=0.005 MIU/L-ACNC: 0.59 UIU/ML (ref 0.38–5.33)

## 2019-04-05 PROCEDURE — 36415 COLL VENOUS BLD VENIPUNCTURE: CPT

## 2019-04-05 PROCEDURE — 84443 ASSAY THYROID STIM HORMONE: CPT

## 2019-04-05 PROCEDURE — 84480 ASSAY TRIIODOTHYRONINE (T3): CPT

## 2019-04-05 PROCEDURE — 84439 ASSAY OF FREE THYROXINE: CPT

## 2019-04-17 ENCOUNTER — OFFICE VISIT (OUTPATIENT)
Dept: MEDICAL GROUP | Facility: PHYSICIAN GROUP | Age: 59
End: 2019-04-17
Payer: COMMERCIAL

## 2019-04-17 VITALS
RESPIRATION RATE: 16 BRPM | DIASTOLIC BLOOD PRESSURE: 88 MMHG | WEIGHT: 177 LBS | HEIGHT: 62 IN | TEMPERATURE: 98.1 F | SYSTOLIC BLOOD PRESSURE: 138 MMHG | BODY MASS INDEX: 32.57 KG/M2 | OXYGEN SATURATION: 97 % | HEART RATE: 76 BPM

## 2019-04-17 DIAGNOSIS — E03.9 HYPOTHYROIDISM, UNSPECIFIED TYPE: ICD-10-CM

## 2019-04-17 PROCEDURE — 99214 OFFICE O/P EST MOD 30 MIN: CPT | Performed by: NURSE PRACTITIONER

## 2019-04-17 RX ORDER — LEVOTHYROXINE SODIUM 0.1 MG/1
100 TABLET ORAL
Qty: 90 TAB | Refills: 3 | Status: SHIPPED | OUTPATIENT
Start: 2019-04-17 | End: 2020-04-08 | Stop reason: SDUPTHER

## 2019-04-17 NOTE — LETTER
[employer] [facility]  ZIGGY Zaidi  [address]  [place] [address]  Fax: [phone]   Authorization for Release/Disclosure of   Protected Health Information   Name: KOKI ARROYO : 1960 SSN: [id number]   Address: [address] [place] [address] [place] [address] Phone:    [phone] (home)    I authorize the entity listed below to release/disclose the PHI below to:   [facility]/ZIGGY Zaidi and ZIGGY Zaidi   Provider or Entity Name:  [facility]   Address   City, [facility], Zip:               [address] [place] [address]   Phone:  [phone]      Fax:      [phone]        Reason for request: continuity of care   Information to be released:    [ X ] LAST COLONOSCOPY,  including any PATH REPORT and follow-up  [ X ] LAST FIT/COLOGUARD RESULT [  ] LAST DEXA  [  ] LAST MAMMOGRAM  [  ] LAST PAP  [  ] LAST LABS [  ] RETINA EXAM REPORT  [  ] IMMUNIZATION RECORDS  [  ] Release all info        DATES OF SERVICE OR TIME PERIOD TO BE DISCLOSED: _____________  I understand and acknowledge that:  * This Authorization may be revoked at any time by you in writing, except if your health information has already been used or disclosed.  * Your health information that will be used or disclosed as a result of you signing this authorization could be re-disclosed by the recipient. If this occurs, your re-disclosed health information may no longer be protected by State or Federal laws.  * You may refuse to sign this Authorization. Your refusal will not affect your ability to obtain treatment.  * This Authorization becomes effective upon signing and will  on (date) __________.      If no date is indicated, this Authorization will  one (1) year from the signature date.    Name: Koki Arroyo    Signature:   Date:     2019       PLEASE FAX REQUESTED RECORDS BACK TO: [phone]

## 2019-04-17 NOTE — PROGRESS NOTES
Chief Complaint   Patient presents with   • Hypothyroidism     Medication Management        HISTORY OF PRESENT ILLNESS: Patient is a 58 y.o. female established patient who presents today to discuss the following issues:    Hypothyroidism  Chronic in nature.  Stable on meds.  Reviewed recent lab results,  She had been alternating a whole and a half pill to use up the medication she had at home.  Discussed plan to proceed with levothyroxine 100 mcg daily.      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 90 Tab 3   • zolpidem (AMBIEN) 10 MG Tab Take 10 mg by mouth at bedtime as needed for Sleep.     • LORazepam (ATIVAN) 1 MG Tab Take 1 mg by mouth as needed.     • enalapril-hydrochlorthiazide (VASERETIC) 10-25 MG per tablet Take 1 Tab by mouth every day. 90 Tab 3   • Cholecalciferol (VITAMIN D3) 2000 UNIT Cap Take  by mouth every day.       No current facility-administered medications for this visit.        Social History   Substance Use Topics   • Smoking status: Never Smoker   • Smokeless tobacco: Never Used   • Alcohol use No       Family Status   Relation Status   • Mo Alive   • Fa  at age 59        Lung Cancer   • Sis Alive   • Bro Alive   • Bro Alive   • Son Alive   • Marco A Alive   • Marco A Alive   • Neg Hx (Not Specified)     Family History   Problem Relation Age of Onset   • Heart Failure Mother    • Diabetes Mother    • Cancer Father          "lung-abest   • Lung Cancer Father    • Sleep Apnea Brother    • Stroke Neg Hx        Review of Systems:   Constitutional: Negative for fever, chills, weight loss and malaise/fatigue.   HENT: Negative for ear pain, nosebleeds, congestion, sore throat and neck pain.    Eyes: Negative for blurred vision.   Respiratory: Negative for cough, sputum production, shortness of breath and wheezing.    Cardiovascular: Negative for chest pain, palpitations, orthopnea and leg swelling.   Gastrointestinal: Negative for heartburn, nausea, vomiting and abdominal pain.   Genitourinary: Negative for dysuria, urgency and frequency.   Musculoskeletal: Negative for myalgias, joint pain, and back pain.  Skin: Negative for rash and itching.   Neurological: Negative for dizziness, tingling, tremors, sensory change, focal weakness and headaches.   Endo/Heme/Allergies: Does not bruise/bleed easily.   Psychiatric/Behavioral: Negative for depression, suicidal ideas and memory loss.  The patient is not nervous/anxious and does not have insomnia.    All other systems reviewed and are negative except as in HPI.    Exam:  /88   Pulse 76   Temp 36.7 °C (98.1 °F)   Resp 16   Ht 1.575 m (5' 2\")   Wt 80.3 kg (177 lb)   SpO2 97%   General:  Well nourished, well developed female in NAD  Head: Grossly normal.  Neck: Supple without JVD or bruit. Thyroid is not enlarged.  Pulmonary: Clear to ausculation. Normal effort. No rales, ronchi, or wheezing.  Cardiovascular: Regular rate and rhythm without murmur.   Abdomen:  Soft, nontender, nondistended.  Extremities: No clubbing, cyanosis, or edema.  Skin: Intact with no obvious rashes or lesions.  Neuro: Grossly intact.  Psych: Alert and oriented x 3.  Mood and affect appropriate.    Medical decision-making and discussion: Koki is here today for follow-up.  We reviewed her recent lab results and levothyroxine 100 mcg was sent to her pharmacy.  She will follow-up here as " needed.          Assessment/Plan:  1. Hypothyroidism, unspecified type  levothyroxine (SYNTHROID) 100 MCG Tab       Return if symptoms worsen or fail to improve.    Please note that this dictation was created using voice recognition software. I have made every reasonable attempt to correct obvious errors, but I expect that there are errors of grammar and possibly content that I did not discover before finalizing the note.

## 2019-04-23 NOTE — ASSESSMENT & PLAN NOTE
Chronic in nature.  Stable on meds.  Reviewed recent lab results,  She had been alternating a whole and a half pill to use up the medication she had at home.  Discussed plan to proceed with levothyroxine 100 mcg daily.

## 2019-05-20 ENCOUNTER — SLEEP CENTER VISIT (OUTPATIENT)
Dept: SLEEP MEDICINE | Facility: MEDICAL CENTER | Age: 59
End: 2019-05-20
Payer: COMMERCIAL

## 2019-05-20 VITALS
WEIGHT: 179 LBS | HEIGHT: 62 IN | TEMPERATURE: 98.6 F | DIASTOLIC BLOOD PRESSURE: 80 MMHG | RESPIRATION RATE: 16 BRPM | BODY MASS INDEX: 32.94 KG/M2 | SYSTOLIC BLOOD PRESSURE: 148 MMHG | HEART RATE: 89 BPM | OXYGEN SATURATION: 90 %

## 2019-05-20 DIAGNOSIS — I10 ESSENTIAL HYPERTENSION: ICD-10-CM

## 2019-05-20 DIAGNOSIS — Z78.9 NONSMOKER: ICD-10-CM

## 2019-05-20 DIAGNOSIS — G47.33 OBSTRUCTIVE SLEEP APNEA: Chronic | ICD-10-CM

## 2019-05-20 DIAGNOSIS — F41.1 GAD (GENERALIZED ANXIETY DISORDER): ICD-10-CM

## 2019-05-20 PROCEDURE — 99214 OFFICE O/P EST MOD 30 MIN: CPT | Performed by: NURSE PRACTITIONER

## 2019-05-20 NOTE — PROGRESS NOTES
Chief Complaint   Patient presents with   • Apnea     BiPAP 23/18 cm H2O        HPI:  Koki Townsend is a 58 y.o. year old female here today for annual follow-up on EVI.  PSG split-night 1/11/2017 indicates severe sleep apnea with an AHI of 94.6 and a minimum oxygen saturation of 77%.  Patient currently uses BiPAP 23/18 cm H2O nightly.  Compliance card for 2019 through 5/19/2019 indicates 86.7% compliance, average nightly use of 7 hours 27 minutes, moderate mask leaking of 18 minutes and an overall AHI of 3.0.  She overall feels well rested on therapy but notes her new mask of 1 week to be leaking more and not as comfortable. She is requesting mask fit. She denies morning headaches. She notes consistent daytime energy.   She denies cardiac or respiratory symptoms. She notes anxiety to cause dyspnea at times.  BMI 32.  Patient is gained 6 pounds since last year. She was previously seen by dietician and Dr. Schwartz for weight loss. She notes this to cause anxiety for her.     History of hypothyroidism, chronic kidney disease with abnormal liver associated enzymes, anxiety and hypertension.   Her brother also has sleep apnea and on CPAP therapy.    ROS: As per HPI and otherwise negative if not stated.    Past Medical History:   Diagnosis Date   • Anxiety    • Chickenpox    • [place] measles    • Hypertension    • Hypothyroidism    • Nasal drainage    • 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    • Sleep apnea    • Thyroid disease    • Tonsillitis        Past Surgical History:   Procedure Laterality Date   • SINUSCOPE     • THYROIDECTOMY      Left lobe removed   • TONSILLECTOMY     • TUBAL LIGATION         Family History   Problem Relation Age of Onset   • Heart Failure Mother    • Diabetes Mother    • Cancer Father         lung-abest   • Lung Cancer Father    • Sleep Apnea Brother    • Stroke Neg Hx        Social History     Social History   • Marital status: Single     Spouse name: N/A   •  "Number of children: N/A   • Years of education: N/A     Occupational History   • Not on file.     Social History Main Topics   • Smoking status: Never Smoker   • Smokeless tobacco: Never Used   • Alcohol use No   • Drug use: No   • Sexual activity: Not Currently     Partners: Male     Birth control/ protection: Post-Menopausal     Other Topics Concern   • Not on file     Social History Narrative   • No narrative on file       Allergies as of 05/20/2019 - Reviewed 05/20/2019   Allergen Reaction Noted   • Codeine Itching 12/18/2012        @Vital signs for this encounter:  Vitals:    05/20/19 1531   Height: 1.575 m (5' 2\")   Weight: 81.2 kg (179 lb)   Weight % change since last entry.: 0 %   BP: 148/80   Pulse: 89   BMI (Calculated): 32.74   Resp: 16   Temp: 37 °C (98.6 °F)   TempSrc: Temporal   O2 sat % room air: (!) 90 %       Current medications as of today   Current Outpatient Prescriptions   Medication Sig Dispense Refill   • levothyroxine (SYNTHROID) 100 MCG Tab Take 1 Tab by mouth Every morning on an empty stomach. 90 Tab 3   • LORazepam (ATIVAN) 1 MG Tab Take 1 mg by mouth as needed.     • enalapril-hydrochlorthiazide (VASERETIC) 10-25 MG per tablet Take 1 Tab by mouth every day. 90 Tab 3   • Cholecalciferol (VITAMIN D3) 2000 UNIT Cap Take  by mouth every day.     • zolpidem (AMBIEN) 10 MG Tab Take 10 mg by mouth at bedtime as needed for Sleep.       No current facility-administered medications for this visit.          Physical Exam:   Gen:           Alert and oriented, No apparent distress. Mood and affect appropriate, normal interaction with examiner.  Eyes:          PERRL, EOM intact, sclere white, conjunctive moist.  Ears:          Not examined.   Hearing:     Grossly intact.  Nose:          Normal, no lesions or deformities.  Dentition:    Good dentition.  Oropharynx:   Tongue normal.  Mallampati Classification: not examined.  Neck:        Supple, trachea midline, no masses.  Respiratory Effort: No " intercostal retractions or use of accessory muscles.   Lung Auscultation:      Clear to auscultation bilaterally; no rales, rhonchi or wheezing.  CV:            Regular rate and rhythm. No murmurs, rubs or gallops.  Abd:           Not examined.   Lymphadenopathy: Not examined.  Gait and Station: Normal.  Digits and Nails: No clubbing, cyanosis, petechiae, or nodes.   Cranial Nerves: II-XII grossly intact.  Skin:        No rashes, lesions or ulcers noted.               Ext:           No cyanosis or edema.      Assessment:  1. Obstructive sleep apnea     2. Essential hypertension     3. Nonsmoker     4. ARMEN (generalized anxiety disorder)     5. BMI 32.0-32.9,adult  Height And Weight       Immunizations:    Flu:9/2018  Pneumovax 23:not due  Prevnar 13:not due    Plan:  1. EVI is clinically stable.  Will perform mask fit in office.  Continue BiPAP nightly.  DME mask/supplies.  2.  Discussed sleep hygiene.  3.  Encouraged weight loss.  4.  Follow-up with other healthcare providers as scheduled.  5.  Follow-up in 1 year with compliance card, sooner if needed.    Please note that this dictation was created using voice recognition software. I have made every reasonable attempt to correct obvious errors, but it is possible there are errors of grammar and possibly content that I did not discover before finalizing the note.

## 2019-05-21 DIAGNOSIS — G47.00 INSOMNIA, UNSPECIFIED TYPE: ICD-10-CM

## 2019-05-22 RX ORDER — ZOLPIDEM TARTRATE 10 MG/1
10 TABLET ORAL NIGHTLY PRN
Qty: 30 TAB | Refills: 5 | Status: SHIPPED
Start: 2019-05-22 | End: 2019-06-21

## 2019-07-01 DIAGNOSIS — F41.9 ANXIETY: ICD-10-CM

## 2019-07-02 RX ORDER — LORAZEPAM 1 MG/1
1 TABLET ORAL PRN
Qty: 30 TAB | Refills: 0 | Status: SHIPPED
Start: 2019-07-02 | End: 2019-09-30

## 2019-07-03 ENCOUNTER — HOSPITAL ENCOUNTER (OUTPATIENT)
Facility: MEDICAL CENTER | Age: 59
End: 2019-07-03
Payer: COMMERCIAL

## 2019-07-03 LAB
BDY FAT % MEASURED: 37.9 %
BP DIAS: 70 MMHG
BP SYS: 118 MMHG
CHOLEST SERPL-MCNC: 204 MG/DL (ref 100–199)
DIABETES HTDIA: NO
EVENT NAME HTEVT: NORMAL
FASTING STATUS PATIENT QL REPORTED: NORMAL
GLUCOSE SERPL-MCNC: 143 MG/DL (ref 65–99)
HDLC SERPL-MCNC: 37 MG/DL
HYPERTENSION HTHYP: NO
LDLC SERPL CALC-MCNC: 136 MG/DL
SCREENING LOC CITY HTCIT: NORMAL
SCREENING LOC STATE HTSTA: NORMAL
SCREENING LOCATION HTLOC: NORMAL
SMOKING HTSMO: NO
SUBSCRIBER ID HTSID: NORMAL
TRIGL SERPL-MCNC: 156 MG/DL (ref 0–149)

## 2019-07-03 PROCEDURE — 82947 ASSAY GLUCOSE BLOOD QUANT: CPT

## 2019-07-03 PROCEDURE — S5190 WELLNESS ASSESSMENT BY NONPH: HCPCS

## 2019-07-03 PROCEDURE — 80061 LIPID PANEL: CPT

## 2019-07-05 ENCOUNTER — TELEPHONE (OUTPATIENT)
Dept: MEDICAL GROUP | Facility: PHYSICIAN GROUP | Age: 59
End: 2019-07-05

## 2019-07-05 NOTE — TELEPHONE ENCOUNTER
----- Message from Marissa Coleman M.D. sent at 7/3/2019  4:33 PM PDT -----  Please call patient to ensure they received their results through My Chart.  The patient requires follow up office visit.

## 2019-07-30 ENCOUNTER — OFFICE VISIT (OUTPATIENT)
Dept: MEDICAL GROUP | Facility: PHYSICIAN GROUP | Age: 59
End: 2019-07-30
Payer: COMMERCIAL

## 2019-07-30 VITALS
DIASTOLIC BLOOD PRESSURE: 82 MMHG | SYSTOLIC BLOOD PRESSURE: 140 MMHG | HEIGHT: 62 IN | HEART RATE: 71 BPM | WEIGHT: 179 LBS | BODY MASS INDEX: 32.94 KG/M2 | TEMPERATURE: 97.8 F | OXYGEN SATURATION: 95 % | RESPIRATION RATE: 16 BRPM

## 2019-07-30 DIAGNOSIS — R73.09 INCREASED GLUCOSE LEVEL: ICD-10-CM

## 2019-07-30 DIAGNOSIS — F41.9 ANXIETY: ICD-10-CM

## 2019-07-30 DIAGNOSIS — R73.01 IMPAIRED FASTING BLOOD SUGAR: ICD-10-CM

## 2019-07-30 PROCEDURE — 99214 OFFICE O/P EST MOD 30 MIN: CPT | Performed by: NURSE PRACTITIONER

## 2019-07-30 RX ORDER — AMOXICILLIN 500 MG/1
CAPSULE ORAL
COMMUNITY
Start: 2019-07-09 | End: 2019-07-30

## 2019-07-30 RX ORDER — ZOLPIDEM TARTRATE 10 MG/1
TABLET ORAL
COMMUNITY
Start: 2019-07-01 | End: 2019-12-02 | Stop reason: SDUPTHER

## 2019-07-30 RX ORDER — PHENTERMINE HYDROCHLORIDE 37.5 MG/1
37.5 TABLET ORAL
Qty: 30 TAB | Refills: 0 | Status: SHIPPED | OUTPATIENT
Start: 2019-07-30 | End: 2019-08-26 | Stop reason: SDUPTHER

## 2019-07-30 RX ORDER — HYDROCODONE BITARTRATE AND ACETAMINOPHEN 7.5; 325 MG/1; MG/1
TABLET ORAL
COMMUNITY
Start: 2019-06-27 | End: 2019-07-30

## 2019-07-30 RX ORDER — LORAZEPAM 1 MG/1
1 TABLET ORAL 2 TIMES DAILY PRN
Qty: 180 TAB | Refills: 0 | Status: SHIPPED | OUTPATIENT
Start: 2019-07-30 | End: 2019-10-20 | Stop reason: SDUPTHER

## 2019-07-30 NOTE — LETTER
[facility]  ZIGGY Zaidi  [address]  [place] [address]  Fax: [phone]   Authorization for Release/Disclosure of   Protected Health Information   Name: KOKI ARROYO : 1960 SSN: [id number]   Address: [address] [place] [address] [place] [address] Phone:    [phone] (home)    I authorize the entity listed below to release/disclose the PHI below to:   [facility]/ZIGGY Zaidi and ZIGGY Zaidi   Provider or Entity Name:  [facility]   Address   City, [facility], Zip:               [address] [place] [id number]   Phone:  [phone]      Fax:      [phone]        Reason for request: continuity of care   Information to be released:    [ X ] LAST COLONOSCOPY,  including any PATH REPORT and follow-up  [ X ] LAST FIT/COLOGUARD RESULT [  ] LAST DEXA  [  ] LAST MAMMOGRAM  [  ] LAST PAP  [  ] LAST LABS [  ] RETINA EXAM REPORT  [  ] IMMUNIZATION RECORDS  [  ] Release all info        DATES OF SERVICE OR TIME PERIOD TO BE DISCLOSED: _____________  I understand and acknowledge that:  * This Authorization may be revoked at any time by you in writing, except if your health information has already been used or disclosed.  * Your health information that will be used or disclosed as a result of you signing this authorization could be re-disclosed by the recipient. If this occurs, your re-disclosed health information may no longer be protected by State or Federal laws.  * You may refuse to sign this Authorization. Your refusal will not affect your ability to obtain treatment.  * This Authorization becomes effective upon signing and will  on (date) __________.      If no date is indicated, this Authorization will  one (1) year from the signature date.    Name: Koki Arroyo    Signature:continuity of care  4th request!    Date:     2019       PLEASE FAX REQUESTED RECORDS BACK TO: [phone]

## 2019-07-31 PROBLEM — F41.9 ANXIETY: Status: ACTIVE | Noted: 2019-07-31

## 2019-07-31 NOTE — PROGRESS NOTES
Chief Complaint   Patient presents with   • Results     Lab [facility]        HISTORY OF PRESENT ILLNESS: Patient is a 58 y.o. female established patient who presents today to discuss the following issues:    Impaired fasting blood sugar  Reviewed recent labs.  Glucose elevated.  A POCT A1c in the office today is 6.0%.  She will continue to monitor her intake of sugar and carbs.    Anxiety  Her last refill of Ativan was written incorrectly.   reviewed and she was given a corrected prescription.    BMI 32.0-32.9,adult  Is frustrated with weight gain.  Discussed options.  She would like to proceed with a trial of phentermine.  Understands risks and benefits.       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 30 Tab 0   • LORazepam (ATIVAN) 1 MG Tab Take 1 Tab by mouth 2 times a day as needed for Anxiety for up to 90 days. 180 Tab 0   • levothyroxine (SYNTHROID) 100 MCG Tab Take 1 Tab by mouth Every morning on an empty stomach. 90 Tab 3   • enalapril-hydrochlorthiazide (VASERETIC) 10-25 MG per tablet Take 1 Tab by mouth every day. 90 Tab 3   • Cholecalciferol (VITAMIN D3) 2000 UNIT Cap Take  by mouth every day.     • LORazepam (ATIVAN) 1 MG Tab Take 1 Tab by mouth as needed for up to 90 days. 30 Tab 0     No current facility-administered medications for this visit.  "       Social History     Tobacco Use   • Smoking status: Never Smoker   • Smokeless tobacco: Never Used   Substance Use Topics   • Alcohol use: No     Alcohol/week: 0.0 oz   • Drug use: Yes     Types: Marijuana     Comment: CBD       Family Status   Relation Status   • Mo Alive   • Fa  at age 59        Lung Cancer   • Sis Alive   • Bro Alive   • Bro Alive   • Son Alive   • Marco A Alive   • Marco A Alive   • Neg Hx (Not Specified)     Family History   Problem Relation Age of Onset   • Heart Failure Mother    • Diabetes Mother    • Cancer Father         lung-abest   • Lung Cancer Father    • Sleep Apnea Brother    • Stroke Neg Hx        Review of Systems:   Constitutional: Negative for fever, chills, weight loss and malaise/fatigue.   HENT: Negative for ear pain, nosebleeds, congestion, sore throat and neck pain.    Eyes: Negative for blurred vision.   Respiratory: Negative for cough, sputum production, shortness of breath and wheezing.    Cardiovascular: Negative for chest pain, palpitations, orthopnea and leg swelling.   Gastrointestinal: Negative for heartburn, nausea, vomiting and abdominal pain.   Genitourinary: Negative for dysuria, urgency and frequency.   Musculoskeletal: Negative for myalgias, joint pain, and back pain.  Skin: Negative for rash and itching.   Neurological: Negative for dizziness, tingling, tremors, sensory change, focal weakness and headaches.   Endo/Heme/Allergies: Does not bruise/bleed easily.   Psychiatric/Behavioral: Negative for depression, suicidal ideas and memory loss.  Positive for stable anxiety.  All other systems reviewed and are negative except as in HPI.    Exam:  /82   Pulse 71   Temp 36.6 °C (97.8 °F)   Resp 16   Ht 1.575 m (5' 2\")   Wt 81.2 kg (179 lb)   SpO2 95%   General:  Well nourished, well developed female in NAD  Head: Grossly normal.  Neck: Supple without JVD or bruit. Thyroid is not enlarged.  Pulmonary: Clear to ausculation. Normal effort. No rales, " ronchi, or wheezing.  Cardiovascular: Regular rate and rhythm without murmur.   Abdomen:  Soft, nontender, nondistended.  Extremities: No clubbing, cyanosis, or edema.  Skin: Intact with no obvious rashes or lesions.  Neuro: Grossly intact.  Psych: Alert and oriented x 3.  Mood and affect appropriate.    Medical decision-making and discussion: Koki is here today to discuss a few things.  A POCT A1c was done and she was given prescriptions for phentermine and ativan.  She will follow-up here as needed.          Assessment/Plan:  1. Increased glucose level  POCT  A1C   2. BMI 32.0-32.9,adult  phentermine (ADIPEX-P) 37.5 MG tablet   3. Anxiety  LORazepam (ATIVAN) 1 MG Tab   4. Impaired fasting blood sugar         Return if symptoms worsen or fail to improve.    Please note that this dictation was created using voice recognition software. I have made every reasonable attempt to correct obvious errors, but I expect that there are errors of grammar and possibly content that I did not discover before finalizing the note.

## 2019-07-31 NOTE — ASSESSMENT & PLAN NOTE
Her last refill of Ativan was written incorrectly.   reviewed and she was given a corrected prescription.

## 2019-07-31 NOTE — ASSESSMENT & PLAN NOTE
Reviewed recent labs.  Glucose elevated.  A POCT A1c in the office today is 6.0%.  She will continue to monitor her intake of sugar and carbs.

## 2019-08-01 NOTE — ASSESSMENT & PLAN NOTE
Is frustrated with weight gain.  Discussed options.  She would like to proceed with a trial of phentermine.  Understands risks and benefits.

## 2019-09-09 DIAGNOSIS — I10 ESSENTIAL HYPERTENSION: ICD-10-CM

## 2019-09-11 RX ORDER — ENALAPRIL MALEATE AND HYDROCHLOROTHIAZIDE 10; 25 MG/1; MG/1
TABLET ORAL
Qty: 90 TAB | Refills: 1 | Status: SHIPPED | OUTPATIENT
Start: 2019-09-11 | End: 2020-03-16

## 2019-09-11 NOTE — TELEPHONE ENCOUNTER
Was the patient seen in the last year in this department? Yes    Does patient have an active prescription for medications requested? No     Received Request Via: Pharmacy    Pt met protocol?: Yes     Last OV 07/30/19    BP Readings from Last 1 Encounters:   07/30/19 140/82

## 2019-09-11 NOTE — TELEPHONE ENCOUNTER
Refill X 6 months, sent to pharmacy.Pt. Seen in the last 6 months per protocol.   Lab Results   Component Value Date/Time    SODIUM 138 01/17/2019 06:22 AM    POTASSIUM 3.4 (L) 01/17/2019 06:22 AM    CHLORIDE 104 01/17/2019 06:22 AM    CO2 26 01/17/2019 06:22 AM    GLUCOSE 143 (H) 07/03/2019 07:15 AM    BUN 20 01/17/2019 06:22 AM    CREATININE 1.14 01/17/2019 06:22 AM

## 2019-09-23 ENCOUNTER — IMMUNIZATION (OUTPATIENT)
Dept: OCCUPATIONAL MEDICINE | Facility: CLINIC | Age: 59
End: 2019-09-23

## 2019-09-23 DIAGNOSIS — Z23 NEED FOR VACCINATION: ICD-10-CM

## 2019-09-23 PROCEDURE — 90686 IIV4 VACC NO PRSV 0.5 ML IM: CPT | Performed by: PREVENTIVE MEDICINE

## 2019-10-01 ENCOUNTER — PATIENT MESSAGE (OUTPATIENT)
Dept: SLEEP MEDICINE | Facility: MEDICAL CENTER | Age: 59
End: 2019-10-01

## 2019-10-01 DIAGNOSIS — G47.33 OSA (OBSTRUCTIVE SLEEP APNEA): ICD-10-CM

## 2019-10-01 NOTE — TELEPHONE ENCOUNTER
From: Koki Townsend  To: DES Mario  Sent: 10/1/2019 9:00 AM PDT  Subject: Non-Urgent Medical Question    Hello,  I’m still having a leakage with my mask, it makes me wake up to hit the button to a lower setting. I know we discussed leaving it in the high setting but that is when it is waking me up every night. Is there a way you can bring it down a notch and see if that helps as it interrupts my sleeping every night and I don’t think it is beneficial to me. Thank you, Koki Townsend

## 2019-10-01 NOTE — TELEPHONE ENCOUNTER
From: Koki Townsend  To: DES Mario  Sent: 10/1/2019 11:33 AM PDT  Subject: Non-Urgent Medical Question    Ok. So I have to bring machine in for you to lower pressure? I didn’t think I could do it. I don’t know how. Lol  Thank you  Koki   ----- Message -----  From: DES Mario  Sent: 10/1/2019 9:20 AM PDT  To: Koki Townsend  Subject: RE: Non-Urgent Medical Question  Ok, let's lower pressures and see how you feel. We should check your usage in 1 month to see if the apneas are still well controlled. Can you message me and let me know how it is going. Thank you.    ----- Message -----   From: Koki Townsend   Sent: 10/1/2019 9:00 AM PDT   To: DES Mario  Subject: Non-Urgent Medical Question    Hello,  I’m still having a leakage with my mask, it makes me wake up to hit the button to a lower setting. I know we discussed leaving it in the high setting but that is when it is waking me up every night. Is there a way you can bring it down a notch and see if that helps as it interrupts my sleeping every night and I don’t think it is beneficial to me. Thank you, Koki Townsend

## 2019-10-01 NOTE — TELEPHONE ENCOUNTER
From: Koki Townsend  To: Blanka Lamar, Med Ass't  Sent: 10/1/2019 11:34 AM PDT  Subject: RE:(No subject)    Ok so I bring it into home health care?  Koki   ----- Message -----  From: Blanka Lamar, Med Ass't  Sent: 10/1/2019 9:44 AM PDT  To: Koki Townsend  Subject: (No subject)  Order for pressure adjustment faxed to [facility]

## 2019-10-02 ENCOUNTER — PATIENT MESSAGE (OUTPATIENT)
Dept: SLEEP MEDICINE | Facility: MEDICAL CENTER | Age: 59
End: 2019-10-02

## 2019-10-02 NOTE — TELEPHONE ENCOUNTER
From: Koki Townsend  To: Blanka Lamar, Med Ass't  Sent: 10/2/2019 10:59 AM PDT  Subject: RE:(No subject)    Were you able to lower it wirelessly? I didn't use it last night.....    Koki  ----- Message -----  From: Blanka Lamar, Med Ass't  Sent: 10/1/2019 1:32 PM PDT  To: Koki Townsend  Subject: (No subject)  Yes you can take it to [facility] home care or Bring it into our [facility]

## 2019-10-07 ENCOUNTER — OFFICE VISIT (OUTPATIENT)
Dept: MEDICAL GROUP | Facility: PHYSICIAN GROUP | Age: 59
End: 2019-10-07
Payer: COMMERCIAL

## 2019-10-07 VITALS
SYSTOLIC BLOOD PRESSURE: 118 MMHG | RESPIRATION RATE: 16 BRPM | DIASTOLIC BLOOD PRESSURE: 72 MMHG | TEMPERATURE: 98.9 F | OXYGEN SATURATION: 95 % | BODY MASS INDEX: 31.83 KG/M2 | HEART RATE: 85 BPM | WEIGHT: 173 LBS | HEIGHT: 62 IN

## 2019-10-07 DIAGNOSIS — Z12.39 BREAST CANCER SCREENING: ICD-10-CM

## 2019-10-07 DIAGNOSIS — I10 ESSENTIAL HYPERTENSION: ICD-10-CM

## 2019-10-07 DIAGNOSIS — G47.00 INSOMNIA, UNSPECIFIED TYPE: ICD-10-CM

## 2019-10-07 PROCEDURE — 99214 OFFICE O/P EST MOD 30 MIN: CPT | Performed by: NURSE PRACTITIONER

## 2019-10-07 RX ORDER — PHENTERMINE HYDROCHLORIDE 37.5 MG/1
37.5 TABLET ORAL
Qty: 30 TAB | Refills: 2 | Status: SHIPPED | OUTPATIENT
Start: 2019-10-07 | End: 2019-11-06

## 2019-10-07 NOTE — PROGRESS NOTES
Chief Complaint   Patient presents with   • Medication Refill   • Hypertension     [facility]       HISTORY OF PRESENT ILLNESS: Patient is a 58 y.o. female established patient who presents today to discuss the following issues:    Breast cancer screening  Due for mammogram.    Insomnia  Chronic in nature.  Stable on Ambien.    BMI 32.0-32.9,adult  Patient is aware of BMI elevation.  Brief discussion of diet, exercise, and lifestyle modification.  Would like to continue with phentermine.   reviewed.    Essential hypertension  Chronic in nature.  Stable on meds.        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 30 Tab 2   • enalapril-hydrochlorthiazide (VASERETIC) 10-25 MG per tablet TAKE ONE TABLET BY MOUTH ONE TIME DAILY  90 Tab 1   • zolpidem (AMBIEN) 10 MG Tab      • LORazepam (ATIVAN) 1 MG Tab Take 1 Tab by mouth 2 times a day as needed for Anxiety for up to 90 days. 180 Tab 0   • levothyroxine (SYNTHROID) 100 MCG Tab Take 1 Tab by mouth Every morning on an empty stomach. 90 Tab 3   • Cholecalciferol (VITAMIN D3) 2000 UNIT Cap Take  by mouth every day.       No current facility-administered medications for this visit.        Social History     Tobacco Use   • Smoking status: Never Smoker   • Smokeless tobacco: Never Used   Substance Use Topics   •  "Alcohol use: No     Alcohol/week: 0.0 oz   • Drug use: Not Currently       Family Status   Relation Name Status   • Mo  Alive   • Fa   at age 59        Lung Cancer   • Sis  Alive   • Bro  Alive   • Bro  Alive   • Son  Alive   • Marco A  Alive   • Marco A  Alive   • Neg Hx  (Not Specified)     Family History   Problem Relation Age of Onset   • Heart Failure Mother    • Diabetes Mother    • Cancer Father         lung-abest   • Lung Cancer Father    • Sleep Apnea Brother    • Stroke Neg Hx        Review of Systems:   Constitutional: Negative for fever, chills, weight loss and malaise/fatigue.   HENT: Negative for ear pain, nosebleeds, congestion, sore throat and neck pain.    Eyes: Negative for blurred vision.   Respiratory: Negative for cough, sputum production, shortness of breath and wheezing.    Cardiovascular: Negative for chest pain, palpitations, orthopnea and leg swelling.   Gastrointestinal: Negative for heartburn, nausea, vomiting and abdominal pain.   Genitourinary: Negative for dysuria, urgency and frequency.   Musculoskeletal: Negative for myalgias, joint pain, and back pain.  Skin: Negative for rash and itching.   Neurological: Negative for dizziness, tingling, tremors, sensory change, focal weakness and headaches.   Endo/Heme/Allergies: Does not bruise/bleed easily.   Psychiatric/Behavioral: Negative for depression, suicidal ideas and memory loss.  Positive for stable insomnia and anxiety.  All other systems reviewed and are negative except as in HPI.    Exam:  Blood Pressure 118/72   Pulse 85   Temperature 37.2 °C (98.9 °F)   Respiration 16   Height 1.575 m (5' 2\")   Weight 78.5 kg (173 lb)   Oxygen Saturation 95%   General:  Well nourished, well developed female in NAD  Head: Grossly normal.  Neck: Supple without JVD or bruit. Thyroid is not enlarged.  Pulmonary: Clear to ausculation. Normal effort. No rales, ronchi, or wheezing.  Cardiovascular: Regular rate and rhythm without murmur.   Abdomen: "  Soft, nontender, nondistended.  Extremities: No clubbing, cyanosis, or edema.  Skin: Intact with no obvious rashes or lesions.  Neuro: Grossly intact.  Psych: Alert and oriented x 3.  Mood and affect appropriate.    Medical decision-making and discussion: Koki is here today for follow-up.  Her records were reviewed, a mammogram was ordered, and she was given a prescription for phentermine.  She will follow-up here as needed.          Assessment/Plan:  1. Breast cancer screening  MA-SCREENING MAMMO BILAT W/TOMOSYNTHESIS W/CAD   2. BMI 32.0-32.9,adult  phentermine (ADIPEX-P) 37.5 MG tablet   3. Insomnia, unspecified type     4. Essential hypertension         Return if symptoms worsen or fail to improve.    Please note that this dictation was created using voice recognition software. I have made every reasonable attempt to correct obvious errors, but I expect that there are errors of grammar and possibly content that I did not discover before finalizing the note.

## 2019-10-09 PROBLEM — Z12.39 BREAST CANCER SCREENING: Status: ACTIVE | Noted: 2019-10-09

## 2019-10-09 NOTE — ASSESSMENT & PLAN NOTE
Patient is aware of BMI elevation.  Brief discussion of diet, exercise, and lifestyle modification.  Would like to continue with phentermine.   reviewed.

## 2019-10-20 DIAGNOSIS — F41.9 ANXIETY: ICD-10-CM

## 2019-10-22 RX ORDER — LORAZEPAM 1 MG/1
TABLET ORAL
Qty: 180 TAB | Refills: 0 | Status: SHIPPED
Start: 2019-10-22 | End: 2020-01-06

## 2019-11-01 DIAGNOSIS — J06.9 VIRAL URI WITH COUGH: ICD-10-CM

## 2019-11-01 RX ORDER — BENZONATATE 100 MG/1
100 CAPSULE ORAL 3 TIMES DAILY PRN
Qty: 30 CAP | Refills: 0 | Status: SHIPPED
Start: 2019-11-01 | End: 2020-05-04

## 2019-11-01 NOTE — TELEPHONE ENCOUNTER
Was the patient seen in the last year in this department? Yes    Does patient have an active prescription for medications requested? No     Received Request Via: Pharmacy      Pt met protocol?: Yes, OV last month, med d/c'd on list

## 2019-11-04 ENCOUNTER — HOSPITAL ENCOUNTER (OUTPATIENT)
Dept: RADIOLOGY | Facility: MEDICAL CENTER | Age: 59
End: 2019-11-04
Attending: NURSE PRACTITIONER
Payer: COMMERCIAL

## 2019-11-04 DIAGNOSIS — Z12.39 BREAST CANCER SCREENING: ICD-10-CM

## 2019-11-04 PROCEDURE — 77063 BREAST TOMOSYNTHESIS BI: CPT

## 2019-11-13 ENCOUNTER — TELEPHONE (OUTPATIENT)
Dept: MEDICAL GROUP | Facility: PHYSICIAN GROUP | Age: 59
End: 2019-11-13

## 2019-11-13 DIAGNOSIS — H35.63 RETINAL HEMORRHAGE, BILATERAL: ICD-10-CM

## 2019-11-13 NOTE — TELEPHONE ENCOUNTER
----- Message from Koki Townsend sent at 11/13/2019  2:26 PM PST -----  Regarding: Non-Urgent Medical Question  Contact: [phone]  Hi there,  I just spoke with my [facility] doctor, they need a referral sent to them for my upcoming appointment next Friday. I guess  the past referral  is up.  Are you able to do it before then?  Thank you,  Koki

## 2019-11-19 ENCOUNTER — OFFICE VISIT (OUTPATIENT)
Dept: URGENT CARE | Facility: PHYSICIAN GROUP | Age: 59
End: 2019-11-19
Payer: COMMERCIAL

## 2019-11-19 VITALS
HEART RATE: 99 BPM | WEIGHT: 175 LBS | TEMPERATURE: 98.4 F | BODY MASS INDEX: 32.2 KG/M2 | SYSTOLIC BLOOD PRESSURE: 134 MMHG | RESPIRATION RATE: 18 BRPM | HEIGHT: 62 IN | DIASTOLIC BLOOD PRESSURE: 82 MMHG | OXYGEN SATURATION: 95 %

## 2019-11-19 DIAGNOSIS — J01.00 ACUTE MAXILLARY SINUSITIS, RECURRENCE NOT SPECIFIED: ICD-10-CM

## 2019-11-19 PROCEDURE — 99214 OFFICE O/P EST MOD 30 MIN: CPT | Performed by: FAMILY MEDICINE

## 2019-11-19 RX ORDER — AMOXICILLIN AND CLAVULANATE POTASSIUM 875; 125 MG/1; MG/1
1 TABLET, FILM COATED ORAL 2 TIMES DAILY
Qty: 14 TAB | Refills: 0 | Status: SHIPPED | OUTPATIENT
Start: 2019-11-19 | End: 2019-11-26

## 2019-11-19 NOTE — PROGRESS NOTES
CC:  cough        Cough  This is a new problem. The current episode started 1 mth ago. The problem has been unchanged. The problem occurs constantly. The cough is productive. Associated symptoms include : fatigue, headache, sinus congestion, nasal d/c,  Subj.  fever. Pertinent negatives include no    , nausea, vomiting, diarrhea, sweats, weight loss or wheezing. Nothing aggravates the symptoms.  Patient has tried nothing for the symptoms. There is no history of asthma.              Past Medical History:   Diagnosis Date   • Anxiety    • Chickenpox    • [place] measles    • Hypertension    • Hypothyroidism    • Nasal drainage    • 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    • Sleep apnea    • Thyroid disease    • Tonsillitis          Social History     Socioeconomic History   • Marital status: Single     Spouse name: Not on file   • Number of children: Not on file   • Years of education: Not on file   • Highest education level: Not on file   Occupational History   • Not on file   Social Needs   • Financial resource strain: Not on file   • Food insecurity:     Worry: Not on file     Inability: Not on file   • Transportation needs:     Medical: Not on file     Non-medical: Not on file   Tobacco Use   • Smoking status: Never Smoker   • Smokeless tobacco: Never Used   Substance and Sexual Activity   • Alcohol use: No     Alcohol/week: 0.0 oz   • Drug use: Not Currently   • Sexual activity: Not Currently     Partners: Male     Birth control/protection: Post-Menopausal   Lifestyle   • Physical activity:     Days per week: Not on file     Minutes per session: Not on file   • Stress: Not on file   Relationships   • Social connections:     Talks on phone: Not on file     Gets together: Not on file     Attends Judaism service: Not on file     Active member of club or organization: Not on file     Attends meetings of clubs or organizations: Not on file     Relationship status: Not on file   •  "Intimate partner violence:     Fear of current or ex partner: Not on file     Emotionally abused: Not on file     Physically abused: Not on file     Forced sexual activity: Not on file   Other Topics Concern   • Not on file   Social History Narrative   • Not on file                 Review of Systems   Constitutional: Negative for   chills and malaise/fatigue.   Eyes: Negative for vision changes, d/c.    Respiratory: + for cough and sputum production.    Cardiovascular: Negative for chest pain and palpitations.   Gastrointestinal: Negative for nausea, vomiting, abdominal pain, diarrhea and constipation.   Genitourinary: Negative for dysuria, urgency and frequency.   Skin: Negative for rash or  itching.   Neurological: Negative for dizziness and tingling.    Psychiatric/Behavioral: Negative for depression.   Hematologic/lymphatic - denies bruising or excessive bleeding  All other systems reviewed and are negative.                   Objective:     /82 (BP Location: Right arm, Patient Position: Sitting, BP Cuff Size: Adult)   Pulse 99   Temp 36.9 °C (98.4 °F) (Temporal)   Resp 18   Ht 1.575 m (5' 2\")   Wt 79.4 kg (175 lb)   SpO2 95%       Physical Exam   Constitutional: patient is oriented to person, place, and time. Patient appears well-developed and well-nourished. No distress.   HENT:   Head: Normocephalic and atraumatic.   Right Ear: External ear normal.   Left Ear: External ear normal.   Nose: Mucosal edema  present. + bilat sinus TTP        Mouth/Throat: Mucous membranes are normal. No oral lesions.  No posterior pharyngeal erythema.  No oropharyngeal exudate or posterior oropharyngeal edema.   Eyes: Conjunctivae and EOM are normal. Pupils are equal, round, and reactive to light. Right eye exhibits no discharge. Left eye exhibits no discharge. No scleral icterus.   Neck: Normal range of motion. Neck supple. No tracheal deviation present.   Cardiovascular: Normal rate, regular rhythm and normal heart " sounds.  Exam reveals no friction rub.    Pulmonary/Chest: Effort normal. No respiratory distress. Patient has no wheezes or rhonchi. Patient has no rales.    Musculoskeletal:  exhibits no edema.   Lymphadenopathy:     Patient has no cervical adenopathy.      Neurological: patient is alert and oriented to person, place, and time.   Skin: Skin is warm and dry. No rash noted. No erythema.   Psychiatric: patient  has a normal mood and affect.  behavior is normal.   Nursing note and vitals reviewed.       A/P:      1. Acute maxillary sinusitis, recurrence not specified     - amoxicillin-clavulanate (AUGMENTIN) 875-125 MG Tab; Take 1 Tab by mouth 2 times a day for 7 days.  Dispense: 14 Tab; Refill: 0    Follow up in one week if no improvement, sooner if symptoms worsen.

## 2019-12-02 DIAGNOSIS — G47.00 INSOMNIA, UNSPECIFIED TYPE: ICD-10-CM

## 2019-12-03 RX ORDER — ZOLPIDEM TARTRATE 10 MG/1
TABLET ORAL
Qty: 30 TAB | Refills: 4 | Status: SHIPPED
Start: 2019-12-03 | End: 2020-01-02

## 2020-01-01 DIAGNOSIS — F41.9 ANXIETY: ICD-10-CM

## 2020-01-06 RX ORDER — LORAZEPAM 1 MG/1
TABLET ORAL
Qty: 180 TAB | Refills: 0 | Status: SHIPPED
Start: 2020-01-06 | End: 2020-01-16 | Stop reason: SDUPTHER

## 2020-01-10 ENCOUNTER — PATIENT MESSAGE (OUTPATIENT)
Dept: MEDICAL GROUP | Facility: PHYSICIAN GROUP | Age: 60
End: 2020-01-10

## 2020-01-10 NOTE — PATIENT COMMUNICATION
Contacted pharmacy because pt wanted Rx filled early. Pharmacy declined early refill and recommends to ask questions to the pt as to why she is out of her medication early.

## 2020-01-10 NOTE — TELEPHONE ENCOUNTER
From: Koki Townsend  To: ZIGGY Zaidi  Sent: 1/10/2020 11:38 AM PST  Subject: Prescription Question    Just checking. Can I go to an UC for anxiety? I know this is how this all started. Koki

## 2020-01-10 NOTE — TELEPHONE ENCOUNTER
From: Koki Townsend  To: Yfn Le Ass't  Sent: 1/10/2020 1:18 PM PST  Subject: Prescription Question    I already have one with Paulie but not until next week. I’ve been emailing with Misty she was going to check with Paulie about sending in a prescription between now and then. Please see other emails as I don’t have to write it all again. Thank you  Koki  ----- Message -----  From: Yfn Le Ass't  Sent: 1/10/2020 1:12 PM PST  To: Koki Townsend  Subject: RE: Prescription Question  Cali Telles,    It might be better to schedule an appointment with your PCP because they can usually prescribe a medication for you or give you a referral for a specialist. If you would like to schedule an appt with ZIGGY Zaidi  Please call scheduling or call us at the office at [phone].    Thanks,  Rach GASTON    ----- Message -----   From: Koki Townsend   Sent: 1/10/2020 11:38 AM PST   To: ZIGGY Zaidi  Subject: Prescription Question    Just checking. Can I go to an UC for anxiety? I know this is how this all started. Koki

## 2020-01-16 ENCOUNTER — OFFICE VISIT (OUTPATIENT)
Dept: MEDICAL GROUP | Facility: PHYSICIAN GROUP | Age: 60
End: 2020-01-16
Payer: COMMERCIAL

## 2020-01-16 VITALS
HEART RATE: 70 BPM | OXYGEN SATURATION: 96 % | WEIGHT: 170 LBS | DIASTOLIC BLOOD PRESSURE: 70 MMHG | SYSTOLIC BLOOD PRESSURE: 100 MMHG | RESPIRATION RATE: 16 BRPM | HEIGHT: 62 IN | TEMPERATURE: 99 F | BODY MASS INDEX: 31.28 KG/M2

## 2020-01-16 DIAGNOSIS — F41.9 ANXIETY: ICD-10-CM

## 2020-01-16 PROCEDURE — 99214 OFFICE O/P EST MOD 30 MIN: CPT | Performed by: NURSE PRACTITIONER

## 2020-01-16 RX ORDER — PHENTERMINE HYDROCHLORIDE 37.5 MG/1
TABLET ORAL
COMMUNITY
Start: 2019-11-18 | End: 2020-02-04 | Stop reason: SDUPTHER

## 2020-01-16 RX ORDER — LORAZEPAM 1 MG/1
1 TABLET ORAL EVERY 8 HOURS PRN
Qty: 180 TAB | Refills: 0 | Status: SHIPPED | OUTPATIENT
Start: 2020-01-16 | End: 2020-03-16

## 2020-01-16 ASSESSMENT — PATIENT HEALTH QUESTIONNAIRE - PHQ9: CLINICAL INTERPRETATION OF PHQ2 SCORE: 0

## 2020-01-16 NOTE — PROGRESS NOTES
Chief Complaint   Patient presents with   • Medication Refill       HISTORY OF PRESENT ILLNESS: Patient is a 59 y.o. female established patient who presents today to discuss the following issues:    No problem-specific Assessment & Plan notes found for this encounter.      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 180 Tab 0   • enalapril-hydrochlorthiazide (VASERETIC) 10-25 MG per tablet TAKE ONE TABLET BY MOUTH ONE TIME DAILY  90 Tab 1   • levothyroxine (SYNTHROID) 100 MCG Tab Take 1 Tab by mouth Every morning on an empty stomach. 90 Tab 3   • Cholecalciferol (VITAMIN D3) 2000 UNIT Cap Take  by mouth every day.     • benzonatate (TESSALON) 100 MG Cap Take 1 Cap by mouth 3 times a day as needed. (Patient not taking: Reported on 2020) 30 Cap 0     No current facility-administered medications for this visit.        Social History     Tobacco Use   • Smoking status: Never Smoker   • Smokeless tobacco: Never Used   Substance Use Topics   • Alcohol use: No     Alcohol/week: 0.0 oz   • Drug use: Not Currently       Family Status   Relation Name Status   • Mo  Alive   • Fa   at age 59        Lung Cancer   • Sis  Alive   • Bro  Alive   • Bro  Alive   • Son  Alive   • Marco A  Alive   • Marco A  Alive   • Neg  "Hx  (Not Specified)     Family History   Problem Relation Age of Onset   • Heart Failure Mother    • Diabetes Mother    • Cancer Father         lung-abest   • Lung Cancer Father    • Sleep Apnea Brother    • Stroke Neg Hx        Review of Systems:   Constitutional: Negative for fever, chills, weight loss and malaise/fatigue.   HENT: Negative for ear pain, nosebleeds, congestion, sore throat and neck pain.    Eyes: Negative for blurred vision.   Respiratory: Negative for cough, sputum production, shortness of breath and wheezing.    Cardiovascular: Negative for chest pain, palpitations, orthopnea and leg swelling.   Gastrointestinal: Negative for heartburn, nausea, vomiting and abdominal pain.   Genitourinary: Negative for dysuria, urgency and frequency.   Musculoskeletal: Negative for myalgias, joint pain, and back pain.  Skin: Negative for rash and itching.   Neurological: Negative for dizziness, tingling, tremors, sensory change, focal weakness and headaches.   Endo/Heme/Allergies: Does not bruise/bleed easily.   Psychiatric/Behavioral: Negative for depression, suicidal ideas and memory loss.  Positive for stable anxiety.    All other systems reviewed and are negative except as in HPI.    Exam:  Blood Pressure 100/70   Pulse 70   Temperature 37.2 °C (99 °F)   Respiration 16   Height 1.575 m (5' 2\")   Weight 77.1 kg (170 lb)   Oxygen Saturation 96%   General:  Well nourished, well developed female in NAD  Head: Grossly normal.  Neck: Supple without JVD or bruit. Thyroid is not enlarged.  Pulmonary: Clear to ausculation. Normal effort. No rales, ronchi, or wheezing.  Cardiovascular: Regular rate and rhythm without murmur.   Abdomen:  Soft, nontender, nondistended.  Extremities: No clubbing, cyanosis, or edema.  Skin: Intact with no obvious rashes or lesions.  Neuro: Grossly intact.  Psych: Alert and oriented x 3.  Mood and affect appropriate.    Medical decision-making and discussion: Koki is here today for " follow-up.  Her records were reviewed, her lorazepam was refilled, and she will follow-up here as needed.          Assessment/Plan:  1. Anxiety  LORazepam (ATIVAN) 1 MG Tab       Return if symptoms worsen or fail to improve.    Please note that this dictation was created using voice recognition software. I have made every reasonable attempt to correct obvious errors, but I expect that there are errors of grammar and possibly content that I did not discover before finalizing the note.

## 2020-01-23 NOTE — ASSESSMENT & PLAN NOTE
Wound up taking extra ativan due to increased stress, and the pharmacy wouldn't fill it early.  Her last fill was in October, and they are holding a prescription that we faxed on 1-6-20.  I will change the sig so that she can fill it a few days early, but she understands that this does not mean that she should take it more than usual.  reviewed.

## 2020-02-04 RX ORDER — PHENTERMINE HYDROCHLORIDE 37.5 MG/1
37.5 TABLET ORAL EVERY MORNING
Qty: 30 TAB | Refills: 0 | Status: SHIPPED | OUTPATIENT
Start: 2020-02-04 | End: 2020-03-05

## 2020-02-04 NOTE — TELEPHONE ENCOUNTER
----- Message from Koki Townsend sent at 2/4/2020  6:58 AM PST -----  Regarding: Prescription Question  Contact: [phone]  Hi ladies,    I am now out of my Phentermine medication, do I need to see Paulie or can I just ask for a refill through you? As I wasn't about to ask [facility](lol) and it states that it isn't a refill that I can request on My Chart. I feel like I'm doing pretty good with it still. I've lost about 10 pounds, up and down with it, but have kept 5-6 pounds off. If I can get it refilled please send it to [facility] in [place].   Thanks have a great day!!!!

## 2020-03-16 DIAGNOSIS — I10 ESSENTIAL HYPERTENSION: ICD-10-CM

## 2020-03-16 RX ORDER — ENALAPRIL MALEATE AND HYDROCHLOROTHIAZIDE 10; 25 MG/1; MG/1
TABLET ORAL
Qty: 90 TAB | Refills: 0 | Status: SHIPPED | OUTPATIENT
Start: 2020-03-16 | End: 2020-06-15

## 2020-03-16 NOTE — TELEPHONE ENCOUNTER
Received request via: Patient    Was the patient seen in the last year in this department? Yes    Does the patient have an active prescription (recently filled or refills available) for medication(s) requested? Yes    NEEDS ASAP

## 2020-04-14 ENCOUNTER — TELEPHONE (OUTPATIENT)
Dept: MEDICAL GROUP | Facility: PHYSICIAN GROUP | Age: 60
End: 2020-04-14

## 2020-04-14 DIAGNOSIS — Z00.00 WELLNESS EXAMINATION: ICD-10-CM

## 2020-04-17 ENCOUNTER — HOSPITAL ENCOUNTER (OUTPATIENT)
Dept: LAB | Facility: MEDICAL CENTER | Age: 60
End: 2020-04-17
Attending: NURSE PRACTITIONER
Payer: COMMERCIAL

## 2020-04-17 DIAGNOSIS — Z00.00 WELLNESS EXAMINATION: ICD-10-CM

## 2020-04-17 DIAGNOSIS — E03.9 HYPOTHYROIDISM, UNSPECIFIED TYPE: ICD-10-CM

## 2020-04-17 LAB
ALBUMIN SERPL BCP-MCNC: 3.8 G/DL (ref 3.2–4.9)
ALBUMIN/GLOB SERPL: 1.2 G/DL
ALP SERPL-CCNC: 99 U/L (ref 30–99)
ALT SERPL-CCNC: 183 U/L (ref 2–50)
ANION GAP SERPL CALC-SCNC: 13 MMOL/L (ref 7–16)
AST SERPL-CCNC: 145 U/L (ref 12–45)
BASOPHILS # BLD AUTO: 0.6 % (ref 0–1.8)
BASOPHILS # BLD: 0.03 K/UL (ref 0–0.12)
BILIRUB SERPL-MCNC: 0.8 MG/DL (ref 0.1–1.5)
BUN SERPL-MCNC: 15 MG/DL (ref 8–22)
CALCIUM SERPL-MCNC: 9.5 MG/DL (ref 8.5–10.5)
CHLORIDE SERPL-SCNC: 101 MMOL/L (ref 96–112)
CHOLEST SERPL-MCNC: 235 MG/DL (ref 100–199)
CO2 SERPL-SCNC: 25 MMOL/L (ref 20–33)
CREAT SERPL-MCNC: 1.06 MG/DL (ref 0.5–1.4)
EOSINOPHIL # BLD AUTO: 0.1 K/UL (ref 0–0.51)
EOSINOPHIL NFR BLD: 2 % (ref 0–6.9)
ERYTHROCYTE [DISTWIDTH] IN BLOOD BY AUTOMATED COUNT: 41.3 FL (ref 35.9–50)
FASTING STATUS PATIENT QL REPORTED: NORMAL
GLOBULIN SER CALC-MCNC: 3.1 G/DL (ref 1.9–3.5)
GLUCOSE SERPL-MCNC: 134 MG/DL (ref 65–99)
HCT VFR BLD AUTO: 41.9 % (ref 37–47)
HDLC SERPL-MCNC: 42 MG/DL
HGB BLD-MCNC: 14.1 G/DL (ref 12–16)
IMM GRANULOCYTES # BLD AUTO: 0.02 K/UL (ref 0–0.11)
IMM GRANULOCYTES NFR BLD AUTO: 0.4 % (ref 0–0.9)
LDLC SERPL CALC-MCNC: 147 MG/DL
LYMPHOCYTES # BLD AUTO: 1.99 K/UL (ref 1–4.8)
LYMPHOCYTES NFR BLD: 39.5 % (ref 22–41)
MCH RBC QN AUTO: 29.3 PG (ref 27–33)
MCHC RBC AUTO-ENTMCNC: 33.7 G/DL (ref 33.6–35)
MCV RBC AUTO: 87.1 FL (ref 81.4–97.8)
MONOCYTES # BLD AUTO: 0.58 K/UL (ref 0–0.85)
MONOCYTES NFR BLD AUTO: 11.5 % (ref 0–13.4)
NEUTROPHILS # BLD AUTO: 2.32 K/UL (ref 2–7.15)
NEUTROPHILS NFR BLD: 46 % (ref 44–72)
NRBC # BLD AUTO: 0 K/UL
NRBC BLD-RTO: 0 /100 WBC
PLATELET # BLD AUTO: 224 K/UL (ref 164–446)
PMV BLD AUTO: 11.5 FL (ref 9–12.9)
POTASSIUM SERPL-SCNC: 3.6 MMOL/L (ref 3.6–5.5)
PROT SERPL-MCNC: 6.9 G/DL (ref 6–8.2)
RBC # BLD AUTO: 4.81 M/UL (ref 4.2–5.4)
SODIUM SERPL-SCNC: 139 MMOL/L (ref 135–145)
TRIGL SERPL-MCNC: 230 MG/DL (ref 0–149)
WBC # BLD AUTO: 5 K/UL (ref 4.8–10.8)

## 2020-04-17 PROCEDURE — 80061 LIPID PANEL: CPT

## 2020-04-17 PROCEDURE — 80053 COMPREHEN METABOLIC PANEL: CPT

## 2020-04-17 PROCEDURE — 82306 VITAMIN D 25 HYDROXY: CPT

## 2020-04-17 PROCEDURE — 36415 COLL VENOUS BLD VENIPUNCTURE: CPT

## 2020-04-17 PROCEDURE — 85025 COMPLETE CBC W/AUTO DIFF WBC: CPT

## 2020-04-17 PROCEDURE — 84443 ASSAY THYROID STIM HORMONE: CPT

## 2020-04-18 LAB
25(OH)D3 SERPL-MCNC: 31 NG/ML (ref 30–100)
TSH SERPL DL<=0.005 MIU/L-ACNC: 1.54 UIU/ML (ref 0.38–5.33)

## 2020-04-20 ENCOUNTER — TELEPHONE (OUTPATIENT)
Dept: HEALTH INFORMATION MANAGEMENT | Facility: OTHER | Age: 60
End: 2020-04-20

## 2020-04-20 NOTE — TELEPHONE ENCOUNTER
1. Caller Name:Koki Townsend   Call Back Number: [phone]  [facility] PCP or Specialty Provider: Yes     2.  Does patient have any active symptoms of respiratory illness (fever OR cough OR shortness of breath OR sore throat)? No.  C/o ha x 4 days    3.  Does patient have any comoribidities? None     4.  Has the patient traveled in the last 14 days OR had any known contact with someone who is suspected or confirmed to have COVID-19?  No.    5. Disposition: Advised to perform self care, monitor for worsening symptoms and to call back in 3 days if no improvement    Note routed to [facility] Provider: [facility] only.

## 2020-05-04 ENCOUNTER — TELEMEDICINE (OUTPATIENT)
Dept: MEDICAL GROUP | Facility: PHYSICIAN GROUP | Age: 60
End: 2020-05-04
Payer: COMMERCIAL

## 2020-05-04 VITALS — HEIGHT: 62 IN | WEIGHT: 175 LBS | BODY MASS INDEX: 32.2 KG/M2

## 2020-05-04 DIAGNOSIS — R79.89 ELEVATED LFTS: ICD-10-CM

## 2020-05-04 DIAGNOSIS — R73.01 IMPAIRED FASTING GLUCOSE: ICD-10-CM

## 2020-05-04 PROCEDURE — 99213 OFFICE O/P EST LOW 20 MIN: CPT | Mod: 95,CR | Performed by: NURSE PRACTITIONER

## 2020-05-04 RX ORDER — ZOLPIDEM TARTRATE 10 MG/1
10 TABLET ORAL NIGHTLY PRN
COMMUNITY
End: 2020-05-28

## 2020-05-04 RX ORDER — LORAZEPAM 1 MG/1
1 TABLET ORAL EVERY 8 HOURS PRN
COMMUNITY
End: 2020-09-24

## 2020-05-04 ASSESSMENT — FIBROSIS 4 INDEX: FIB4 SCORE: 2.82

## 2020-05-04 NOTE — PROGRESS NOTES
Chief Complaint   Patient presents with   • Telephone Visit     ZOOM VISIT/LAB FV/RX REFILLS.     This encounter was conducted via Zoom .   Verbal consent was obtained. Patient's identity was verified.      HISTORY OF PRESENT ILLNESS: Patient is a 59 y.o. female established patient who presents today to discuss the following issues:    Impaired fasting blood sugar  Will check an A1c in 2 months.    Elevated LFTs  Reviewed lab results.  LFTs are elevated quite a bit more than last year.  Denies an increase in alcohol intake or Tylenol intake.  Understands that lorazepam can cause elevated ALT/AST.  Will check hepatic functions in 2 months.       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     • zolpidem (AMBIEN) 10 MG Tab Take 10 mg by mouth at bedtime as needed for Sleep.     • levothyroxine (SYNTHROID) 100 MCG Tab Take 1 Tab by mouth Every morning on an empty stomach. 90 Tab 0   • enalapril-hydrochlorthiazide (VASERETIC) 10-25 MG per tablet TAKE ONE TABLET BY MOUTH BY MOUTH ONCE DAILY (Patient taking differently: Take 1 Tab by mouth every day.) 90 Tab 0   • Cholecalciferol (VITAMIN D3) 2000 UNIT Cap Take  by mouth every day.       No current facility-administered medications for this visit.        Social History     Tobacco Use   • Smoking  "status: Never Smoker   • Smokeless tobacco: Never Used   Substance Use Topics   • Alcohol use: No     Alcohol/week: 0.0 oz   • Drug use: Not Currently       Family Status   Relation Name Status   • Mo  Alive   • Fa   at age 59        Lung Cancer   • Sis  Alive   • Bro  Alive   • Bro  Alive   • Son  Alive   • Marco A  Alive   • Marco A  Alive   • Neg Hx  (Not Specified)     Family History   Problem Relation Age of Onset   • Heart Failure Mother    • Diabetes Mother    • Cancer Father         lung-abest   • Lung Cancer Father    • Sleep Apnea Brother    • Stroke Neg Hx        Review of Systems:   Constitutional: Negative for fever, chills, weight loss and malaise/fatigue.   HENT: Negative for ear pain, nosebleeds, congestion, sore throat and neck pain.    Eyes: Negative for blurred vision.   Respiratory: Negative for cough, sputum production, shortness of breath and wheezing.    Cardiovascular: Negative for chest pain, palpitations, orthopnea and leg swelling.   Gastrointestinal: Negative for heartburn, nausea, vomiting and abdominal pain.   Genitourinary: Negative for dysuria, urgency and frequency.   Musculoskeletal: Negative for myalgias, joint pain, and back pain.  Skin: Negative for rash and itching.   Neurological: Negative for dizziness, tingling, tremors, sensory change, focal weakness and headaches.   Endo/Heme/Allergies: Does not bruise/bleed easily.   Psychiatric/Behavioral: Negative for depression, suicidal ideas and memory loss.  The patient is not nervous/anxious and does not have insomnia.    All other systems reviewed and are negative except as in HPI.    Exam:  Height 1.575 m (5' 2\")   Weight 79.4 kg (175 lb)   General:  Well nourished, well developed female in NAD  Head: Grossly normal.  Neck: Supple   Pulmonary: Normal effort.  Extremities: No clubbing, cyanosis, or edema.  Skin: Intact with no obvious rashes or lesions.  Neuro: Grossly intact.  Psych: Alert and oriented x 3.  Mood and affect " appropriate.    Medical decision-making and discussion: Koki is here today to discuss a few things.  Her chart was reviewed and her test results were discussed at length          Assessment/Plan:  1. Elevated LFTs  HEPATIC FUNCTION PANEL   2. Impaired fasting blood sugar  HEMOGLOBIN A1C       Return if symptoms worsen or fail to improve.    Please note that this dictation was created using voice recognition software. I have made every reasonable attempt to correct obvious errors, but I expect that there are errors of grammar and possibly content that I did not discover before finalizing the note.

## 2020-05-05 NOTE — ASSESSMENT & PLAN NOTE
Reviewed lab results.  LFTs are elevated quite a bit more than last year.  Denies an increase in alcohol intake or Tylenol intake.  Understands that lorazepam can cause elevated ALT/AST.  Will check hepatic functions in 2 months.

## 2020-06-11 ENCOUNTER — SLEEP CENTER VISIT (OUTPATIENT)
Dept: SLEEP MEDICINE | Facility: MEDICAL CENTER | Age: 60
End: 2020-06-11
Payer: COMMERCIAL

## 2020-06-11 VITALS
SYSTOLIC BLOOD PRESSURE: 128 MMHG | HEIGHT: 62 IN | BODY MASS INDEX: 33.13 KG/M2 | WEIGHT: 180 LBS | OXYGEN SATURATION: 94 % | HEART RATE: 78 BPM | DIASTOLIC BLOOD PRESSURE: 72 MMHG

## 2020-06-11 DIAGNOSIS — I10 ESSENTIAL HYPERTENSION: ICD-10-CM

## 2020-06-11 DIAGNOSIS — G47.33 OBSTRUCTIVE SLEEP APNEA: Chronic | ICD-10-CM

## 2020-06-11 PROCEDURE — 99213 OFFICE O/P EST LOW 20 MIN: CPT | Performed by: NURSE PRACTITIONER

## 2020-06-11 ASSESSMENT — FIBROSIS 4 INDEX: FIB4 SCORE: 2.82

## 2020-06-11 NOTE — PROGRESS NOTES
Chief Complaint   Patient presents with   • Follow-Up     Apnea-Last seen 05/20/2019       HPI:      Mrs. Townsend is a 60 y/o female patient who is in today for annual EVI f/u. PMH includes RBBB, CKD, HTN, insomnia, hypothyroidism, ARMEN.     PSG split-night 1/11/2017 indicates severe sleep apnea with an AHI of 94.6 and a minimum oxygen saturation of 77%.     Compliance report was not available for download.  She is currently treated on BiPAP EPAP 23/18 cmH2O. Reports to be using them machine every night.  She is tolerating the pressure well, but is reporting frequent leaks from the mask.  This is causing her to wake up frequently throughout the night.  She has gone to [facility] to be fitted for multiple masks and was also fitted last year in office without much success.  She goes to bed between 9-9:30 pm and wakes up at 5:30 am.  Denies morning headache or snoring.  She denies any new health problems or medications.      ROS:  Constitutional: Denies fevers, chills, sweats, fatigue, weight loss  Eyes: Denies glasses, vision loss, pain, drainage, double vision  Ears/Nose/Mouth/Throat: Denies rhinitis, nasal congestion, ear ache, difficulty hearing, sore throat, persistent hoarseness, decayed teeth/toothache  Cardiovascular: Denies chest pain, tightness, palpitations, swelling in feet/legs, fainting, difficulty breathing when laying down  Respiratory: Denies shortness of breath, cough, sputum, wheezing, painful breathing, coughing up blood  GI: Denies heartburn, difficulty swallowing, nausea, vomiting, abdominal pain, diarrhea, constiptation  : Denies frequent urination, painful urination  Integumentary: Denies rashes, lumps or color changes  Neurological: Denies frequent headaches, dizziness, weakness  Sleep: Denies daytime sleepiness, loud snoring, stops breathing during sleep, waking up gasping for air, insomnia, morning headaches      Past Medical History:   Diagnosis Date   • Anxiety    • Chickenpox    •  Yor[place] measles    • Hypertension    • Hypothyroidism    • Nasal drainage    • 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    • Sleep apnea    • Thyroid disease    • Tonsillitis        Past Surgical History:   Procedure Laterality Date   • SINUSCOPE     • THYROIDECTOMY      Left lobe removed   • TONSILLECTOMY     • TUBAL LIGATION         Family History   Problem Relation Age of Onset   • Heart Failure Mother    • Diabetes Mother    • Cancer Father         lung-abest   • Lung Cancer Father    • Sleep Apnea Brother    • Stroke Neg Hx        Social History     Socioeconomic History   • Marital status: Single     Spouse name: Not on file   • Number of children: Not on file   • Years of education: Not on file   • Highest education level: Not on file   Occupational History   • Not on file   Social Needs   • Financial resource strain: Not on file   • Food insecurity     Worry: Not on file     Inability: Not on file   • Transportation needs     Medical: Not on file     Non-medical: Not on file   Tobacco Use   • Smoking status: Never Smoker   • Smokeless tobacco: Never Used   Substance and Sexual Activity   • Alcohol use: No     Alcohol/week: 0.0 oz   • Drug use: Not Currently   • Sexual activity: Not Currently     Partners: Male     Birth control/protection: Post-Menopausal   Lifestyle   • Physical activity     Days per week: Not on file     Minutes per session: Not on file   • Stress: Not on file   Relationships   • Social connections     Talks on phone: Not on file     Gets together: Not on file     Attends [employer] service: Not on file     Active member of club or organization: Not on file     Attends meetings of clubs or organizations: Not on file     Relationship status: Not on file   • Intimate partner violence     Fear of current or ex partner: Not on file     Emotionally abused: Not on file     Physically abused: Not on file     Forced sexual activity: Not on file   Other Topics Concern   •  Not on file   Social History Narrative   • Not on file       Allergies as of 06/11/2020 - Reviewed 06/11/2020   Allergen Reaction Noted   • Codeine Itching 12/18/2012        Vitals:  Vitals:    06/11/20 1411   BP: 128/72   Pulse: 78   SpO2: 94%         Current medications as of today   Current Outpatient Medications   Medication Sig Dispense Refill   • zolpidem (AMBIEN) 10 MG Tab TAKE ONE TABLET BY MOUTH AT BEDTIME AS NEEDED FOR SLEEP  30 Tab 3   • LORazepam (ATIVAN) 1 MG Tab Take 1 mg by mouth every 8 hours as needed for Anxiety.     • levothyroxine (SYNTHROID) 100 MCG Tab Take 1 Tab by mouth Every morning on an empty stomach. 90 Tab 0   • enalapril-hydrochlorthiazide (VASERETIC) 10-25 MG per tablet TAKE ONE TABLET BY MOUTH BY MOUTH ONCE DAILY (Patient taking differently: Take 1 Tab by mouth every day.) 90 Tab 0   • Cholecalciferol (VITAMIN D3) 2000 UNIT Cap Take  by mouth every day.       No current facility-administered medications for this visit.          Physical Exam:   Appearance: Well developed, well nourished, no acute distress  Eyes: PERRL, EOM intact, sclera white, conjunctiva moist  Ears: no lesions or deformities  Hearing: grossly intact  Nose: no lesions or deformities  Respiratory effort: no intercostal retractions or use of accessory muscles  Extremities: no cyanosis or edema  Abdomen: soft   Gait and Station: normal  Digits and nails: no clubbing, cyanosis, petechiae or nodes.  Cranial nerves: grossly intact  Skin: no visible rashes, lesions or ulcers noted  Orientation: Oriented to time, person and place  Mood and affect: mood and affect appropriate, normal interaction with examiner  Judgement: Intact    Assessment:  1. Obstructive sleep apnea     2. Essential hypertension     3. BMI 32.0-32.9,adult           Plan  Discussed the cardiovascular and neuropsychiatric risks of untreated EVI; including but not limited to: HTN, DM, MI, ASCVD, CVA, CHF, traffic accidents.     1. Compliance report was not  available for download. Continue BiPAP EPAP 23/18 cmH2O. Patient is compliant and benefiting from BiPAP therapy for management of EVI.   2. DME order for mask (Airfit F30 small or MOC) and supplies, and new chip was provided today. Continue to clean mask and supplies weekly.   3. Continue to stay active.   4. F/u in 1 year, sooner if needed    ADDENDUM: 7/21/20  Compliance report is available for review today which showed BiPAP at IPAP/EPAP 21/16 cmH2O, biflex 3, 96.7% compliance, 8 hrs 52 min use, AHI of 1.9. Patient may f/u in one year, sooner if needed based on recent compliance. Continue with above stated plan.         Soniya Guy A.P.R.ALEXIA.      This dictation was created using voice recognition software. The accuracy of the dictation is limited to the abilities of the software. I expect there may be some errors of grammar and possibly content.

## 2020-06-12 DIAGNOSIS — I10 ESSENTIAL HYPERTENSION: ICD-10-CM

## 2020-06-15 RX ORDER — ENALAPRIL MALEATE AND HYDROCHLOROTHIAZIDE 10; 25 MG/1; MG/1
TABLET ORAL
Qty: 90 TAB | Refills: 1 | Status: SHIPPED | OUTPATIENT
Start: 2020-06-15 | End: 2020-09-24 | Stop reason: SDUPTHER

## 2020-06-15 NOTE — TELEPHONE ENCOUNTER
Last seen by PCP 05/4/2020.   Last Blood Pressure reading was 128/72 on 6/11/2020    Lab Results   Component Value Date/Time    SODIUM 139 04/17/2020 09:11 AM    POTASSIUM 3.6 04/17/2020 09:11 AM    CHLORIDE 101 04/17/2020 09:11 AM    CO2 25 04/17/2020 09:11 AM    GLUCOSE 134 (H) 04/17/2020 09:11 AM    BUN 15 04/17/2020 09:11 AM    CREATININE 1.06 04/17/2020 09:11 AM       Will send 6 month(s) to the pharmacy.

## 2020-06-26 ENCOUNTER — HOSPITAL ENCOUNTER (OUTPATIENT)
Dept: LAB | Facility: MEDICAL CENTER | Age: 60
End: 2020-06-26
Attending: NURSE PRACTITIONER
Payer: COMMERCIAL

## 2020-06-26 DIAGNOSIS — R79.89 ELEVATED LFTS: ICD-10-CM

## 2020-06-26 DIAGNOSIS — R73.01 IMPAIRED FASTING GLUCOSE: ICD-10-CM

## 2020-06-26 LAB
ALBUMIN SERPL BCP-MCNC: 3.9 G/DL (ref 3.2–4.9)
ALP SERPL-CCNC: 100 U/L (ref 30–99)
ALT SERPL-CCNC: 121 U/L (ref 2–50)
AST SERPL-CCNC: 71 U/L (ref 12–45)
BILIRUB CONJ SERPL-MCNC: <0.2 MG/DL (ref 0.1–0.5)
BILIRUB INDIRECT SERPL-MCNC: ABNORMAL MG/DL (ref 0–1)
BILIRUB SERPL-MCNC: 0.6 MG/DL (ref 0.1–1.5)
EST. AVERAGE GLUCOSE BLD GHB EST-MCNC: 151 MG/DL
HBA1C MFR BLD: 6.9 % (ref 0–5.6)
PROT SERPL-MCNC: 6.9 G/DL (ref 6–8.2)

## 2020-06-26 PROCEDURE — 80076 HEPATIC FUNCTION PANEL: CPT

## 2020-06-26 PROCEDURE — 83036 HEMOGLOBIN GLYCOSYLATED A1C: CPT

## 2020-06-26 PROCEDURE — 36415 COLL VENOUS BLD VENIPUNCTURE: CPT

## 2020-06-30 ENCOUNTER — OFFICE VISIT (OUTPATIENT)
Dept: MEDICAL GROUP | Facility: PHYSICIAN GROUP | Age: 60
End: 2020-06-30
Payer: COMMERCIAL

## 2020-06-30 ENCOUNTER — HOSPITAL ENCOUNTER (OUTPATIENT)
Dept: RADIOLOGY | Facility: MEDICAL CENTER | Age: 60
End: 2020-06-30
Attending: NURSE PRACTITIONER
Payer: COMMERCIAL

## 2020-06-30 VITALS
RESPIRATION RATE: 16 BRPM | HEIGHT: 62 IN | TEMPERATURE: 98.4 F | DIASTOLIC BLOOD PRESSURE: 78 MMHG | HEART RATE: 86 BPM | WEIGHT: 179 LBS | SYSTOLIC BLOOD PRESSURE: 128 MMHG | BODY MASS INDEX: 32.94 KG/M2 | OXYGEN SATURATION: 95 %

## 2020-06-30 DIAGNOSIS — E11.9 TYPE 2 DIABETES MELLITUS WITHOUT COMPLICATION, WITHOUT LONG-TERM CURRENT USE OF INSULIN (HCC): ICD-10-CM

## 2020-06-30 DIAGNOSIS — M79.89 SOFT TISSUE MASS: ICD-10-CM

## 2020-06-30 DIAGNOSIS — E66.9 OBESITY (BMI 30-39.9): ICD-10-CM

## 2020-06-30 PROCEDURE — 99214 OFFICE O/P EST MOD 30 MIN: CPT | Performed by: NURSE PRACTITIONER

## 2020-06-30 PROCEDURE — 76705 ECHO EXAM OF ABDOMEN: CPT

## 2020-06-30 ASSESSMENT — FIBROSIS 4 INDEX: FIB4 SCORE: 1.7

## 2020-07-03 PROBLEM — E11.9 TYPE 2 DIABETES MELLITUS WITHOUT COMPLICATION, WITHOUT LONG-TERM CURRENT USE OF INSULIN (HCC): Status: ACTIVE | Noted: 2020-07-03

## 2020-07-03 PROBLEM — M79.89 SOFT TISSUE MASS: Status: ACTIVE | Noted: 2020-07-03

## 2020-07-03 NOTE — ASSESSMENT & PLAN NOTE
Reviewed her lab results and her A1c was 6.9%. Discussed options.  She will start metformin, and a referral will be sent to endocrinology.

## 2020-07-03 NOTE — PROGRESS NOTES
Chief Complaint   Patient presents with   • Lump     on back and back side R. Thigh, had one on the L. Thigh x 1-2 Mo       HISTORY OF PRESENT ILLNESS: Patient is a 59 y.o. female established patient who presents today to discuss the following issues:    Soft tissue mass  Recently noticed a lump on her back when her daughter was rubbing her back.  She states that it is slightly tender when it is pushed on.  Discussed options and she would like to proceed with an ultrasound to confirm that it is a lipoma.    Type 2 diabetes mellitus without complication, without long-term current use of insulin (Formerly [facility])  Reviewed her lab results and her A1c was 6.9%. Discussed options.  She will start metformin, and a referral will be sent to endocrinology.      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 60 Tab 3   • enalapril-hydrochlorthiazide (VASERETIC) 10-25 MG per tablet TAKE ONE TABLET BY MOUTH ONCE DAILY 90 Tab 1   • LORazepam (ATIVAN) 1 MG Tab Take 1 mg by mouth every 8 hours as needed for Anxiety.     • levothyroxine (SYNTHROID) 100 MCG Tab Take 1 Tab by mouth Every morning on an  empty stomach. 90 Tab 0   • Cholecalciferol (VITAMIN D3) 2000 UNIT Cap Take  by mouth every day.       No current facility-administered medications for this visit.        Social History     Tobacco Use   • Smoking status: Never Smoker   • Smokeless tobacco: Never Used   Substance Use Topics   • Alcohol use: No     Alcohol/week: 0.0 oz   • Drug use: Not Currently       Family Status   Relation Name Status   • Mo  Alive   • Fa   at age 59        Lung Cancer   • Sis  Alive   • Bro  Alive   • Bro  Alive   • Son  Alive   • Marco A  Alive   • Marco A  Alive   • Neg Hx  (Not Specified)     Family History   Problem Relation Age of Onset   • Heart Failure Mother    • Diabetes Mother    • Cancer Father         lung-abest   • Lung Cancer Father    • Sleep Apnea Brother    • Stroke Neg Hx        Review of Systems:   Constitutional: Negative for fever, chills, weight loss and malaise/fatigue.   HENT: Negative for ear pain, nosebleeds, congestion, sore throat and neck pain.    Eyes: Negative for blurred vision.   Respiratory: Negative for cough, sputum production, shortness of breath and wheezing.    Cardiovascular: Negative for chest pain, palpitations, orthopnea and leg swelling.   Gastrointestinal: Negative for heartburn, nausea, vomiting and abdominal pain.   Genitourinary: Negative for dysuria, urgency and frequency.   Musculoskeletal: Negative for myalgias, joint pain, and back pain.  Skin: Negative for rash and itching. Lump on right back.  Neurological: Negative for dizziness, tingling, tremors, sensory change, focal weakness and headaches.   Endo/Heme/Allergies: Does not bruise/bleed easily.   Psychiatric/Behavioral: Negative for depression, suicidal ideas and memory loss.  The patient is not nervous/anxious and does not have insomnia.    All other systems reviewed and are negative except as in HPI.    Exam:  Blood Pressure 128/78 (BP Location: Left arm, Patient Position: Sitting, BP Cuff Size: Adult)   Pulse 86    "Temperature 36.9 °C (98.4 °F) (Temporal)   Respiration 16   Height 1.575 m (5' 2\")   Weight 81.2 kg (179 lb)   Oxygen Saturation 95%   General:  Well nourished, well developed female in NAD  Head: Grossly normal.  Neck: Supple without JVD or bruit. Thyroid is not enlarged.  Pulmonary: Clear to ausculation. Normal effort. No rales, ronchi, or wheezing.  Cardiovascular: Regular rate and rhythm without murmur.   Abdomen:  Soft, nontender, nondistended.  Extremities: No clubbing, cyanosis, or edema.  Skin: Intact with no obvious rashes or lesions.  4 cm soft tissue mass on right back.  Neuro: Grossly intact.  Psych: Alert and oriented x 3.  Mood and affect appropriate.    Medical decision-making and discussion: Koki is here today to discuss a few things.  Her chart was reviewed, an ultrasound was ordered, metformin was sent to her pharmacy, and a referral to endocrinology.  She will follow-up here as needed.          Assessment/Plan:  1. Soft tissue mass  US-ABDOMEN LTD (SOFT TISSUE)   2. Type 2 diabetes mellitus without complication, without long-term current use of insulin (HCC)  REFERRAL TO ENDOCRINOLOGY    metFORMIN (GLUCOPHAGE) 850 MG Tab   3. Obesity (BMI 30-39.9)  Patient identified as having weight management issue.  Appropriate orders and counseling given.       Return if symptoms worsen or fail to improve.    Please note that this dictation was created using voice recognition software. I have made every reasonable attempt to correct obvious errors, but I expect that there are errors of grammar and possibly content that I did not discover before finalizing the note.  "

## 2020-07-03 NOTE — ASSESSMENT & PLAN NOTE
Recently noticed a lump on her back when her daughter was rubbing her back.  She states that it is slightly tender when it is pushed on.  Discussed options and she would like to proceed with an ultrasound to confirm that it is a lipoma.

## 2020-07-07 ENCOUNTER — PATIENT MESSAGE (OUTPATIENT)
Dept: MEDICAL GROUP | Facility: PHYSICIAN GROUP | Age: 60
End: 2020-07-07

## 2020-07-07 NOTE — TELEPHONE ENCOUNTER
From: Koki Townsend  To: DES Mi  Sent: 7/7/2020 2:42 PM PDT  Subject: Non-Urgent Medical Question    Thank you for your reply, I understand. I did make an appointment with Jamee Wolf, so maybe we shall run into each other.      Thank you,  Koki      ----- Message -----   From:DES Mi   Sent:7/7/2020 2:32 PM PDT   To:Koki Townsend   Subject:RE: Non-Urgent Medical Question    I would like to hold off as I should really work on taking some of Dr. Sawant's first.   DES Mi      ----- Message -----   From:Koki Townsend   Sent:7/7/2020 12:51 PM PDT   To:DES Mi   Subject:Non-Urgent Medical Question    Hello,    I was a patient of Paulie QUINTANILLA, I thought I would just write to see if [facility] is accepting new patients as I would like to do a meet and greet or set up, I guess it would be a New Patient appointment. Can you let me know if [facility] is seeing new patients.    Thank you,  Koki Townsend

## 2020-07-12 DIAGNOSIS — E03.9 HYPOTHYROIDISM, UNSPECIFIED TYPE: ICD-10-CM

## 2020-07-13 RX ORDER — LEVOTHYROXINE SODIUM 0.1 MG/1
TABLET ORAL
Qty: 90 TAB | Refills: 0 | Status: SHIPPED | OUTPATIENT
Start: 2020-07-13 | End: 2020-09-16 | Stop reason: SDUPTHER

## 2020-08-28 ENCOUNTER — APPOINTMENT (OUTPATIENT)
Dept: SLEEP MEDICINE | Facility: MEDICAL CENTER | Age: 60
End: 2020-08-28
Payer: COMMERCIAL

## 2020-09-04 ENCOUNTER — HOSPITAL ENCOUNTER (OUTPATIENT)
Dept: LAB | Facility: MEDICAL CENTER | Age: 60
End: 2020-09-04
Payer: COMMERCIAL

## 2020-09-04 LAB
BDY FAT % MEASURED: 38.1 %
BP DIAS: 68 MMHG
BP SYS: 131 MMHG
CHOLEST SERPL-MCNC: 182 MG/DL (ref 100–199)
DIABETES HTDIA: NO
EVENT NAME HTEVT: NORMAL
FASTING HTFAS: YES
GLUCOSE SERPL-MCNC: 97 MG/DL (ref 65–99)
HDLC SERPL-MCNC: 44 MG/DL
HYPERTENSION HTHYP: YES
LDLC SERPL CALC-MCNC: 114 MG/DL
SCREENING LOC CITY HTCIT: NORMAL
SCREENING LOC STATE HTSTA: NORMAL
SCREENING LOCATION HTLOC: NORMAL
SMOKING HTSMO: NO
SUBSCRIBER ID HTSID: NORMAL
TRIGL SERPL-MCNC: 121 MG/DL (ref 0–149)

## 2020-09-04 PROCEDURE — 82947 ASSAY GLUCOSE BLOOD QUANT: CPT

## 2020-09-04 PROCEDURE — S5190 WELLNESS ASSESSMENT BY NONPH: HCPCS

## 2020-09-04 PROCEDURE — 36415 COLL VENOUS BLD VENIPUNCTURE: CPT

## 2020-09-04 PROCEDURE — 80061 LIPID PANEL: CPT

## 2020-09-16 ENCOUNTER — OFFICE VISIT (OUTPATIENT)
Dept: ENDOCRINOLOGY | Facility: MEDICAL CENTER | Age: 60
End: 2020-09-16
Attending: INTERNAL MEDICINE
Payer: COMMERCIAL

## 2020-09-16 VITALS
BODY MASS INDEX: 31.14 KG/M2 | HEIGHT: 62 IN | HEART RATE: 94 BPM | OXYGEN SATURATION: 98 % | SYSTOLIC BLOOD PRESSURE: 124 MMHG | DIASTOLIC BLOOD PRESSURE: 80 MMHG | WEIGHT: 169.2 LBS

## 2020-09-16 DIAGNOSIS — I10 ESSENTIAL HYPERTENSION: ICD-10-CM

## 2020-09-16 DIAGNOSIS — E11.9 CONTROLLED TYPE 2 DIABETES MELLITUS WITHOUT COMPLICATION, WITHOUT LONG-TERM CURRENT USE OF INSULIN (HCC): ICD-10-CM

## 2020-09-16 DIAGNOSIS — E78.5 DYSLIPIDEMIA: ICD-10-CM

## 2020-09-16 DIAGNOSIS — E89.0 POSTOPERATIVE HYPOTHYROIDISM: ICD-10-CM

## 2020-09-16 LAB
HBA1C MFR BLD: 5.6 % (ref 0–5.6)
INT CON NEG: NEGATIVE
INT CON POS: POSITIVE

## 2020-09-16 PROCEDURE — 99212 OFFICE O/P EST SF 10 MIN: CPT | Performed by: INTERNAL MEDICINE

## 2020-09-16 PROCEDURE — 83036 HEMOGLOBIN GLYCOSYLATED A1C: CPT | Performed by: INTERNAL MEDICINE

## 2020-09-16 PROCEDURE — 99204 OFFICE O/P NEW MOD 45 MIN: CPT | Performed by: INTERNAL MEDICINE

## 2020-09-16 RX ORDER — ATORVASTATIN CALCIUM 20 MG/1
20 TABLET, FILM COATED ORAL DAILY
Qty: 90 TAB | Refills: 3 | Status: SHIPPED | OUTPATIENT
Start: 2020-09-16 | End: 2020-12-15

## 2020-09-16 RX ORDER — LEVOTHYROXINE SODIUM 0.1 MG/1
100 TABLET ORAL
Qty: 90 TAB | Refills: 3 | Status: SHIPPED | OUTPATIENT
Start: 2020-09-16 | End: 2020-12-15

## 2020-09-16 ASSESSMENT — FIBROSIS 4 INDEX: FIB4 SCORE: 1.7

## 2020-09-16 NOTE — PROGRESS NOTES
"Chief Complaint:  Consult requested by ZIGGY Zaidi for initial evaluation of Type 2 Diabetes Mellitus    HPI:   Koki Townsend is a 59 y.o. female with Type 2 Diabetes Mellitus diagnosed in 2020.  She denies hospitalizations for DKA in the past.    Her a1c was 6.9% on 6/2020 - her a1c improved to 5.9% on 9/16/2020 after starting Metformin 850mg bid and started exercising more and watching her carb intake    She has fatty liver disease as proven by her elevated liver enzymes and ultrasound from June 2020 but after diet and exercise and weight loss her liver enzymes have been trending down.    She has postsurgical hypothyroidism after left lobectomy by Dr. Hines over 20 years ago.  She had na US in 2009 with normal postsurgical changes and no dominant nodules in the right lobe.  She is taking Levothyroxine 100mcg daily for the past year with no recent dose changes. Her TSH was 1.5 on April 2020 and 0.590 on 4/2019     She does not monitors blood glucose          She denies hypoglycemic episodes  She  denies hypoglycemic unawareness. She denies episodes of severe hypoglycemia requiring third party assistance.  She  is not wearing a medical alert bracelet or necklace.  She does not a glucagon emergency kit.    She denies attending diabetes education classes.  Diet: \"healthy\" diet  in general.    Diabetes Complications   She  denies history of retinopathy.  She denies laser eye surgery. Last eye exam: She saw Dr. Berg  She denies history of peripheral sensory neuropathy.  She denies numbness, tingling in both feet.  She denies history of foot sores.   She denies history of kidney damage.  She is on ACE inhibitor or ARB.   She denies history of coronary artery disease.  She  denies history of stroke and denies TIA.  She denies history of PAD.  She reports history of hyperlipidemia.      ROS:     CONS:     No fever, no chills, no weight loss, no fatigue   EYES:      No diplopia, no blurry vision, no " redness of eyes, no swelling of eyelids   ENT:    No hearing loss, No ear pain, No sore throat, no dysphagia, no neck swelling   CV:     No chest pain, no palpitations, no claudication, no orthopnea, no PND   PULM:    No SOB, no cough, no hemoptysis, no wheezing    GI:   No nausea, no vomiting, no diarrhea, no constipation, no bloody stools   :  Passing urine well, no dysuria, no hematuria   ENDO:   No polyuria, no polydipsia, no heat intolerance, no cold intolerance   NEURO: No headaches, no dizziness, no convulsions, no tremors   MUSC:  No joint swellings, no arthralgias, no myalgias, no weakness   SKIN:   No rash, no ulcers, no dry skin   PSYCH:   No depression, no anxiety, no difficulty sleeping       Past Medical History:  Patient Active Problem List    Diagnosis Date Noted   • Type 2 diabetes mellitus without complication, without long-term current use of insulin (Formerly [facility]) 07/03/2020   • Soft tissue mass 07/03/2020   • Obesity (BMI 30-39.9) 06/30/2020   • Elevated LFTs 05/04/2020   • Breast cancer screening 10/09/2019   • Anxiety 07/31/2019   • ARMEN (generalized anxiety disorder) 01/04/2018   • Hypothyroidism 07/31/2017   • BMI 32.0-32.9,adult 04/21/2017   • Insomnia 02/28/2017   • Obstructive sleep apnea 01/25/2017   • 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    • Impaired fasting blood sugar 12/07/2015   • CKD (chronic kidney disease) stage 3, GFR 30-59 ml/min ([facility]ly [facility]) 12/07/2015   • Essential hypertension 12/07/2015       Past Surgical History:  Past Surgical History:   Procedure Laterality Date   • SINUSCOPE     • THYROIDECTOMY      Left lobe removed   • TONSILLECTOMY     • TUBAL LIGATION          Allergies:  Codeine     Current Medications:    Current Outpatient Medications:   •  levothyroxine (SYNTHROID) 100 MCG Tab, TAKE ONE TABLET BY MOUTH EVERY MORNING on an empty stomach, Disp: 90 Tab, Rfl: 0  •  metFORMIN (GLUCOPHAGE) 850 MG Tab, Take 1 Tab by mouth 2 times a day, with meals.,  Disp: 60 Tab, Rfl: 3  •  enalapril-hydrochlorthiazide (VASERETIC) 10-25 MG per tablet, TAKE ONE TABLET BY MOUTH ONCE DAILY, Disp: 90 Tab, Rfl: 1  •  LORazepam (ATIVAN) 1 MG Tab, Take 1 mg by mouth every 8 hours as needed for Anxiety., Disp: , Rfl:   •  Cholecalciferol (VITAMIN D3) 2000 UNIT Cap, Take  by mouth every day., Disp: , Rfl:     Social History:  Social History     Socioeconomic History   • Marital status: Single     Spouse name: Not on file   • Number of children: Not on file   • Years of education: Not on file   • Highest education level: Not on file   Occupational History   • Not on file   Social Needs   • Financial resource strain: Not on file   • Food insecurity     Worry: Not on file     Inability: Not on file   • Transportation needs     Medical: Not on file     Non-medical: Not on file   Tobacco Use   • Smoking status: Never Smoker   • Smokeless tobacco: Never Used   Substance and Sexual Activity   • Alcohol use: No     Alcohol/week: 0.0 oz   • Drug use: Not Currently   • Sexual activity: Not Currently     Partners: Male     Birth control/protection: Post-Menopausal   Lifestyle   • Physical activity     Days per week: Not on file     Minutes per session: Not on file   • Stress: Not on file   Relationships   • Social connections     Talks on phone: Not on file     Gets together: Not on file     Attends Denominational service: Not on file     Active member of club or organization: Not on file     Attends meetings of clubs or organizations: Not on file     Relationship status: Not on file   • Intimate partner violence     Fear of current or ex partner: Not on file     Emotionally abused: Not on file     Physically abused: Not on file     Forced sexual activity: Not on file   Other Topics Concern   • Not on file   Social History Narrative   • Not on file        Family History:   Family History   Problem Relation Age of Onset   • Heart Failure Mother    • Diabetes Mother    • Cancer Father         lung-abest  "  • Lung Cancer Father    • Sleep Apnea Brother    • Stroke Neg Hx        PHYSICAL EXAM:   Vital signs: /80 (BP Location: Left arm, Patient Position: Sitting, BP Cuff Size: Large adult)   Pulse 94   Ht 1.575 m (5' 2\")   Wt 76.7 kg (169 lb 3.2 oz)   SpO2 98%   BMI 30.95 kg/m²   GENERAL: Well-developed, well-nourished  in no apparent distress.   EYE: No ocular and eyelid asymmetry, Anicteric sclerae,  PERRL, No exophthalmos or lidlag  HENT: Hearing grossly intact, Normocephalic, atraumatic. Pink, moist mucous membranes, No exudate  NECK: Supple. Trachea midline. There is a well healed transverse scar, Left lobe is absent  CARDIOVASCULAR: Regular rate and rhythm. No murmurs, rubs, or gallops.   LUNGS: Clear to auscultation bilaterally   ABDOMEN: Soft, nontender with positive bowel sounds.   EXTREMITIES: No clubbing, cyanosis, or edema.   NEUROLOGICAL: Cranial nerves II-XII are grossly intact   Symmetric reflexes at the patella no proximal muscle weakness, No visible tremor with both outstretched hands  LYMPH: No cervical, supraclavicular,  adenopathy palpated.   SKIN: No rashes, lesions. Turgor is normal.  FOOT: Normal sensation to monofilament testing, normal pulses, no ulcers.  Normal Vibration quantitative sensation test.    Labs:  Lab Results   Component Value Date/Time    HBA1C 5.6 09/16/2020 1614    AVGLUC 151 06/26/2020 0904       Lab Results   Component Value Date/Time    WBC 5.0 04/17/2020 09:11 AM    RBC 4.81 04/17/2020 09:11 AM    HEMOGLOBIN 14.1 04/17/2020 09:11 AM    MCV 87.1 04/17/2020 09:11 AM    MCH 29.3 04/17/2020 09:11 AM    MCHC 33.7 04/17/2020 09:11 AM    RDW 41.3 04/17/2020 09:11 AM    MPV 11.5 04/17/2020 09:11 AM       Lab Results   Component Value Date/Time    SODIUM 139 04/17/2020 09:11 AM    POTASSIUM 3.6 04/17/2020 09:11 AM    CHLORIDE 101 04/17/2020 09:11 AM    CO2 25 04/17/2020 09:11 AM    ANION 13.0 04/17/2020 09:11 AM    GLUCOSE 97 09/04/2020 07:03 AM    BUN 15 04/17/2020 09:11 " AM    CREATININE 1.06 04/17/2020 09:11 AM    CALCIUM 9.5 04/17/2020 09:11 AM    ASTSGOT 71 (H) 06/26/2020 09:04 AM    ALTSGPT 121 (H) 06/26/2020 09:04 AM    TBILIRUBIN 0.6 06/26/2020 09:04 AM    ALBUMIN 3.9 06/26/2020 09:04 AM    TOTPROTEIN 6.9 06/26/2020 09:04 AM    GLOBULIN 3.1 04/17/2020 09:11 AM    AGRATIO 1.2 04/17/2020 09:11 AM       Lab Results   Component Value Date/Time    CHOLSTRLTOT 182 09/04/2020 0703    TRIGLYCERIDE 121 09/04/2020 0703    HDL 44 09/04/2020 0703     (H) 09/04/2020 0703       No results found for: MICROALBCALC, MALBCRT, MALBEXCR, UYPOGP40, MICROALBUR, MICRALB, UMICROALBUM, MICROALBTIM     Lab Results   Component Value Date/Time    TSHULTRASEN 1.540 04/17/2020 0911     No results found for: FREEDIR  No results found for: FREET3  No results found for: THYSTIMIG      ASSESSMENT/PLAN:     1. Controlled type 2 diabetes mellitus without complication, without long-term current use of insulin (HCC)  Controlled  Reviewed pathogenesis of type 2 diabetes and the importance of good glucose control.  Foot exam was completed today  I want her to continue taking Metformin  Reviewed the plate method of eating  I reviewed proper foot care  I discussed the importance of annual eye exams I am requesting records  I refilled her medications  I discussed the importance of diet and exercise and lifestyle changes  I will see her again in 3 months with a repeat of her A1c    2. Postoperative hypothyroidism  Controlled  Reviewed pathogenesis of hypothyroidism  Continue levothyroxine 100 mcg daily  Reviewed importance of adherence we will repeat TSH levels with next labs in 3 months    3. Essential hypertension  Well-controlled continue current medications    4. Dyslipidemia  Unstable recommend starting atorvastatin for primary prevention because of her history of diabetes.  We will repeat her fasting lipids after 6 months      Return in about 3 months (around 12/16/2020).       This patient during there  office visit was started on new medication.  Side effects of new medications were discussed with the patient today in the office. The patient was supplied paperwork on this new medication.    Thank you kindly for allowing me to participate in the diabetes care plan for this patient.    Magnus Bourne MD, [facility], [facility]  09/16/20    CC:   ZIGGY Zaidi

## 2020-09-17 SDOH — HEALTH STABILITY: PHYSICAL HEALTH: ON AVERAGE, HOW MANY DAYS PER WEEK DO YOU ENGAGE IN MODERATE TO STRENUOUS EXERCISE (LIKE A BRISK WALK)?: 5 DAYS

## 2020-09-17 SDOH — ECONOMIC STABILITY: HOUSING INSECURITY
IN THE LAST 12 MONTHS, WAS THERE A TIME WHEN YOU DID NOT HAVE A STEADY PLACE TO SLEEP OR SLEPT IN A SHELTER (INCLUDING NOW)?: NO

## 2020-09-17 SDOH — ECONOMIC STABILITY: INCOME INSECURITY: IN THE LAST 12 MONTHS, WAS THERE A TIME WHEN YOU WERE NOT ABLE TO PAY THE MORTGAGE OR RENT ON TIME?: NO

## 2020-09-17 SDOH — ECONOMIC STABILITY: TRANSPORTATION INSECURITY
IN THE PAST 12 MONTHS, HAS LACK OF RELIABLE TRANSPORTATION KEPT YOU FROM MEDICAL APPOINTMENTS, MEETINGS, WORK OR FROM GETTING THINGS NEEDED FOR DAILY LIVING?: NO

## 2020-09-17 SDOH — ECONOMIC STABILITY: TRANSPORTATION INSECURITY
IN THE PAST 12 MONTHS, HAS THE LACK OF TRANSPORTATION KEPT YOU FROM MEDICAL APPOINTMENTS OR FROM GETTING MEDICATIONS?: NO

## 2020-09-17 SDOH — HEALTH STABILITY: PHYSICAL HEALTH: ON AVERAGE, HOW MANY MINUTES DO YOU ENGAGE IN EXERCISE AT THIS LEVEL?: 40 MINUTES

## 2020-09-17 SDOH — ECONOMIC STABILITY: HOUSING INSECURITY: IN THE LAST 12 MONTHS, HOW MANY PLACES HAVE YOU LIVED?: 1

## 2020-09-17 SDOH — HEALTH STABILITY: MENTAL HEALTH
STRESS IS WHEN SOMEONE FEELS TENSE, NERVOUS, ANXIOUS, OR CAN'T SLEEP AT NIGHT BECAUSE THEIR MIND IS TROUBLED. HOW STRESSED ARE YOU?: ONLY A LITTLE

## 2020-09-17 ASSESSMENT — SOCIAL DETERMINANTS OF HEALTH (SDOH)
DO YOU BELONG TO ANY CLUBS OR ORGANIZATIONS SUCH AS CHURCH GROUPS UNIONS, FRATERNAL OR ATHLETIC GROUPS, OR SCHOOL GROUPS?: NO
HOW OFTEN DO YOU ATTEND CHURCH OR RELIGIOUS SERVICES?: NEVER
HOW OFTEN DO YOU HAVE A DRINK CONTAINING ALCOHOL: NEVER
HOW OFTEN DO YOU ATTENT MEETINGS OF THE CLUB OR ORGANIZATION YOU BELONG TO?: NEVER
WITHIN THE PAST 12 MONTHS, THE FOOD YOU BOUGHT JUST DIDN'T LAST AND YOU DIDN'T HAVE MONEY TO GET MORE: NEVER TRUE
IN A TYPICAL WEEK, HOW MANY TIMES DO YOU TALK ON THE PHONE WITH FAMILY, FRIENDS, OR NEIGHBORS?: MORE THAN THREE TIMES A WEEK
HOW OFTEN DO YOU HAVE SIX OR MORE DRINKS ON ONE OCCASION: NEVER
HOW OFTEN DO YOU GET TOGETHER WITH FRIENDS OR RELATIVES?: ONCE A WEEK
WITHIN THE PAST 12 MONTHS, YOU WORRIED THAT YOUR FOOD WOULD RUN OUT BEFORE YOU GOT THE MONEY TO BUY MORE: NEVER TRUE
HOW HARD IS IT FOR YOU TO PAY FOR THE VERY BASICS LIKE FOOD, HOUSING, MEDICAL CARE, AND HEATING?: NOT HARD AT ALL

## 2020-09-24 ENCOUNTER — OFFICE VISIT (OUTPATIENT)
Dept: MEDICAL GROUP | Facility: PHYSICIAN GROUP | Age: 60
End: 2020-09-24
Payer: COMMERCIAL

## 2020-09-24 VITALS
DIASTOLIC BLOOD PRESSURE: 76 MMHG | RESPIRATION RATE: 16 BRPM | OXYGEN SATURATION: 95 % | WEIGHT: 168 LBS | TEMPERATURE: 97.9 F | BODY MASS INDEX: 30.91 KG/M2 | SYSTOLIC BLOOD PRESSURE: 130 MMHG | HEIGHT: 62 IN | HEART RATE: 83 BPM

## 2020-09-24 DIAGNOSIS — E78.5 DYSLIPIDEMIA: ICD-10-CM

## 2020-09-24 DIAGNOSIS — E11.9 TYPE 2 DIABETES MELLITUS WITHOUT COMPLICATION, WITHOUT LONG-TERM CURRENT USE OF INSULIN (HCC): ICD-10-CM

## 2020-09-24 DIAGNOSIS — Z76.89 ENCOUNTER TO ESTABLISH CARE: ICD-10-CM

## 2020-09-24 DIAGNOSIS — F41.1 GENERALIZED ANXIETY DISORDER: ICD-10-CM

## 2020-09-24 DIAGNOSIS — G47.33 OBSTRUCTIVE SLEEP APNEA: Chronic | ICD-10-CM

## 2020-09-24 DIAGNOSIS — E89.0 POSTOPERATIVE HYPOTHYROIDISM: ICD-10-CM

## 2020-09-24 DIAGNOSIS — G47.00 INSOMNIA, UNSPECIFIED TYPE: ICD-10-CM

## 2020-09-24 DIAGNOSIS — I10 ESSENTIAL HYPERTENSION: ICD-10-CM

## 2020-09-24 DIAGNOSIS — E66.9 OBESITY (BMI 30-39.9): ICD-10-CM

## 2020-09-24 DIAGNOSIS — N18.30 CKD (CHRONIC KIDNEY DISEASE) STAGE 3, GFR 30-59 ML/MIN: ICD-10-CM

## 2020-09-24 DIAGNOSIS — Z23 NEED FOR VACCINATION: ICD-10-CM

## 2020-09-24 PROBLEM — F41.9 ANXIETY: Status: RESOLVED | Noted: 2019-07-31 | Resolved: 2020-09-24

## 2020-09-24 PROBLEM — Z12.39 ENCOUNTER FOR SCREENING FOR MALIGNANT NEOPLASM OF BREAST: Status: RESOLVED | Noted: 2019-10-09 | Resolved: 2020-09-24

## 2020-09-24 PROCEDURE — 90686 IIV4 VACC NO PRSV 0.5 ML IM: CPT | Performed by: NURSE PRACTITIONER

## 2020-09-24 PROCEDURE — 90471 IMMUNIZATION ADMIN: CPT | Performed by: NURSE PRACTITIONER

## 2020-09-24 PROCEDURE — 90472 IMMUNIZATION ADMIN EACH ADD: CPT | Performed by: NURSE PRACTITIONER

## 2020-09-24 PROCEDURE — 90732 PPSV23 VACC 2 YRS+ SUBQ/IM: CPT | Performed by: NURSE PRACTITIONER

## 2020-09-24 PROCEDURE — 99214 OFFICE O/P EST MOD 30 MIN: CPT | Mod: 25 | Performed by: NURSE PRACTITIONER

## 2020-09-24 RX ORDER — ZOLPIDEM TARTRATE 10 MG/1
10 TABLET ORAL NIGHTLY PRN
Qty: 30 TAB | Refills: 2 | Status: SHIPPED | OUTPATIENT
Start: 2020-09-24 | End: 2020-12-17 | Stop reason: SDUPTHER

## 2020-09-24 RX ORDER — ENALAPRIL MALEATE AND HYDROCHLOROTHIAZIDE 10; 25 MG/1; MG/1
TABLET ORAL
Qty: 90 TAB | Refills: 2 | Status: SHIPPED | OUTPATIENT
Start: 2020-09-24 | End: 2021-06-02 | Stop reason: SDUPTHER

## 2020-09-24 RX ORDER — LORAZEPAM 1 MG/1
1 TABLET ORAL EVERY 8 HOURS PRN
Qty: 180 TAB | Refills: 0 | Status: SHIPPED | OUTPATIENT
Start: 2020-09-24 | End: 2020-12-21 | Stop reason: SDUPTHER

## 2020-09-24 RX ORDER — ZOLPIDEM TARTRATE 10 MG/1
TABLET ORAL
COMMUNITY
Start: 2017-12-15 | End: 2020-09-24

## 2020-09-24 ASSESSMENT — FIBROSIS 4 INDEX: FIB4 SCORE: 1.7

## 2020-09-24 NOTE — ASSESSMENT & PLAN NOTE
Chronic medical problem.  She is currently taking metformin 850 mg BID.  She is tolerating medication.  She is followed by endocrinology.  She had recent endocrinology appointment.  Her A1c is at goal.  Last A1c:  Results for FREDDY ARROYO (MRN [id number]) as of 9/24/2020 12:03   Ref. Range 9/16/2020 16:14   Glycohemoglobin Latest Ref Range: 0.0 - 5.6 % 5.6

## 2020-09-24 NOTE — ASSESSMENT & PLAN NOTE
Chronic medical problem.  She is taking lorazepam 1 mg every 8 hours PRN. Her last dose was 3 days ago.  She has been on this medication for years and is tolerating the medication.  She would like a medication refill today.  She is due for controlled substance treatment agreement today.

## 2020-09-24 NOTE — ASSESSMENT & PLAN NOTE
Chronic medical problem.  She is taking enalapril-hydrochlorothiazide 10-25 mg daily. Her BP is at goal.  She is tolerating medication.  She would like a medication refill today.  Denies any chest pain, shortness of breath, dizziness, palpitations, blurry vision, or headaches.

## 2020-09-24 NOTE — PROGRESS NOTES
Subjective:     CC:  Diagnoses of Need for vaccination, Type 2 diabetes mellitus without complication, without long-term current use of insulin ([facility]), Obstructive sleep apnea, Insomnia, unspecified type, Dyslipidemia, Essential hypertension, Postoperative hypothyroidism, ARMEN (generalized anxiety disorder), Obesity (BMI 30-39.9), CKD (chronic kidney disease) stage 3, GFR 30-59 ml/min ([facility]), and Encounter to establish care were pertinent to this visit.    HISTORY OF THE PRESENT ILLNESS: Patient is a 59 y.o. female. This pleasant patient is here today to establish care and discuss the following. Her prior PCP was SOCORRO Hernández.    Type 2 diabetes mellitus without complication, without long-term current use of insulin ([facility])  Chronic medical problem.  She is currently taking metformin 850 mg BID.  She is tolerating medication.  She is followed by endocrinology.  She had recent endocrinology appointment.  Her A1c is at goal.  Last A1c:  Results for FREDDY ARROYO (MRN [id number]) as of 9/24/2020 12:03   Ref. Range 9/16/2020 16:14   Glycohemoglobin Latest Ref Range: 0.0 - 5.6 % 5.6         Obstructive sleep apnea  Chronic medical problem. She wears Bipap nightly.  She is tolerating her machine.  She is followed by pulmonology annually.  She does wake up feeling rested.    Insomnia  Chronic medical problem. She is taking Ambien 10 mg at bedtime. She does takes nightly. She is tolerating the medication.  She has been on this dose for years.  She would like a medication refill today.  She is due for controlled substance treatment agreement today.    Dyslipidemia  Chronic medical problem. She is taking atorvastatin 20 mg daily. She is tolerating the medication.  Last lab results:  Results for FREDDY ARROYO (MRN [id number]) as of 9/24/2020 12:03   Ref. Range 9/4/2020 07:03   Cholesterol,Tot Latest Ref Range: 100 - 199 mg/dL 182   Triglycerides Latest Ref Range: 0 - 149 mg/dL 121   HDL Latest Ref Range: >=40 mg/dL 44   LDL  Latest Ref Range: <100 mg/dL 114 (H)       Essential hypertension  Chronic medical problem.  She is taking enalapril-hydrochlorothiazide 10-25 mg daily. Her BP is at goal.  She is tolerating medication.  She would like a medication refill today.  Denies any chest pain, shortness of breath, dizziness, palpitations, blurry vision, or headaches.    Hypothyroidism  Chronic medical problem.  She is currently taking levothyroxine 100 mcg daily on empty stomach.  She is followed by endocrinology.  She denies any fatigue, weight gain or weight loss, no temperature intolerances dry skin, hair loss, or constipation.  Last lab results:  Results for FREDDY ARROYO (MRN [id number]) as of 9/24/2020 12:03   Ref. Range 4/17/2020 09:11   TSH Latest Ref Range: 0.380 - 5.330 uIU/mL 1.540       ARMEN (generalized anxiety disorder)  Chronic medical problem.  She is taking lorazepam 1 mg every 8 hours PRN. Her last dose was 3 days ago.  She has been on this medication for years and is tolerating the medication.  She would like a medication refill today.  She is due for controlled substance treatment agreement today.    Obesity (BMI 30-39.9)  Chronic medical problem.  Her BMI today is 30.73.  She is exercising and watching her diet.    CKD (chronic kidney disease) stage 3, GFR 30-59 ml/min  Chronic medical problem.  She is not taking any NSAIDs.  Her BP is controlled.  Last lab results:  Results for FREDDY ARROYO (MRN [id number]) as of 9/24/2020 12:03   Ref. Range 1/17/2019 06:22 4/17/2020 09:11   GFR If Non  Latest Ref Range: >60 mL/min/1.73 m 2 49 (A) 53 (A)       Allergies: Codeine    Current Outpatient Medications Ordered in Epic   Medication Sig Dispense Refill   • zolpidem (AMBIEN) 10 MG Tab Take 1 Tab by mouth at bedtime as needed for Sleep for up to 30 days. 30 Tab 2   • LORazepam (ATIVAN) 1 MG Tab Take 1 Tab by mouth every 8 hours as needed for Anxiety for up to 60 days. 180 Tab 0   • enalapril-hydrochlorthiazide  (VASERETIC) 10-25 MG per tablet TAKE ONE TABLET BY MOUTH ONCE DAILY 90 Tab 2   • atorvastatin (LIPITOR) 20 MG Tab Take 1 Tab by mouth every day for 90 days. 90 Tab 3   • metFORMIN (GLUCOPHAGE) 850 MG Tab Take 1 Tab by mouth 2 times a day, with meals for 90 days. 180 Tab 3   • levothyroxine (SYNTHROID) 100 MCG Tab Take 1 Tab by mouth Every morning on an empty stomach for 90 days. ON EMPTY STOMACH 90 Tab 3   • Cholecalciferol (VITAMIN D3) 2000 UNIT Cap Take  by mouth every day.       No current [facility]-ordered facility-administered medications on file.        Past Medical History:   Diagnosis Date   • Anxiety    • Breast cancer screening 10/9/2019     IMO load March 2020   • Chickenpox    • Dyslipidemia 9/16/2020   • [place] measles    • Hypertension    • Hypothyroidism    • Nasal drainage    • 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    • Sleep apnea    • Thyroid disease    • Tonsillitis        Past Surgical History:   Procedure Laterality Date   • SINUSCOPE     • THYROIDECTOMY Left 2000's    Left lobe removed   • TONSILLECTOMY     • TUBAL LIGATION         Social History     Tobacco Use   • Smoking status: Never Smoker   • Smokeless tobacco: Never Used   Substance Use Topics   • Alcohol use: No     Alcohol/week: 0.0 oz     Frequency: Never     Binge frequency: Never   • Drug use: Not Currently       Social History     Social History Narrative   • Not on file       Family History   Problem Relation Age of Onset   • Heart Failure Mother    • Diabetes Mother    • Cancer Father         lung-abest   • Lung Cancer Father    • Diabetes Sister    • Sleep Apnea Sister    • Heart Disease Sister    • Sleep Apnea Brother    • No Known Problems Brother    • Heart Disease Maternal Grandmother         heart valve   • Cancer Paternal Grandmother         liver   • Stroke Neg Hx        Health Maintenance: Due for hepatitis C screening, urine microalbumin, Pneumovax, zoster vaccine, Pap smear, and influenza  "vaccine.  She is agreeable to pneumonia and flu vaccine today.  She had her monofilament exam completed recently with endocrine 9/15/2020.    ROS:   Gen: no fevers/chills, no changes in weight, no fatigue, no temperature intolerances  Eyes: no changes in vision  ENT: no sore throat, no ear pain  Pulm: no sob, no cough  CV: no chest pain, no palpitations  GI: no nausea/vomiting, no diarrhea, no constipation  : no dysuria  MSk: no myalgias  Skin: no rash, no dry skin, no hair loss  Neuro: no headaches, no dizziness    Objective:     Vital signs reviewed  Exam: /76 (BP Location: Right arm, Patient Position: Sitting, BP Cuff Size: Large adult)   Pulse 83   Temp 36.6 °C (97.9 °F) (Temporal)   Resp 16   Ht 1.575 m (5' 2\")   Wt 76.2 kg (168 lb)   SpO2 95%  Body mass index is 30.73 kg/m².    General: Normal appearing. No distress.  HENT: Normocephalic. Ears normal shape and contour, canals are clear bilaterally, tympanic membranes are benign.   Eyes: Eyes conjunctiva clear lids without ptosis, pupils equal and reactive to light accommodation, lids normal.  Neck: Supple without JVD. Thyroid is not enlarged.  Pulmonary: Clear to ausculation.  Normal effort. No rales, ronchi, or wheezing.  Cardiovascular: Regular rate and rhythm with systolic murmur. Radial pulses are intact and equal bilaterally.  Abdomen: Soft, nontender, nondistended.   Neurologic: Grossly nonfocal  Lymph: No cervical or supraclavicular lymph nodes are palpable  Skin: Warm and dry.  No obvious lesions.  Musculoskeletal: Normal gait. No extremity cyanosis, clubbing, or edema.  Psych: Normal mood and affect. Alert and oriented x3. Judgment and insight is normal.    Assessment & Plan:   59 y.o. female with the following -    1. Encounter to establish care  New problem to examiner.  Care established.  Lab results from 4/17/2020, 6/26/2020 and 9/16/2020 reviewed today.    2. Type 2 diabetes mellitus without complication, without long-term current " use of insulin ([facility])  New problem to examiner.  Continue metformin.  Continue follow-up with endocrinology.  She is up-to-date on her health maintenance.  Monitor and follow.    3. Essential hypertension  New problem to examiner.  Continue enalapril-hydrochlorothiazide.  Medication refilled today.  BP is at goal today.  Monitor and follow.  - enalapril-hydrochlorthiazide (VASERETIC) 10-25 MG per tablet; TAKE ONE TABLET BY MOUTH ONCE DAILY  Dispense: 90 Tab; Refill: 2    4. Dyslipidemia  New problem to examiner.  Continue Atorvastatin.  She is up-to-date on labs.  Monitor and follow.    5. Postoperative hypothyroidism  New problem to examiner.  Continue levothyroxine.  Continue follow-up with endocrinology.  She is up-to-date on labs.  Monitor and follow.    6. Insomnia, unspecified type  New problem to examiner.  Continue amlodipine.  Medication refilled electronically and sent to [facility] in [place].  PDMP reviewed today.  Discussed controlled substance treatment given patient, she verbalized understanding and signed.  Monitor and follow.  - Controlled Substance Treatment Agreement  - zolpidem (AMBIEN) 10 MG Tab; Take 1 Tab by mouth at bedtime as needed for Sleep for up to 30 days.  Dispense: 30 Tab; Refill: 2    7. ARMEN (generalized anxiety disorder)  New problem to examiner.  Continue lorazepam.  Medication refilled electronically and sent to [facility] in [place].  PDMP reviewed today.  Discussed controlled substance treatment given with patient, she verbalized understanding and signed.  Monitor and follow.  - Controlled Substance Treatment Agreement  - LORazepam (ATIVAN) 1 MG Tab; Take 1 Tab by mouth every 8 hours as needed for Anxiety for up to 60 days.  Dispense: 180 Tab; Refill: 0    8. CKD (chronic kidney disease) stage 3, GFR 30-59 ml/min ([facility])  New problem to examiner.  Continue to avoid NSAIDs.  Labs are stable.  Monitor and follow.    9. Obstructive sleep apnea  New problem to examiner.  Continue nightly BiPAP.   Continue annual follow-up with pulmonology.  Monitor and follow.    10. Obesity (BMI 30-39.9)  New problem to examiner.  Continue diet lifestyle modifications.  Stable BMI.  Monitor and follow.    11. Need for vaccination  New problem to examiner.  Vaccine indicated.  Patient is in agreement. I have placed the below orders and discussed them with an approved delegating provider. The MA is performing the below orders under the direction of Dr. Talley.  - Influenza Vaccine Quad Injection (PF)  - Pneumococal Polysaccharide Vaccine 23-Valent =>3YO SQ/IM        Return in about 3 months (around 12/24/2020) for med refill .    Please note that this dictation was created using voice recognition software. I have made every reasonable attempt to correct obvious errors, but I expect that there are errors of grammar and possibly content that I did not discover before finalizing the note.

## 2020-09-24 NOTE — ASSESSMENT & PLAN NOTE
Chronic medical problem. She is taking Ambien 10 mg at bedtime. She does takes nightly. She is tolerating the medication.  She has been on this dose for years.  She would like a medication refill today.  She is due for controlled substance treatment agreement today.

## 2020-09-24 NOTE — ASSESSMENT & PLAN NOTE
Chronic medical problem.  She is currently taking levothyroxine 100 mcg daily on empty stomach.  She is followed by endocrinology.  She denies any fatigue, weight gain or weight loss, no temperature intolerances dry skin, hair loss, or constipation.  Last lab results:  Results for FREDDY ARROYO (MRN [id number]) as of 9/24/2020 12:03   Ref. Range 4/17/2020 09:11   TSH Latest Ref Range: 0.380 - 5.330 uIU/mL 1.540

## 2020-09-24 NOTE — ASSESSMENT & PLAN NOTE
Chronic medical problem. She wears Bipap nightly.  She is tolerating her machine.  She is followed by pulmonology annually.  She does wake up feeling rested.

## 2020-09-24 NOTE — ASSESSMENT & PLAN NOTE
Chronic medical problem. She is taking atorvastatin 20 mg daily. She is tolerating the medication.  Last lab results:  Results for FREDDY ARROYO (MRN [id number]) as of 9/24/2020 12:03   Ref. Range 9/4/2020 07:03   Cholesterol,Tot Latest Ref Range: 100 - 199 mg/dL 182   Triglycerides Latest Ref Range: 0 - 149 mg/dL 121   HDL Latest Ref Range: >=40 mg/dL 44   LDL Latest Ref Range: <100 mg/dL 114 (H)

## 2020-11-06 ENCOUNTER — HOSPITAL ENCOUNTER (OUTPATIENT)
Dept: RADIOLOGY | Facility: MEDICAL CENTER | Age: 60
End: 2020-11-06
Attending: NURSE PRACTITIONER
Payer: COMMERCIAL

## 2020-11-06 DIAGNOSIS — Z12.31 VISIT FOR SCREENING MAMMOGRAM: ICD-10-CM

## 2020-11-06 PROCEDURE — 77067 SCR MAMMO BI INCL CAD: CPT

## 2020-12-15 ENCOUNTER — HOSPITAL ENCOUNTER (OUTPATIENT)
Dept: LAB | Facility: MEDICAL CENTER | Age: 60
End: 2020-12-15
Attending: INTERNAL MEDICINE
Payer: COMMERCIAL

## 2020-12-15 DIAGNOSIS — E89.0 POSTOPERATIVE HYPOTHYROIDISM: ICD-10-CM

## 2020-12-15 DIAGNOSIS — E78.5 DYSLIPIDEMIA: ICD-10-CM

## 2020-12-15 DIAGNOSIS — I10 ESSENTIAL HYPERTENSION: ICD-10-CM

## 2020-12-15 DIAGNOSIS — E11.9 CONTROLLED TYPE 2 DIABETES MELLITUS WITHOUT COMPLICATION, WITHOUT LONG-TERM CURRENT USE OF INSULIN (HCC): ICD-10-CM

## 2020-12-15 LAB
ALBUMIN SERPL BCP-MCNC: 4.1 G/DL (ref 3.2–4.9)
ALBUMIN/GLOB SERPL: 1.5 G/DL
ALP SERPL-CCNC: 83 U/L (ref 30–99)
ALT SERPL-CCNC: 33 U/L (ref 2–50)
ANION GAP SERPL CALC-SCNC: 9 MMOL/L (ref 7–16)
AST SERPL-CCNC: 19 U/L (ref 12–45)
BASOPHILS # BLD AUTO: 0.7 % (ref 0–1.8)
BASOPHILS # BLD: 0.04 K/UL (ref 0–0.12)
BILIRUB SERPL-MCNC: 0.7 MG/DL (ref 0.1–1.5)
BUN SERPL-MCNC: 18 MG/DL (ref 8–22)
CALCIUM SERPL-MCNC: 9.7 MG/DL (ref 8.5–10.5)
CHLORIDE SERPL-SCNC: 103 MMOL/L (ref 96–112)
CO2 SERPL-SCNC: 26 MMOL/L (ref 20–33)
CREAT SERPL-MCNC: 0.97 MG/DL (ref 0.5–1.4)
CREAT UR-MCNC: 189.17 MG/DL
EOSINOPHIL # BLD AUTO: 0.17 K/UL (ref 0–0.51)
EOSINOPHIL NFR BLD: 3.2 % (ref 0–6.9)
ERYTHROCYTE [DISTWIDTH] IN BLOOD BY AUTOMATED COUNT: 40.3 FL (ref 35.9–50)
EST. AVERAGE GLUCOSE BLD GHB EST-MCNC: 123 MG/DL
FASTING STATUS PATIENT QL REPORTED: NORMAL
GLOBULIN SER CALC-MCNC: 2.8 G/DL (ref 1.9–3.5)
GLUCOSE SERPL-MCNC: 110 MG/DL (ref 65–99)
HBA1C MFR BLD: 5.9 % (ref 0–5.6)
HCT VFR BLD AUTO: 42.6 % (ref 37–47)
HGB BLD-MCNC: 13.8 G/DL (ref 12–16)
IMM GRANULOCYTES # BLD AUTO: 0.01 K/UL (ref 0–0.11)
IMM GRANULOCYTES NFR BLD AUTO: 0.2 % (ref 0–0.9)
LYMPHOCYTES # BLD AUTO: 2.24 K/UL (ref 1–4.8)
LYMPHOCYTES NFR BLD: 41.8 % (ref 22–41)
MCH RBC QN AUTO: 28.2 PG (ref 27–33)
MCHC RBC AUTO-ENTMCNC: 32.4 G/DL (ref 33.6–35)
MCV RBC AUTO: 87.1 FL (ref 81.4–97.8)
MICROALBUMIN UR-MCNC: 3.3 MG/DL
MICROALBUMIN/CREAT UR: 17 MG/G (ref 0–30)
MONOCYTES # BLD AUTO: 0.69 K/UL (ref 0–0.85)
MONOCYTES NFR BLD AUTO: 12.9 % (ref 0–13.4)
NEUTROPHILS # BLD AUTO: 2.21 K/UL (ref 2–7.15)
NEUTROPHILS NFR BLD: 41.2 % (ref 44–72)
NRBC # BLD AUTO: 0 K/UL
NRBC BLD-RTO: 0 /100 WBC
PLATELET # BLD AUTO: 253 K/UL (ref 164–446)
PMV BLD AUTO: 12 FL (ref 9–12.9)
POTASSIUM SERPL-SCNC: 3.5 MMOL/L (ref 3.6–5.5)
PROT SERPL-MCNC: 6.9 G/DL (ref 6–8.2)
RBC # BLD AUTO: 4.89 M/UL (ref 4.2–5.4)
SODIUM SERPL-SCNC: 138 MMOL/L (ref 135–145)
T4 FREE SERPL-MCNC: 1.82 NG/DL (ref 0.93–1.7)
TSH SERPL DL<=0.005 MIU/L-ACNC: 0.68 UIU/ML (ref 0.38–5.33)
WBC # BLD AUTO: 5.4 K/UL (ref 4.8–10.8)

## 2020-12-15 PROCEDURE — 82043 UR ALBUMIN QUANTITATIVE: CPT

## 2020-12-15 PROCEDURE — 84439 ASSAY OF FREE THYROXINE: CPT

## 2020-12-15 PROCEDURE — 85025 COMPLETE CBC W/AUTO DIFF WBC: CPT

## 2020-12-15 PROCEDURE — 36415 COLL VENOUS BLD VENIPUNCTURE: CPT

## 2020-12-15 PROCEDURE — 82570 ASSAY OF URINE CREATININE: CPT

## 2020-12-15 PROCEDURE — 83036 HEMOGLOBIN GLYCOSYLATED A1C: CPT

## 2020-12-15 PROCEDURE — 84443 ASSAY THYROID STIM HORMONE: CPT

## 2020-12-15 PROCEDURE — 80053 COMPREHEN METABOLIC PANEL: CPT

## 2020-12-17 ENCOUNTER — TELEMEDICINE (OUTPATIENT)
Dept: MEDICAL GROUP | Facility: PHYSICIAN GROUP | Age: 60
End: 2020-12-17
Payer: COMMERCIAL

## 2020-12-17 VITALS — WEIGHT: 164 LBS | HEIGHT: 62 IN | BODY MASS INDEX: 30.18 KG/M2 | TEMPERATURE: 97.3 F

## 2020-12-17 DIAGNOSIS — I10 ESSENTIAL HYPERTENSION: ICD-10-CM

## 2020-12-17 DIAGNOSIS — F41.1 GENERALIZED ANXIETY DISORDER: ICD-10-CM

## 2020-12-17 DIAGNOSIS — G47.00 INSOMNIA, UNSPECIFIED TYPE: ICD-10-CM

## 2020-12-17 PROCEDURE — 99214 OFFICE O/P EST MOD 30 MIN: CPT | Mod: 95,CR | Performed by: NURSE PRACTITIONER

## 2020-12-17 RX ORDER — ZOLPIDEM TARTRATE 10 MG/1
10 TABLET ORAL NIGHTLY PRN
Qty: 30 TAB | Refills: 2 | Status: SHIPPED | OUTPATIENT
Start: 2020-12-17 | End: 2021-03-24 | Stop reason: SDUPTHER

## 2020-12-17 RX ORDER — LEVOTHYROXINE SODIUM 0.1 MG/1
TABLET ORAL
COMMUNITY
Start: 2020-12-16 | End: 2020-12-22 | Stop reason: SDUPTHER

## 2020-12-17 RX ORDER — ZOLPIDEM TARTRATE 10 MG/1
TABLET ORAL
COMMUNITY
Start: 2020-11-30 | End: 2020-12-17

## 2020-12-17 ASSESSMENT — FIBROSIS 4 INDEX: FIB4 SCORE: 0.78

## 2020-12-18 NOTE — ASSESSMENT & PLAN NOTE
Chronic medical problem. She is taking zolpidem 10 mg at bedtime. She would like a medication refill today. She is tolerating the medication. She uses CPAP machine at nighttime.

## 2020-12-18 NOTE — ASSESSMENT & PLAN NOTE
Chronic medical problem. She is checking her blood pressure at home and gets normal readings. She is taking enalapril-hydrochlorothiazide 10-25 mg daily. Denies chest pain, shortness of breath, dizziness, palpations, blurry vision or headaches.

## 2020-12-18 NOTE — ASSESSMENT & PLAN NOTE
Chronic medical problem. She is taking lorazepam 1 mg every 8 hours PRN. On average during the week she is taking the medication 3 days a week for 1-2 pills a day.  She is tolerating the medication.  She denies any self-harm or SI today.  She does not need a medication refill today as she has medication list a couple more weeks.

## 2020-12-18 NOTE — PROGRESS NOTES
Virtual Visit: Established Patient   This visit was conducted via Zoom using secure and encrypted videoconferencing technology. The patient was in a private location in the state of [place].    The patient's identity was confirmed and verbal consent was obtained for this virtual visit.    Subjective:   CC:   Chief Complaint   Patient presents with   • Medication Refill     enapril lorazepam, ambiem        Koki Townsend is a 60 y.o. female presenting for evaluation and management of:    Insomnia  Chronic medical problem. She is taking zolpidem 10 mg at bedtime. She would like a medication refill today. She is tolerating the medication. She uses CPAP machine at nighttime.     ARMEN (generalized anxiety disorder)  Chronic medical problem. She is taking lorazepam 1 mg every 8 hours PRN. On average during the week she is taking the medication 3 days a week for 1-2 pills a day.  She is tolerating the medication.  She denies any self-harm or SI today.  She does not need a medication refill today as she has medication list a couple more weeks.    Essential hypertension  Chronic medical problem. She is checking her blood pressure at home and gets normal readings. She is taking enalapril-hydrochlorothiazide 10-25 mg daily. Denies chest pain, shortness of breath, dizziness, palpations, blurry vision or headaches.         ROS   Denies any recent fevers or chills. No nausea or vomiting. No chest pains or shortness of breath.  Denies any self-harm or SI today.    Allergies   Allergen Reactions   • Codeine Itching       Current medicines (including changes today)  Current Outpatient Medications   Medication Sig Dispense Refill   • metFORMIN (GLUCOPHAGE) 850 MG Tab Take 850 mg by mouth 2 times a day, with meals.     • zolpidem (AMBIEN) 10 MG Tab Take 1 Tab by mouth at bedtime as needed for Sleep for up to 30 days. 30 Tab 2   • enalapril-hydrochlorthiazide (VASERETIC) 10-25 MG per tablet TAKE ONE TABLET BY MOUTH ONCE DAILY 90 Tab 2   •  "Cholecalciferol (VITAMIN D3) 2000 UNIT Cap Take  by mouth every day.     • levothyroxine (SYNTHROID) 100 MCG Tab        No current facility-administered medications for this visit.        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    • Impaired fasting blood sugar 12/07/2015   • CKD (chronic kidney disease) stage 3, GFR 30-59 ml/min 12/07/2015   • Essential hypertension 12/07/2015       Family History   Problem Relation Age of Onset   • Heart Failure Mother    • Diabetes Mother    • Cancer Father         lung-abest   • Lung Cancer Father    • Diabetes Sister    • Sleep Apnea Sister    • Heart Disease Sister    • Sleep Apnea Brother    • No Known Problems Brother    • Heart Disease Maternal Grandmother         heart valve   • Cancer Paternal Grandmother         liver   • Stroke Neg Hx        She  has a past medical history of Anxiety, Breast cancer screening (10/9/2019), Chickenpox, Dyslipidemia (9/16/2020), [place] measles, Hypertension, Hypothyroidism, Nasal drainage, 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, Sleep apnea, Thyroid disease, and Tonsillitis. She also has no past medical history of Depression.  She  has a past surgical history that includes thyroidectomy (Left, 2000's); sinuscope; tonsillectomy; and tubal ligation.       Objective:   Temp 36.3 °C (97.3 °F) (Tympanic)   Ht 1.575 m (5' 2\")   Wt 74.4 kg (164 lb)   BMI 30.00 kg/m²   Vital signs reviewed    Physical Exam:  Constitutional: Alert, no distress, " well-groomed.  Skin: No rashes in visible areas.  Eye: Round. Conjunctiva clear, lids normal. No icterus.  Glasses in place.  ENMT: Lips pink without lesions, good dentition, moist mucous membranes. Phonation normal.  Neck: No masses, no thyromegaly. Moves freely without pain.  Respiratory: Unlabored respiratory effort, no cough or audible wheeze.  Able to speak in complete sentences.  Psych: Alert and oriented x3, normal affect and mood.       Assessment and Plan:   The following treatment plan was discussed:     1. Insomnia, unspecified type  Chronic stable medical problem.  Continue zolpidem, medication electronically refilled and sent to pharmacy.  PDMP reviewed today.  She will be due for UDS at her next appointment.  Monitor and follow.  - zolpidem (AMBIEN) 10 MG Tab; Take 1 Tab by mouth at bedtime as needed for Sleep for up to 30 days.  Dispense: 30 Tab; Refill: 2    2. ARMEN (generalized anxiety disorder)  Chronic stable medical problem.  Continue lorazepam.  PDMP reviewed today.  She does not need a medication refill today.  She will be due for UDS at her next appointment.  She denies any self-harm or SI today.  Monitor and follow.    3. Essential hypertension  Chronic stable medical problem.  Continue enalapril-hydrochlorothiazide.  Continue home blood pressure monitoring.  Red flags discussed.  Monitor and follow.    Other orders  - metFORMIN (GLUCOPHAGE) 850 MG Tab; Take 850 mg by mouth 2 times a day, with meals.  - levothyroxine (SYNTHROID) 100 MCG Tab    Follow-up: Return in about 3 months (around 3/17/2021) for medication refill .

## 2020-12-20 ENCOUNTER — PATIENT MESSAGE (OUTPATIENT)
Dept: MEDICAL GROUP | Facility: PHYSICIAN GROUP | Age: 60
End: 2020-12-20

## 2020-12-20 DIAGNOSIS — Z23 NEED FOR VACCINATION: ICD-10-CM

## 2020-12-20 DIAGNOSIS — F41.1 GENERALIZED ANXIETY DISORDER: ICD-10-CM

## 2020-12-21 NOTE — TELEPHONE ENCOUNTER
From: Koki Townsend  To: DES Dye  Sent: 12/20/2020 5:45 AM PST  Subject: Prescription Question    Hi Hawk,    In gong through the virtual visit with Jamee, she had asked me to make a 3 month appointment for her through you, to go over medications again and to go over the urine test that she put in the computer that I will call and schedule for. She was going to put in a refill for my Zolipem (Ambien) and I had informed her I had about 3 weeks left of my Lorazapam but I actually have a little less then 3 weeks and with the holiday's coming up I would like her to also refill my Lorazapam so there is no delay with [facility]. In our visit she had mentioned I could just call/write to you to have you request this for me, as I usually don't go through my chart for refills. Please let me know if there are any issues.   Thank you,  Koki PEREA

## 2020-12-21 NOTE — PATIENT COMMUNICATION
Received request via: Patient    Was the patient seen in the last year in this department? Yes lov 12/17/2020    Does the patient have an active prescription (recently filled or refills available) for medication(s) requested? No

## 2020-12-22 ENCOUNTER — TELEMEDICINE (OUTPATIENT)
Dept: ENDOCRINOLOGY | Facility: MEDICAL CENTER | Age: 60
End: 2020-12-22
Attending: INTERNAL MEDICINE
Payer: COMMERCIAL

## 2020-12-22 ENCOUNTER — APPOINTMENT (OUTPATIENT)
Dept: FAMILY PLANNING/WOMEN'S HEALTH CLINIC | Facility: IMMUNIZATION CENTER | Age: 60
End: 2020-12-22
Attending: FAMILY MEDICINE
Payer: COMMERCIAL

## 2020-12-22 DIAGNOSIS — E89.0 POSTOPERATIVE HYPOTHYROIDISM: ICD-10-CM

## 2020-12-22 DIAGNOSIS — E78.5 DYSLIPIDEMIA: ICD-10-CM

## 2020-12-22 DIAGNOSIS — E11.9 CONTROLLED TYPE 2 DIABETES MELLITUS WITHOUT COMPLICATION, WITHOUT LONG-TERM CURRENT USE OF INSULIN (HCC): ICD-10-CM

## 2020-12-22 DIAGNOSIS — I10 ESSENTIAL HYPERTENSION: ICD-10-CM

## 2020-12-22 PROCEDURE — 99214 OFFICE O/P EST MOD 30 MIN: CPT | Mod: 95,CR | Performed by: INTERNAL MEDICINE

## 2020-12-22 PROCEDURE — 91300 PFIZER SARS-COV-2 VACCINE: CPT

## 2020-12-22 PROCEDURE — 0001A PFIZER SARS-COV-2 VACCINE: CPT

## 2020-12-22 RX ORDER — LEVOTHYROXINE SODIUM 0.1 MG/1
100 TABLET ORAL
Qty: 90 TAB | Refills: 2 | Status: SHIPPED | OUTPATIENT
Start: 2020-12-22 | End: 2021-03-22

## 2020-12-22 RX ORDER — LORAZEPAM 1 MG/1
1 TABLET ORAL EVERY 8 HOURS PRN
Qty: 180 TAB | Refills: 0 | Status: SHIPPED | OUTPATIENT
Start: 2020-12-22 | End: 2021-03-24 | Stop reason: SDUPTHER

## 2020-12-22 RX ORDER — ATORVASTATIN CALCIUM 20 MG/1
20 TABLET, FILM COATED ORAL DAILY
Qty: 90 TAB | Refills: 3 | Status: SHIPPED | OUTPATIENT
Start: 2020-12-22 | End: 2021-03-22

## 2020-12-22 RX ORDER — EMPAGLIFLOZIN 10 MG/1
1 TABLET, FILM COATED ORAL DAILY
Qty: 30 TAB | Refills: 6 | Status: SHIPPED | OUTPATIENT
Start: 2020-12-22 | End: 2021-03-01 | Stop reason: SDUPTHER

## 2020-12-22 NOTE — PROGRESS NOTES
CHIEF COMPLAINT: Patient is here for follow up of Type 2 Diabetes Mellitus. Patient was presented for a telehealth consultation via secure and encrypted videoconferencing technology. This encounter was conducted via Zoom . Verbal consent was obtained. Patient's identity was verified.    HPI:     Koki Townsend is a 60 y.o. female with Type 2 Diabetes Mellitus here for follow up.    Labs from 12/15/2020 HbA1c is 5.9%    On follow up she is on Metformin 850mg bid  She is tolerating this well     She has postsurgical hypothyroidism with surgery by Dr. Hines with pathology  She is on Levothyroxine 100mcg daily which has been her dose for 6-12 months    Her TSH was 0.681 on 12/15/2020      She has hyperlipidemia and is on Atorvastatin  She is tolerating this well  Her LDL at baseline was 114 prior to statin therapy on 9/2020      She doesn't have microalbuminuria on labs from 12/2020      BG Diary:12/22/20  She doesn't check her BG    Weight has been stable    Diabetes Complications   Retinopathy: No known retinopathy.  Last eye exam:  12/2020 with Dr. Manjit MAHMOOD retina  Neuropathy: Denies paresthesias or numbness in hands or feet. Denies any foot wounds.  Exercise: Minimal.  Diet: Fair.  Patient's medications, allergies, and social histories were reviewed and updated as appropriate.    ROS:     CONS:     No fever, no chills   EYES:     No diplopia, no blurry vision   CV:           No chest pain, no palpitations   PULM:     No SOB, no cough, no hemoptysis.   GI:            No nausea, no vomiting, no diarrhea, no constipation   ENDO:     No polyuria, no polydipsia, no heat intolerance, no cold intolerance       Past Medical History:  Problem List:  2020-09: Dyslipidemia  2020-07: Type 2 diabetes mellitus without complication, without long-  term current use of insulin (HCC)  2020-07: Soft tissue mass  2020-06: Obesity (BMI 30-39.9)  2020-05: Elevated LFTs  2019-10: Breast cancer screening  2019-07: Anxiety  2018-01: ARMEN  (generalized anxiety disorder)  2017-07: Hypothyroidism  2017-04: BMI 32.0-32.9,adult  2017-02: Insomnia  2017-02: Hypothyroidism  2017-01: Obstructive sleep apnea  2015-12: Impaired fasting blood sugar  2015-12: CKD (chronic kidney disease) stage 3, GFR 30-59 ml/min  2015-12: Other specified hypothyroidism  2015-12: Essential hypertension  2012-12: Health examination of defined subpopulation  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      Past Surgical History:  Past Surgical History:   Procedure Laterality Date   • SINUSCOPE     • THYROIDECTOMY Left 2000's    Left lobe removed   • TONSILLECTOMY     • TUBAL LIGATION          Allergies:  Codeine     Social History:  Social History     Tobacco Use   • Smoking status: Never Smoker   • Smokeless tobacco: Never Used   Substance Use Topics   • Alcohol use: No     Alcohol/week: 0.0 oz     Frequency: Never     Binge frequency: Never   • Drug use: Not Currently        Family History:   family history includes Cancer in her father and paternal grandmother; Diabetes in her mother and sister; Heart Disease in her maternal grandmother and sister; Heart Failure in her mother; Lung Cancer in her father; No Known Problems in her brother; Sleep Apnea in her brother and sister.      PHYSICAL EXAM:   OBJECTIVE:  Vital signs: There were no vitals taken for this visit.  GENERAL: Well-developed, well-nourished in no apparent distress.   EYE:  No ocular asymmetry, PERRLA  HENT: Pink, moist mucous membranes.    NECK: No thyromegaly.   CARDIOVASCULAR:  No murmurs  LUNGS: Clear breath sounds  ABDOMEN: Soft, nontender   EXTREMITIES: No clubbing, cyanosis, or edema.   NEUROLOGICAL: No gross focal motor abnormalities   LYMPH: No cervical adenopathy seen  SKIN: No rashes, lesions.     Labs:  Lab Results   Component Value Date/Time    HBA1C 5.9 (H) 12/15/2020 06:05 AM        Lab Results   Component Value Date/Time    WBC 5.4 12/15/2020 06:05 AM    RBC 4.89 12/15/2020  06:05 AM    HEMOGLOBIN 13.8 12/15/2020 06:05 AM    MCV 87.1 12/15/2020 06:05 AM    MCH 28.2 12/15/2020 06:05 AM    MCHC 32.4 (L) 12/15/2020 06:05 AM    RDW 40.3 12/15/2020 06:05 AM    MPV 12.0 12/15/2020 06:05 AM       Lab Results   Component Value Date/Time    SODIUM 138 12/15/2020 06:05 AM    POTASSIUM 3.5 (L) 12/15/2020 06:05 AM    CHLORIDE 103 12/15/2020 06:05 AM    CO2 26 12/15/2020 06:05 AM    ANION 9.0 12/15/2020 06:05 AM    GLUCOSE 110 (H) 12/15/2020 06:05 AM    BUN 18 12/15/2020 06:05 AM    CREATININE 0.97 12/15/2020 06:05 AM    CALCIUM 9.7 12/15/2020 06:05 AM    ASTSGOT 19 12/15/2020 06:05 AM    ALTSGPT 33 12/15/2020 06:05 AM    TBILIRUBIN 0.7 12/15/2020 06:05 AM    ALBUMIN 4.1 12/15/2020 06:05 AM    TOTPROTEIN 6.9 12/15/2020 06:05 AM    GLOBULIN 2.8 12/15/2020 06:05 AM    AGRATIO 1.5 12/15/2020 06:05 AM       Lab Results   Component Value Date/Time    CHOLSTRLTOT 182 09/04/2020 0703    TRIGLYCERIDE 121 09/04/2020 0703    HDL 44 09/04/2020 0703     (H) 09/04/2020 0703       Lab Results   Component Value Date/Time    MALBCRT 17 12/15/2020 06:05 AM    MICROALBUR 3.3 12/15/2020 06:05 AM        Lab Results   Component Value Date/Time    TSHULTRASEN 0.681 12/15/2020 0605     No results found for: FREEDIR  No results found for: FREET3  No results found for: THYSTIMIG        ASSESSMENT/PLAN:     1. Controlled type 2 diabetes mellitus without complication, without long-term current use of insulin (HCC)  Controlled continue Metformin 850 mg twice a day and start Jardiance 10 mg daily to help with weight loss reviewed side effects and benefits of this medication advised patient come to the office to get a coupon to help save money.  Advised to remain well-hydrated.  We will see again pharmacy repeat of A1c    2. Postoperative hypothyroidism  Well-controlled TSH is optimal  Continue levothyroxine 100 mcg daily repeat TSH levels again in 4 to 6 months    3. Essential hypertension  Well-controlled continue  current medications    4. Dyslipidemia  Unstable continue atorvastatin  we will check fasting lipids with next labs in 4 months      Return in about 4 months (around 4/22/2021).      This patient during there office visit today was started on a new medication.  Side effects of the new medication were discussed with the patient today in the office.     Thank you kindly for allowing me to participate in the diabetes care plan for this patient.    Magnus Bourne MD, [facility], [facility]  12/22/20    CC:   MIRTA Dye.

## 2020-12-22 NOTE — PROGRESS NOTES
Requested Prescriptions     Signed Prescriptions Disp Refills   • LORazepam (ATIVAN) 1 MG Tab 180 Tab 0     Sig: Take 1 Tab by mouth every 8 hours as needed for Anxiety for up to 60 days.     Authorizing Provider: KEVIN DIEZ A.P.R.N.

## 2020-12-23 ENCOUNTER — IMMUNIZATION (OUTPATIENT)
Dept: FAMILY PLANNING/WOMEN'S HEALTH CLINIC | Facility: IMMUNIZATION CENTER | Age: 60
End: 2020-12-23
Payer: COMMERCIAL

## 2020-12-23 DIAGNOSIS — Z23 ENCOUNTER FOR VACCINATION: Primary | ICD-10-CM

## 2021-01-12 ENCOUNTER — IMMUNIZATION (OUTPATIENT)
Dept: FAMILY PLANNING/WOMEN'S HEALTH CLINIC | Facility: IMMUNIZATION CENTER | Age: 61
End: 2021-01-12
Attending: FAMILY MEDICINE
Payer: COMMERCIAL

## 2021-01-12 DIAGNOSIS — Z23 ENCOUNTER FOR VACCINATION: Primary | ICD-10-CM

## 2021-01-12 PROCEDURE — 0002A PFIZER SARS-COV-2 VACCINE: CPT

## 2021-01-12 PROCEDURE — 91300 PFIZER SARS-COV-2 VACCINE: CPT

## 2021-03-01 DIAGNOSIS — E11.9 CONTROLLED TYPE 2 DIABETES MELLITUS WITHOUT COMPLICATION, WITHOUT LONG-TERM CURRENT USE OF INSULIN (HCC): ICD-10-CM

## 2021-03-01 DIAGNOSIS — I10 ESSENTIAL HYPERTENSION: ICD-10-CM

## 2021-03-01 NOTE — TELEPHONE ENCOUNTER
Cali Arias,    This patient would like to transition to the [facility]. Would you please sign the prescription?    Thanks,  Osiris

## 2021-03-02 ENCOUNTER — PHARMACY VISIT (OUTPATIENT)
Dept: PHARMACY | Facility: MEDICAL CENTER | Age: 61
End: 2021-03-02
Payer: COMMERCIAL

## 2021-03-02 PROCEDURE — RXMED WILLOW AMBULATORY MEDICATION CHARGE: Performed by: INTERNAL MEDICINE

## 2021-03-02 RX ORDER — EMPAGLIFLOZIN 10 MG/1
1 TABLET, FILM COATED ORAL DAILY
Qty: 30 TABLET | Refills: 6 | Status: SHIPPED | OUTPATIENT
Start: 2021-03-02 | End: 2021-08-30 | Stop reason: SDUPTHER

## 2021-03-09 ENCOUNTER — TELEPHONE (OUTPATIENT)
Dept: MEDICAL GROUP | Facility: PHYSICIAN GROUP | Age: 61
End: 2021-03-09

## 2021-03-09 NOTE — TELEPHONE ENCOUNTER
Called pt to reschedule due to Jamee being out of the office. She needs refills for meds that end this week. Please call.

## 2021-03-10 NOTE — TELEPHONE ENCOUNTER
Phone Number Called: [phone] (home)     Call outcome: Spoke to patient regarding message below.    Message: Pt was requesting her Xanax and Zolpidem refilled which isn't do till next month. Pt thought Jamee was out longer than she is. Pt has been advised we cannot refill medications at this time. Pt has been advised to contact Pharmacy or mychart us 5 days prior to running out. Pt acknowledged

## 2021-03-19 ENCOUNTER — HOSPITAL ENCOUNTER (OUTPATIENT)
Dept: LAB | Facility: MEDICAL CENTER | Age: 61
End: 2021-03-19
Attending: INTERNAL MEDICINE
Payer: COMMERCIAL

## 2021-03-19 DIAGNOSIS — E78.5 DYSLIPIDEMIA: ICD-10-CM

## 2021-03-19 DIAGNOSIS — E89.0 POSTOPERATIVE HYPOTHYROIDISM: ICD-10-CM

## 2021-03-19 DIAGNOSIS — E11.9 CONTROLLED TYPE 2 DIABETES MELLITUS WITHOUT COMPLICATION, WITHOUT LONG-TERM CURRENT USE OF INSULIN (HCC): ICD-10-CM

## 2021-03-19 DIAGNOSIS — I10 ESSENTIAL HYPERTENSION: ICD-10-CM

## 2021-03-19 LAB
ALBUMIN SERPL BCP-MCNC: 4.4 G/DL (ref 3.2–4.9)
ALBUMIN/GLOB SERPL: 1.5 G/DL
ALP SERPL-CCNC: 83 U/L (ref 30–99)
ALT SERPL-CCNC: 39 U/L (ref 2–50)
ANION GAP SERPL CALC-SCNC: 10 MMOL/L (ref 7–16)
AST SERPL-CCNC: 28 U/L (ref 12–45)
BILIRUB SERPL-MCNC: 1.1 MG/DL (ref 0.1–1.5)
BUN SERPL-MCNC: 19 MG/DL (ref 8–22)
CALCIUM SERPL-MCNC: 10.3 MG/DL (ref 8.5–10.5)
CHLORIDE SERPL-SCNC: 102 MMOL/L (ref 96–112)
CHOLEST SERPL-MCNC: 111 MG/DL (ref 100–199)
CO2 SERPL-SCNC: 27 MMOL/L (ref 20–33)
CREAT SERPL-MCNC: 1.07 MG/DL (ref 0.5–1.4)
CREAT UR-MCNC: 157.96 MG/DL
EST. AVERAGE GLUCOSE BLD GHB EST-MCNC: 123 MG/DL
FASTING STATUS PATIENT QL REPORTED: NORMAL
GLOBULIN SER CALC-MCNC: 3 G/DL (ref 1.9–3.5)
GLUCOSE SERPL-MCNC: 112 MG/DL (ref 65–99)
HBA1C MFR BLD: 5.9 % (ref 4–5.6)
HDLC SERPL-MCNC: 41 MG/DL
LDLC SERPL CALC-MCNC: 48 MG/DL
MICROALBUMIN UR-MCNC: 3.2 MG/DL
MICROALBUMIN/CREAT UR: 20 MG/G (ref 0–30)
POTASSIUM SERPL-SCNC: 3.7 MMOL/L (ref 3.6–5.5)
PROT SERPL-MCNC: 7.4 G/DL (ref 6–8.2)
SODIUM SERPL-SCNC: 139 MMOL/L (ref 135–145)
T4 FREE SERPL-MCNC: 1.59 NG/DL (ref 0.93–1.7)
TRIGL SERPL-MCNC: 108 MG/DL (ref 0–149)
TSH SERPL DL<=0.005 MIU/L-ACNC: 0.8 UIU/ML (ref 0.38–5.33)

## 2021-03-19 PROCEDURE — 83036 HEMOGLOBIN GLYCOSYLATED A1C: CPT

## 2021-03-19 PROCEDURE — 36415 COLL VENOUS BLD VENIPUNCTURE: CPT

## 2021-03-19 PROCEDURE — 80053 COMPREHEN METABOLIC PANEL: CPT

## 2021-03-19 PROCEDURE — 82043 UR ALBUMIN QUANTITATIVE: CPT

## 2021-03-19 PROCEDURE — 84443 ASSAY THYROID STIM HORMONE: CPT

## 2021-03-19 PROCEDURE — 80061 LIPID PANEL: CPT

## 2021-03-19 PROCEDURE — 84439 ASSAY OF FREE THYROXINE: CPT

## 2021-03-19 PROCEDURE — 82570 ASSAY OF URINE CREATININE: CPT

## 2021-03-23 ENCOUNTER — PATIENT MESSAGE (OUTPATIENT)
Dept: MEDICAL GROUP | Facility: PHYSICIAN GROUP | Age: 61
End: 2021-03-23

## 2021-03-23 DIAGNOSIS — F41.1 GENERALIZED ANXIETY DISORDER: ICD-10-CM

## 2021-03-23 DIAGNOSIS — G47.00 INSOMNIA, UNSPECIFIED TYPE: ICD-10-CM

## 2021-03-24 RX ORDER — LORAZEPAM 1 MG/1
1 TABLET ORAL EVERY 8 HOURS PRN
Qty: 180 TABLET | Refills: 0 | Status: SHIPPED | OUTPATIENT
Start: 2021-03-24 | End: 2021-06-08

## 2021-03-24 RX ORDER — ZOLPIDEM TARTRATE 10 MG/1
10 TABLET ORAL NIGHTLY PRN
Qty: 30 TABLET | Refills: 0 | Status: SHIPPED | OUTPATIENT
Start: 2021-03-24 | End: 2021-04-08 | Stop reason: SDUPTHER

## 2021-03-24 NOTE — PROGRESS NOTES
Requested Prescriptions     Signed Prescriptions Disp Refills   • zolpidem (AMBIEN) 10 MG Tab 30 tablet 0     Sig: Take 1 tablet by mouth at bedtime as needed for Sleep for up to 30 days.     Authorizing Provider: KEVIN DIEZ   • LORazepam (ATIVAN) 1 MG Tab 180 tablet 0     Sig: Take 1 tablet by mouth every 8 hours as needed for Anxiety for up to 60 days.     Authorizing Provider: KEVIN DIEZ A.P.R.N.

## 2021-03-24 NOTE — PATIENT COMMUNICATION
Received request via: Patient Has been rescheduled twice this month needs medication refill    Was the patient seen in the last year in this department? Yes    Does the patient have an active prescription (recently filled or refills available) for medication(s) requested? No

## 2021-04-05 ENCOUNTER — PHARMACY VISIT (OUTPATIENT)
Dept: PHARMACY | Facility: MEDICAL CENTER | Age: 61
End: 2021-04-05
Payer: COMMERCIAL

## 2021-04-05 PROCEDURE — RXMED WILLOW AMBULATORY MEDICATION CHARGE: Performed by: INTERNAL MEDICINE

## 2021-04-08 ENCOUNTER — OFFICE VISIT (OUTPATIENT)
Dept: MEDICAL GROUP | Facility: PHYSICIAN GROUP | Age: 61
End: 2021-04-08
Payer: COMMERCIAL

## 2021-04-08 VITALS
TEMPERATURE: 97.3 F | OXYGEN SATURATION: 97 % | HEART RATE: 76 BPM | HEIGHT: 62 IN | WEIGHT: 163 LBS | BODY MASS INDEX: 30 KG/M2 | SYSTOLIC BLOOD PRESSURE: 110 MMHG | DIASTOLIC BLOOD PRESSURE: 60 MMHG

## 2021-04-08 DIAGNOSIS — E11.9 TYPE 2 DIABETES MELLITUS WITHOUT COMPLICATION, WITHOUT LONG-TERM CURRENT USE OF INSULIN (HCC): ICD-10-CM

## 2021-04-08 DIAGNOSIS — G47.00 INSOMNIA, UNSPECIFIED TYPE: ICD-10-CM

## 2021-04-08 DIAGNOSIS — F41.1 GENERALIZED ANXIETY DISORDER: ICD-10-CM

## 2021-04-08 DIAGNOSIS — I10 ESSENTIAL HYPERTENSION: ICD-10-CM

## 2021-04-08 PROCEDURE — 99214 OFFICE O/P EST MOD 30 MIN: CPT | Performed by: NURSE PRACTITIONER

## 2021-04-08 RX ORDER — ZOLPIDEM TARTRATE 10 MG/1
10 TABLET ORAL NIGHTLY PRN
Qty: 30 TABLET | Refills: 2 | Status: SHIPPED | OUTPATIENT
Start: 2021-04-24 | End: 2021-07-19 | Stop reason: SDUPTHER

## 2021-04-08 ASSESSMENT — PATIENT HEALTH QUESTIONNAIRE - PHQ9: CLINICAL INTERPRETATION OF PHQ2 SCORE: 0

## 2021-04-08 ASSESSMENT — FIBROSIS 4 INDEX: FIB4 SCORE: 1.06

## 2021-04-08 NOTE — ASSESSMENT & PLAN NOTE
Chronic medical problem. She is taking Ambien 10 mg nightly.  Her last dose was last night.  She is tolerating medication.  She does not wake up feeling confused or groggy in the morning.  She does not take at the same time as her lorazepam.  She is due for UDS today.  Controlled substance treatment agreement is on file.

## 2021-04-08 NOTE — ASSESSMENT & PLAN NOTE
Chronic medical problem.  She is taking enalapril-hydrochlorothiazide 10-25 mg daily.  Her blood pressure is at goal today.  She is not checking her blood pressure at home.  She denies any chest pain, shortness of breath, dizziness, or headaches.

## 2021-04-08 NOTE — ASSESSMENT & PLAN NOTE
Chronic medical problem.  She is taking jardiance 10 mg daily.  She has followed up with endocrinology.  She does not check her blood sugars.  Last lab results:  Results for FREDDY ARROYO (MRN [id number]) as of 4/8/2021 15:50   Ref. Range 3/19/2021 06:53   Glycohemoglobin Latest Ref Range: 4.0 - 5.6 % 5.9 (H)

## 2021-04-08 NOTE — PROGRESS NOTES
Subjective:     CC: Medication management    HPI:   Freddy presents today with the following:    Essential hypertension  Chronic medical problem.  She is taking enalapril-hydrochlorothiazide 10-25 mg daily.  Her blood pressure is at goal today.  She is not checking her blood pressure at home.  She denies any chest pain, shortness of breath, dizziness, or headaches.      Type 2 diabetes mellitus without complication, without long-term current use of insulin (HCC)  Chronic medical problem.  She is taking jardiance 10 mg daily.  She has followed up with endocrinology.  She does not check her blood sugars.  Last lab results:  Results for FREDDY ARROYO (MRN [id number]) as of 4/8/2021 15:50   Ref. Range 3/19/2021 06:53   Glycohemoglobin Latest Ref Range: 4.0 - 5.6 % 5.9 (H)       ARMEN (generalized anxiety disorder)  Chronic medical problem.  She is taking lorazepam 1 mg every 8 hours as needed.  She states that on average she takes at least twice a day.  Her last dose was yesterday afternoon.  She denies any self-harm or SI today.  She states that she has been on Lexapro in the past but did not like the way it made her feel.  She is due for UDS today.      Insomnia  Chronic medical problem. She is taking Ambien 10 mg nightly.  Her last dose was last night.  She is tolerating medication.  She does not wake up feeling confused or groggy in the morning.  She does not take at the same time as her lorazepam.  She is due for UDS today.  Controlled substance treatment agreement is on file.        Past Medical History:   Diagnosis Date   • Anxiety    • Breast cancer screening 10/9/2019     IMO load March 2020   • Chickenpox    • Dyslipidemia 9/16/2020   • Naur[place] measles    • Hypertension    • Hypothyroidism    • Nasal drainage    • 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    • Sleep apnea    • Thyroid disease    • Tonsillitis        Social History     Tobacco Use   • Smoking status: Never Smoker   •  "Smokeless tobacco: Never Used   Substance Use Topics   • Alcohol use: No     Alcohol/week: 0.0 oz   • Drug use: Not Currently       Current Outpatient Medications Ordered in Epic   Medication Sig Dispense Refill   • zolpidem (AMBIEN) 10 MG Tab Take 1 tablet by mouth at bedtime as needed for Sleep for up to 30 days. 30 tablet 0   • LORazepam (ATIVAN) 1 MG Tab Take 1 tablet by mouth every 8 hours as needed for Anxiety for up to 60 days. 180 tablet 0   • Empagliflozin (JARDIANCE) 10 MG Tab Take 1 tablet by mouth every day. 30 tablet 6   • enalapril-hydrochlorthiazide (VASERETIC) 10-25 MG per tablet TAKE ONE TABLET BY MOUTH ONCE DAILY 90 Tab 2   • Cholecalciferol (VITAMIN D3) 2000 UNIT Cap Take  by mouth every day.       No current [facility]-ordered facility-administered medications on file.       Allergies:  Codeine    Health Maintenance: Due for hepatitis C screening, zoster vaccine, Pap smear, and retinal screening.    ROS:  Gen: no fevers/chills, no changes in weight  Eyes: no changes in vision  Pulm: no shortness of breath  CV: no chest pain  GI: no nausea/vomiting  MSk: no myalgias  Skin: no rash  Neuro: no headaches, no dizziness    Objective:     Vital signs reviewed  Exam:  /60 (BP Location: Right arm, Patient Position: Sitting, BP Cuff Size: Adult)   Pulse 76   Temp 36.3 °C (97.3 °F) (Temporal)   Ht 1.575 m (5' 2\")   Wt 73.9 kg (163 lb)   SpO2 97%   BMI 29.81 kg/m²  Body mass index is 29.81 kg/m².    Gen: Alert and oriented, No apparent distress.  Neck: Neck is supple without lymphadenopathy.  Lungs: Normal effort, CTA bilaterally, no wheezes, rhonchi, or rales  CV: Regular rate and rhythm. No murmurs, rubs, or gallops.  Ext: No clubbing, cyanosis, edema.    Labs: UDS collected today    Assessment & Plan:     60 y.o. female with the following -     1. Type 2 diabetes mellitus without complication, without long-term current use of insulin (HCC)  Chronic stable problem.  Continue Jardiance.  Discussed " reviewed recent labs.  She has upcoming appointment at the end of the month with endocrinology.    2. Essential hypertension  Chronic stable problem.  Continue enalapril-hydrochlorothiazide.  Her blood pressure is at goal today.  Red flags discussed.    3. ARMEN (generalized anxiety disorder)  Chronic stable problem.  Continue lorazepam.  UDS completed today.  She is up-to-date on her agreement.  We did discuss daily maintenance medication versus chronic lorazepam use.  She states that she has some anxiety as she did not tolerate Lexapro in the past.  We did discuss referral to psychiatry, she declines today.  She like to continue with lorazepam and revisit discussion at upcoming appointment in 3 months.  I did discuss with her at length that I do not recommend long-term benzodiazepines in treatment of anxiety.  She verbalized understanding.  She would like to know if we could start giving her refills on her medication as she has anxiety about running out of her medication.  Discussed that her current prescription for lorazepam was filled on 3/24/2020 and it is for 60-day prescription, discussed that I will give her 1 refill before next upcoming appointment in 3 months, she verbalized understanding.  She denies any self-harm or SI today.  - Pain Management Screen; Future    4. Insomnia, unspecified type  Chronic stable problem.  Continue zolpidem.  UDS completed today.  She is up-to-date on her agreement.  We did discuss continuing sleep hygiene.  She will continue with her nightly CPAP.  She will return in 3 months for follow-up.  We did discuss including refills with her medications.  - Pain Management Screen; Future  - zolpidem (AMBIEN) 10 MG Tab; Take 1 tablet by mouth at bedtime as needed for Sleep for up to 30 days.  Dispense: 30 tablet; Refill: 2      Return in about 3 months (around 7/8/2021) for medication management .    Please note that this dictation was created using voice recognition software. I have  made every reasonable attempt to correct obvious errors, but I expect that there are errors of grammar and possibly content that I did not discover before finalizing the note.

## 2021-04-08 NOTE — ASSESSMENT & PLAN NOTE
Chronic medical problem.  She is taking lorazepam 1 mg every 8 hours as needed.  She states that on average she takes at least twice a day.  Her last dose was yesterday afternoon.  She denies any self-harm or SI today.  She states that she has been on Lexapro in the past but did not like the way it made her feel.  She is due for UDS today.

## 2021-04-29 PROCEDURE — RXMED WILLOW AMBULATORY MEDICATION CHARGE: Performed by: INTERNAL MEDICINE

## 2021-04-30 ENCOUNTER — OFFICE VISIT (OUTPATIENT)
Dept: ENDOCRINOLOGY | Facility: MEDICAL CENTER | Age: 61
End: 2021-04-30
Attending: INTERNAL MEDICINE
Payer: COMMERCIAL

## 2021-04-30 VITALS — WEIGHT: 158 LBS | HEIGHT: 62 IN | OXYGEN SATURATION: 95 % | BODY MASS INDEX: 29.08 KG/M2 | HEART RATE: 76 BPM

## 2021-04-30 DIAGNOSIS — Z79.84 LONG TERM (CURRENT) USE OF ORAL HYPOGLYCEMIC DRUGS: ICD-10-CM

## 2021-04-30 DIAGNOSIS — E89.0 POSTOPERATIVE HYPOTHYROIDISM: ICD-10-CM

## 2021-04-30 DIAGNOSIS — E11.9 CONTROLLED TYPE 2 DIABETES MELLITUS WITHOUT COMPLICATION, WITHOUT LONG-TERM CURRENT USE OF INSULIN (HCC): ICD-10-CM

## 2021-04-30 DIAGNOSIS — E78.5 DYSLIPIDEMIA: ICD-10-CM

## 2021-04-30 PROCEDURE — 99212 OFFICE O/P EST SF 10 MIN: CPT | Performed by: INTERNAL MEDICINE

## 2021-04-30 PROCEDURE — 99214 OFFICE O/P EST MOD 30 MIN: CPT | Performed by: INTERNAL MEDICINE

## 2021-04-30 RX ORDER — METFORMIN HYDROCHLORIDE 1000 MG/1
1 TABLET, FILM COATED, EXTENDED RELEASE ORAL 2 TIMES DAILY WITH MEALS
COMMUNITY
End: 2021-06-01 | Stop reason: SDUPTHER

## 2021-04-30 RX ORDER — LEVOTHYROXINE SODIUM 0.1 MG/1
100 TABLET ORAL
COMMUNITY
End: 2021-06-01 | Stop reason: SDUPTHER

## 2021-04-30 RX ORDER — ATORVASTATIN CALCIUM 20 MG/1
20 TABLET, FILM COATED ORAL NIGHTLY
COMMUNITY
End: 2021-06-01 | Stop reason: SDUPTHER

## 2021-04-30 ASSESSMENT — FIBROSIS 4 INDEX: FIB4 SCORE: 1.06

## 2021-04-30 NOTE — PROGRESS NOTES
CHIEF COMPLAINT: Patient is here for follow up of Type 2 Diabetes Mellitus.     HPI:     Koki Townsend is a 60 y.o. female with Type 2 Diabetes Mellitus here for follow up.    Labs from March 19, 2021 show A1c is 5.9%  Labs from 12/15/2020 show A1c was 5.9%      Her comorbid conditions include chronic kidney disease stage III, hyperlipidemia, hypertension, obesity and obstructive sleep apnea and history of anxiety.  She is on lorazepam that she takes to help her tolerate the CPAP mask.          On follow up she is on Metformin 850mg bid and Jardiance 10 mg daily    She reports well-controlled sugars and is now getting her medications through the [facility] pharmacy.  She currently does not need any refills      She has postsurgical hypothyroidism after undergoing a total thyroidectomy by by Dr. Hines many years ago with pathology returning as benign  She is currently on Levothyroxine 100mcg daily which has been her dose for 6-12 months    She reports good compliance  She denies symptoms of uncontrolled hypothyroidism such as constipation cold intolerance  TSH was normal 0.800 with a free T4 of 1.59 on March 19, 2021      She has hyperlipidemia and is on Atorvastatin  She is tolerating this well  Her LDL at baseline was 114 prior to statin therapy on 9/2020  Most recent LDL cholesterol is well controlled at 48 with triglycerides of 108 and HDL 41 and total cholesterol 111 on March 19, 2021        Her urine micral creatinine ratio was normal at 28 on March 2021  She is taking an ACE inhibitor along with hydrochlorothiazide for blood pressure control  Blood pressure today is well controlled  Serum creatinine was 1.07 with a GFR 52 on March 19, 2021        She gets her eye exam with Dr. Johnson and is scheduled for an eye exam in June but we do not have records of her eye exam from December 2020      She has a history of vitamin D deficiency and is taking vitamin D3 2000 units daily  Last vitamin D level was  adequate at 31 on April 2020          BG Diary:  She doesn't check her BG    Weight has been stable    Diabetes Complications   Retinopathy: No known retinopathy.  Last eye exam:  12/2020 with Dr. Saravia ( [facility]) none none HD retina  Neuropathy: Denies paresthesias or numbness in hands or feet. Denies any foot wounds.  Exercise: Minimal.  Diet: Fair.  Patient's medications, allergies, and social histories were reviewed and updated as appropriate.    ROS:     CONS:     No fever, no chills   EYES:     No diplopia, no blurry vision   CV:           No chest pain, no palpitations   PULM:     No SOB, no cough, no hemoptysis.   GI:            No nausea, no vomiting, no diarrhea, no constipation   ENDO:     No polyuria, no polydipsia, no heat intolerance, no cold intolerance       Past Medical History:  Problem List:  2020-09: Dyslipidemia  2020-07: Controlled type 2 diabetes mellitus without complication,   without long-term current use of insulin (HCC)  2020-07: Soft tissue mass  2020-06: Obesity (BMI 30-39.9)  2020-05: Elevated LFTs  2019-10: Breast cancer screening  2019-07: Anxiety  2018-01: ARMEN (generalized anxiety disorder)  2017-07: Hypothyroidism  2017-04: BMI 32.0-32.9,adult  2017-02: Insomnia  2017-02: Hypothyroidism  2017-01: Obstructive sleep apnea  2015-12: Impaired fasting blood sugar  2015-12: CKD (chronic kidney disease) stage 3, GFR 30-59 ml/min  2015-12: Other specified hypothyroidism  2015-12: Essential hypertension  2012-12: Health examination of defined subpopulation  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      Past Surgical History:  Past Surgical History:   Procedure Laterality Date   • SINUSCOPE     • THYROIDECTOMY Left 2000's    Left lobe removed   • TONSILLECTOMY     • TUBAL LIGATION          Allergies:  Codeine     Social History:  Social History     Tobacco Use   • Smoking status: Never Smoker   • Smokeless tobacco: Never Used   Substance Use Topics   • Alcohol use:  "No     Alcohol/week: 0.0 oz   • Drug use: Not Currently        Family History:   family history includes Cancer in her father and paternal grandmother; Diabetes in her mother and sister; Heart Disease in her maternal grandmother and sister; Heart Failure in her mother; Lung Cancer in her father; No Known Problems in her brother; Sleep Apnea in her brother and sister.      PHYSICAL EXAM:   OBJECTIVE:  Vital signs: Pulse 76   Ht 1.575 m (5' 2\")   Wt 71.7 kg (158 lb)   SpO2 95%   BMI 28.90 kg/m²   GENERAL: Well-developed, well-nourished in no apparent distress.   EYE:  No ocular asymmetry, PERRLA  HENT: Pink, moist mucous membranes.    NECK: No thyromegaly.   CARDIOVASCULAR:  No murmurs  LUNGS: Clear breath sounds  ABDOMEN: Soft, nontender   EXTREMITIES: No clubbing, cyanosis, or edema.   NEUROLOGICAL: No gross focal motor abnormalities   LYMPH: No cervical adenopathy seen  SKIN: No rashes, lesions.     Labs:  Lab Results   Component Value Date/Time    HBA1C 5.9 (H) 03/19/2021 06:53 AM        Lab Results   Component Value Date/Time    WBC 5.4 12/15/2020 06:05 AM    RBC 4.89 12/15/2020 06:05 AM    HEMOGLOBIN 13.8 12/15/2020 06:05 AM    MCV 87.1 12/15/2020 06:05 AM    MCH 28.2 12/15/2020 06:05 AM    MCHC 32.4 (L) 12/15/2020 06:05 AM    RDW 40.3 12/15/2020 06:05 AM    MPV 12.0 12/15/2020 06:05 AM       Lab Results   Component Value Date/Time    SODIUM 139 03/19/2021 06:53 AM    POTASSIUM 3.7 03/19/2021 06:53 AM    CHLORIDE 102 03/19/2021 06:53 AM    CO2 27 03/19/2021 06:53 AM    ANION 10.0 03/19/2021 06:53 AM    GLUCOSE 112 (H) 03/19/2021 06:53 AM    BUN 19 03/19/2021 06:53 AM    CREATININE 1.07 03/19/2021 06:53 AM    CALCIUM 10.3 03/19/2021 06:53 AM    ASTSGOT 28 03/19/2021 06:53 AM    ALTSGPT 39 03/19/2021 06:53 AM    TBILIRUBIN 1.1 03/19/2021 06:53 AM    ALBUMIN 4.4 03/19/2021 06:53 AM    TOTPROTEIN 7.4 03/19/2021 06:53 AM    GLOBULIN 3.0 03/19/2021 06:53 AM    AGRATIO 1.5 03/19/2021 06:53 AM       Lab Results "   Component Value Date/Time    CHOLSTRLTOT 182 09/04/2020 0703    TRIGLYCERIDE 121 09/04/2020 0703    HDL 44 09/04/2020 0703     (H) 09/04/2020 0703       Lab Results   Component Value Date/Time    MALBCRT 20 03/19/2021 06:53 AM    MICROALBUR 3.2 03/19/2021 06:53 AM        Lab Results   Component Value Date/Time    TSHULTRASEN 0.681 12/15/2020 0605     No results found for: FREEDIR  No results found for: FREET3  No results found for: THYSTIMIG        ASSESSMENT/PLAN:     1. Controlled type 2 diabetes mellitus without complication, without long-term current use of insulin (HCC)  Well-controlled continue current medications  Continue Metformin and Jardiance  Explained patient that we have room to go up on her medications if needed  Recommend that she get a copy of her eye exam  We will see her again in 6 months with repeat of her labs including A1c and other pertinent tests    2. Postoperative hypothyroidism  Controlled continue levothyroxine 100 mcg daily repeat TSH levels in 6 months    3. Dyslipidemia  Well-controlled continue atorvastatin repeat fasting lipids after 12 months    4. Long term (current) use of oral hypoglycemic drugs  Patient is on oral agents for type 2 diabetes management      Return in about 6 months (around 10/30/2021).      This patient during there office visit today was started on a new medication.  Side effects of the new medication were discussed with the patient today in the office.     Thank you kindly for allowing me to participate in the diabetes care plan for this patient.    Magnus Bourne MD, [facility], [facility]  12/22/20    CC:   DES Dye

## 2021-04-30 NOTE — PROGRESS NOTES
"RN-CDE Note    Subjective:   Endocrinology Clinic Progress Note  PCP: DES Dye    HPI:  Koki Townsend is a 60 y.o. old patient who is seen today for review of Type 2 Diabetes and Hyp[othyroidism.    DM:   Last A1c:   Lab Results   Component Value Date/Time    HBA1C 5.9 (H) 03/19/2021 06:53 AM      Previous A1c was 5.9 on 12/15/20  A1C GOAL: < 7    Diabetes Medications:   Jardiance 10 mg daily  Metformin 850 mg BID  Taking above medications as prescribed: yes  Taking daily ASA: No    Exercise: Walking and weights  Diet: \"healthy\" diet  in general  Patient's body mass index is 28.9 kg/m². Exercise and nutrition counseling were performed at this visit.    Glucose monitoring frequency: Not testing    Hypoglycemic episodes: no  Last Retinal Exam: May 2020 with Shieh  Daily Foot Exam: Yes   Foot Exam:  Monofilament: done  Monofilament testing with a 10 gram force: sensation intact: intact bilaterally  Visual Inspection: Feet without maceration, ulcers, fissures.  Pedal pulses: intact bilaterally   Lab Results   Component Value Date/Time    MALBCRT 20 03/19/2021 06:53 AM    MICROALBUR 3.2 03/19/2021 06:53 AM      ACR Albumin/Creatinine Ratio goal <30   Currently Rx ACE/ARB: Yes   Dyslipidemia:  Lab Results   Component Value Date/Time    CHOLSTRLTOT 111 03/19/2021 06:53 AM    LDL 48 03/19/2021 06:53 AM    HDL 41 03/19/2021 06:53 AM    TRIGLYCERIDE 108 03/19/2021 06:53 AM       Currently Rx Statin: Yes    She  reports that she has never smoked. She has never used smokeless tobacco.      Plan:     Discussed and educated on:   - All medications, side effects and compliance (discussed carefully)  - Annual eye examinations at Ophthalmology  - Home glucose monitoring emphasized  - Weight control and daily exercise    Recommended medication changes: No changes at this time.   "

## 2021-05-03 ENCOUNTER — PHARMACY VISIT (OUTPATIENT)
Dept: PHARMACY | Facility: MEDICAL CENTER | Age: 61
End: 2021-05-03
Payer: COMMERCIAL

## 2021-06-01 DIAGNOSIS — E55.9 VITAMIN D DEFICIENCY: ICD-10-CM

## 2021-06-01 DIAGNOSIS — E78.5 DYSLIPIDEMIA: ICD-10-CM

## 2021-06-01 DIAGNOSIS — E89.0 POSTOPERATIVE HYPOTHYROIDISM: ICD-10-CM

## 2021-06-01 DIAGNOSIS — E11.9 CONTROLLED TYPE 2 DIABETES MELLITUS WITHOUT COMPLICATION, WITHOUT LONG-TERM CURRENT USE OF INSULIN (HCC): ICD-10-CM

## 2021-06-01 DIAGNOSIS — I10 ESSENTIAL HYPERTENSION: ICD-10-CM

## 2021-06-01 RX ORDER — ACETAMINOPHEN 160 MG
1 TABLET,DISINTEGRATING ORAL DAILY
Qty: 90 CAPSULE | Refills: 2 | Status: SHIPPED | OUTPATIENT
Start: 2021-06-01 | End: 2022-02-08 | Stop reason: SDUPTHER

## 2021-06-01 RX ORDER — ATORVASTATIN CALCIUM 20 MG/1
20 TABLET, FILM COATED ORAL DAILY
Qty: 90 TABLET | Refills: 2 | Status: SHIPPED | OUTPATIENT
Start: 2021-06-01 | End: 2022-02-08 | Stop reason: SDUPTHER

## 2021-06-01 RX ORDER — LEVOTHYROXINE SODIUM 0.1 MG/1
100 TABLET ORAL
Qty: 90 TABLET | Refills: 2 | Status: SHIPPED | OUTPATIENT
Start: 2021-06-01 | End: 2022-02-08 | Stop reason: SDUPTHER

## 2021-06-01 RX ORDER — METFORMIN HYDROCHLORIDE 500 MG/1
TABLET, EXTENDED RELEASE ORAL 2 TIMES DAILY WITH MEALS
Qty: 720 TABLET | Refills: 2 | Status: SHIPPED | OUTPATIENT
Start: 2021-06-01 | End: 2022-08-02 | Stop reason: SDUPTHER

## 2021-06-02 DIAGNOSIS — I10 ESSENTIAL HYPERTENSION: ICD-10-CM

## 2021-06-02 PROCEDURE — RXMED WILLOW AMBULATORY MEDICATION CHARGE: Performed by: INTERNAL MEDICINE

## 2021-06-02 PROCEDURE — RXMED WILLOW AMBULATORY MEDICATION CHARGE: Performed by: NURSE PRACTITIONER

## 2021-06-02 RX ORDER — ENALAPRIL MALEATE AND HYDROCHLOROTHIAZIDE 10; 25 MG/1; MG/1
TABLET ORAL
Qty: 90 TABLET | Refills: 3 | Status: SHIPPED | OUTPATIENT
Start: 2021-06-02 | End: 2022-04-28 | Stop reason: SDUPTHER

## 2021-06-02 NOTE — PROGRESS NOTES
Requested Prescriptions     Signed Prescriptions Disp Refills   • enalapril-hydrochlorthiazide (VASERETIC) 10-25 MG per tablet 90 tablet 3     Sig: TAKE ONE TABLET BY MOUTH ONCE DAILY     Received staff message from Osiris Luz, endocrinology prescription coordinator, who states that patient is in need of refill of her enalapril.  Refill sent today.    MIRTA Dye.

## 2021-06-03 ENCOUNTER — PHARMACY VISIT (OUTPATIENT)
Dept: PHARMACY | Facility: MEDICAL CENTER | Age: 61
End: 2021-06-03
Payer: COMMERCIAL

## 2021-06-03 ENCOUNTER — OFFICE VISIT (OUTPATIENT)
Dept: SLEEP MEDICINE | Facility: MEDICAL CENTER | Age: 61
End: 2021-06-03
Payer: COMMERCIAL

## 2021-06-03 VITALS
RESPIRATION RATE: 16 BRPM | WEIGHT: 156 LBS | HEART RATE: 71 BPM | HEIGHT: 62 IN | BODY MASS INDEX: 28.71 KG/M2 | DIASTOLIC BLOOD PRESSURE: 60 MMHG | OXYGEN SATURATION: 93 % | SYSTOLIC BLOOD PRESSURE: 130 MMHG

## 2021-06-03 DIAGNOSIS — E66.9 OBESITY (BMI 30-39.9): ICD-10-CM

## 2021-06-03 DIAGNOSIS — G47.33 OBSTRUCTIVE SLEEP APNEA: Chronic | ICD-10-CM

## 2021-06-03 DIAGNOSIS — G47.00 INSOMNIA, UNSPECIFIED TYPE: ICD-10-CM

## 2021-06-03 DIAGNOSIS — Z78.9 NONSMOKER: ICD-10-CM

## 2021-06-03 PROCEDURE — 99214 OFFICE O/P EST MOD 30 MIN: CPT | Performed by: NURSE PRACTITIONER

## 2021-06-03 RX ORDER — ZOLPIDEM TARTRATE 10 MG/1
10 TABLET ORAL NIGHTLY PRN
COMMUNITY
End: 2021-07-19

## 2021-06-03 RX ORDER — LORAZEPAM 0.5 MG/1
0.5 TABLET ORAL EVERY 4 HOURS PRN
COMMUNITY
End: 2021-06-08

## 2021-06-03 ASSESSMENT — FIBROSIS 4 INDEX: FIB4 SCORE: 1.06

## 2021-06-03 NOTE — PROGRESS NOTES
Chief Complaint   Patient presents with   • Follow-Up     last seen 6/11/2020        HPI:  Koki Townsend is a 60 y.o. year old female here today for annual EVI f/u.   PMH includes RBBB, CKD, HTN, insomnia, hypothyroidism, ARMEN.   Last OV 6/11/20 with Brooks QUINTANILLA     PSG split-night 1/11/2017 indicates severe sleep apnea with an AHI of 94.6 and a minimum oxygen saturation of 77%. Titration study 2/28/17 noted best response to BiPAP 24/18 cm with a reduced AHI of 3.1/h and O2 peter 92%.  Small Jenny view mask was used.  Currently using BIPAP 21/16cm, biflex 3. Device obtained 2017.    She does have history of insomnia and currently using Ambien 10 mg nightly as needed and prescribed by primary care.  She tends fall asleep within 30min. She may wake up 2-3x's at night and falls back asleep quickly which may be due to mask.   Compliance report 5/4/21-6/2/21 notes 100% compliance, avg nigthly use of 8hr 21min, moderate mask leak of 12min with reduced AHI 4.9/hr. she tolerates mask and pressure well.  She denies morning headaches.  She notes consistent daytime energy levels.  She feels overall she sleeps on therapy.  She denies any cardiac or respiratory symptoms.    She notes in the last year she was diagnosed with DMII and placed metformin and jardiance. She is also now on cholesterol medication atorvastatin.       ROS: As per HPI and otherwise negative if not stated.    Past Medical History:   Diagnosis Date   • Anxiety    • Breast cancer screening 10/9/2019     IMO load March 2020   • Chickenpox    • Dyslipidemia 9/16/2020   • [place] measles    • Hypertension    • Hypothyroidism    • Nasal drainage    • 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    • Sleep apnea    • Thyroid disease    • Tonsillitis        Past Surgical History:   Procedure Laterality Date   • SINUSCOPE     • THYROIDECTOMY Left 2000's    Left lobe removed   • TONSILLECTOMY     • TUBAL LIGATION         Family History   Problem  Relation Age of Onset   • Heart Failure Mother    • Diabetes Mother    • Cancer Father         lung-abest   • Lung Cancer Father    • Diabetes Sister    • Sleep Apnea Sister    • Heart Disease Sister    • Sleep Apnea Brother    • No Known Problems Brother    • Heart Disease Maternal Grandmother         heart valve   • Cancer Paternal Grandmother         liver   • Stroke Neg Hx        Social History     Socioeconomic History   • Marital status: Single     Spouse name: Not on file   • Number of children: Not on file   • Years of education: Not on file   • Highest education level: Some college, no degree   Occupational History   • Not on file   Tobacco Use   • Smoking status: Never Smoker   • Smokeless tobacco: Never Used   Vaping Use   • Vaping Use: Never used   Substance and Sexual Activity   • Alcohol use: No     Alcohol/week: 0.0 oz   • Drug use: Not Currently   • Sexual activity: Not Currently     Partners: Male     Birth control/protection: Surgical   Other Topics Concern   • Not on file   Social History Narrative   • Not on file     Social Determinants of Health     Financial Resource Strain: Low Risk    • Difficulty of Paying Living Expenses: Not hard at all   Food Insecurity: No Food Insecurity   • Worried About Running Out of Food in the Last Year: Never true   • Ran Out of Food in the Last Year: Never true   Transportation Needs: No Transportation Needs   • Lack of Transportation (Medical): No   • Lack of Transportation (Non-Medical): No   Physical Activity: Sufficiently Active   • Days of Exercise per Week: 5 days   • Minutes of Exercise per Session: 40 min   Stress: No Stress Concern Present   • Feeling of Stress : Only a little   Social Connections: Socially Isolated   • Frequency of Communication with Friends and Family: More than three times a week   • Frequency of Social Gatherings with Friends and Family: Once a week   • Attends Zoroastrian Services: Never   • Active Member of Clubs or Organizations: No  "  • Attends Club or Organization Meetings: Never   • Marital Status:    Intimate Partner Violence:    • Fear of Current or Ex-Partner:    • Emotionally Abused:    • Physically Abused:    • Sexually Abused:        Allergies as of 06/03/2021 - Reviewed 06/03/2021   Allergen Reaction Noted   • Codeine Itching 12/18/2012        Vitals:  /60 (BP Location: Left arm, Patient Position: Sitting, BP Cuff Size: Adult)   Pulse 71   Resp 16   Ht 1.575 m (5' 2\")   Wt 70.8 kg (156 lb)   SpO2 93%     Current medications as of today   Current Outpatient Medications   Medication Sig Dispense Refill   • LORazepam (ATIVAN) 0.5 MG Tab Take 0.5 mg by mouth every four hours as needed for Anxiety.     • zolpidem (AMBIEN) 10 MG Tab Take 10 mg by mouth at bedtime as needed for Sleep.     • enalapril-hydrochlorthiazide (VASERETIC) 10-25 MG per tablet TAKE ONE TABLET BY MOUTH ONCE DAILY 90 tablet 3   • atorvastatin (LIPITOR) 20 MG Tab Take 1 tablet by mouth every day. 90 tablet 2   • Cholecalciferol (VITAMIN D3) 2000 UNIT Cap Take 1 capsule by mouth every day. 90 capsule 2   • levothyroxine (SYNTHROID) 100 MCG Tab Take 1 tablet by mouth every morning on an empty stomach. 90 tablet 2   • metFORMIN ER (GLUCOPHAGE XR) 500 MG TABLET SR 24 HR Take 2 tablets by mouth 2 times a day with meals. 720 tablet 2   • Empagliflozin (JARDIANCE) 10 MG Tab Take 1 tablet by mouth every day. 30 tablet 6     No current facility-administered medications for this visit.         Physical Exam:   Gen:           Alert and oriented, No apparent distress. Mood and affect appropriate, normal interaction with examiner.  Eyes:          PERRL, EOM intact, sclere white, conjunctive moist.  Ears:          Not examined.   Hearing:     Grossly intact.  Nose:          Normal, no lesions or deformities.  Dentition:    mask  Oropharynx:   mask  Mallampati Classification: mask  Neck:        Supple, trachea midline, no masses.  Respiratory Effort: No intercostal " retractions or use of accessory muscles.   Lung Auscultation:      Clear to auscultation bilaterally; no rales, rhonchi or wheezing.  CV:            Regular rate and rhythm. No murmurs, rubs or gallops.  Abd:           Not examined.   Lymphadenopathy: Not examined.  Gait and Station: Normal.  Digits and Nails: No clubbing, cyanosis, petechiae, or nodes.   Cranial Nerves: II-XII grossly intact.  Skin:        No rashes, lesions or ulcers noted.               Ext:           No cyanosis or edema.      Assessment:  1. Obstructive sleep apnea     2. Insomnia, unspecified type     3. Obesity (BMI 30-39.9)     4. BMI 28.0-28.9,adult     5. Nonsmoker         Immunizations:    Flu:9/2020  Pneumovax 23:2020  Prevnar 13:not due  COVID-19: 1/12/21, 12/22/20    Plan:  1.  EVI is clinically stable.  Continue BiPAP 21/16 cm nightly.  Patient's device will be 5 years old in 2022.  DME mask/supplies  2.  Discussed sleep hygiene.  3.  Encourage weight loss through diet and exercise  4.  Follow-up primary care for the health concerns  5.  Follow-up in 1 year with compliance report, sooner if needed.    Please note that this dictation was created using voice recognition software. I have made every reasonable attempt to correct obvious errors, but it is possible there are errors of grammar and possibly content that I did not discover before finalizing the note.

## 2021-06-08 DIAGNOSIS — F41.1 GENERALIZED ANXIETY DISORDER: ICD-10-CM

## 2021-06-08 RX ORDER — LORAZEPAM 1 MG/1
1 TABLET ORAL EVERY 8 HOURS PRN
Qty: 180 TABLET | Refills: 0 | Status: SHIPPED | OUTPATIENT
Start: 2021-06-08 | End: 2021-08-07

## 2021-06-09 NOTE — TELEPHONE ENCOUNTER
Requested Prescriptions     Signed Prescriptions Disp Refills   • LORazepam (ATIVAN) 1 MG Tab 180 tablet 0     Sig: Take 1 tablet by mouth every 8 hours as needed for Anxiety for up to 60 days.     Authorizing Provider: JAVIER BURKP reviewed today    MIRTA Dye.

## 2021-07-06 ENCOUNTER — PHARMACY VISIT (OUTPATIENT)
Dept: PHARMACY | Facility: MEDICAL CENTER | Age: 61
End: 2021-07-06
Payer: COMMERCIAL

## 2021-07-06 PROCEDURE — RXMED WILLOW AMBULATORY MEDICATION CHARGE: Performed by: INTERNAL MEDICINE

## 2021-07-19 ENCOUNTER — OFFICE VISIT (OUTPATIENT)
Dept: MEDICAL GROUP | Facility: PHYSICIAN GROUP | Age: 61
End: 2021-07-19
Payer: COMMERCIAL

## 2021-07-19 VITALS
SYSTOLIC BLOOD PRESSURE: 104 MMHG | TEMPERATURE: 97.8 F | BODY MASS INDEX: 29.63 KG/M2 | DIASTOLIC BLOOD PRESSURE: 68 MMHG | HEART RATE: 63 BPM | RESPIRATION RATE: 16 BRPM | OXYGEN SATURATION: 94 % | WEIGHT: 161 LBS | HEIGHT: 62 IN

## 2021-07-19 DIAGNOSIS — F41.1 GENERALIZED ANXIETY DISORDER: ICD-10-CM

## 2021-07-19 DIAGNOSIS — G47.00 INSOMNIA, UNSPECIFIED TYPE: Chronic | ICD-10-CM

## 2021-07-19 PROCEDURE — 99214 OFFICE O/P EST MOD 30 MIN: CPT | Performed by: NURSE PRACTITIONER

## 2021-07-19 RX ORDER — LORAZEPAM 1 MG/1
1 TABLET ORAL EVERY 8 HOURS PRN
Qty: 90 TABLET | Refills: 2 | Status: SHIPPED | OUTPATIENT
Start: 2021-08-05 | End: 2021-09-04

## 2021-07-19 RX ORDER — ZOLPIDEM TARTRATE 10 MG/1
10 TABLET ORAL NIGHTLY PRN
Qty: 30 TABLET | Refills: 2 | Status: SHIPPED | OUTPATIENT
Start: 2021-07-19 | End: 2021-08-18

## 2021-07-19 ASSESSMENT — FIBROSIS 4 INDEX: FIB4 SCORE: 1.06

## 2021-07-19 NOTE — ASSESSMENT & PLAN NOTE
Chronic medical problem. She is taking zolpidem 10 mg nightly PRN.  She does have to take almost nightly.  She does not wake up feeling groggy or drowsy in the morning.  She is up-to-date on her UDS and controlled substance treatment agreement.

## 2021-07-19 NOTE — PROGRESS NOTES
Subjective:     CC: Medication management    HPI:   Koki presents today with the following:    Insomnia  Chronic medical problem. She is taking zolpidem 10 mg nightly PRN.  She does have to take almost nightly.  She does not wake up feeling groggy or drowsy in the morning.  She is up-to-date on her UDS and controlled substance treatment agreement.    ARMEN (generalized anxiety disorder)  Chronic medical problem.  She continues with lorazepam 1 mg TID PRN.  She is up-to-date in her controlled substance treatment agreement and UDS.  She denies any self-harm or SI today.      Past Medical History:   Diagnosis Date   • Anxiety    • Breast cancer screening 10/9/2019     IMO load March 2020   • Chickenpox    • Dyslipidemia 9/16/2020   • [place] measles    • Hypertension    • Hypothyroidism    • Nasal drainage    • 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    • Sleep apnea    • Thyroid disease    • Tonsillitis        Social History     Tobacco Use   • Smoking status: Never Smoker   • Smokeless tobacco: Never Used   Vaping Use   • Vaping Use: Never used   Substance Use Topics   • Alcohol use: No     Alcohol/week: 0.0 oz   • Drug use: Not Currently       Current Outpatient Medications Ordered in Epic   Medication Sig Dispense Refill   • zolpidem (AMBIEN) 10 MG Tab Take 1 tablet by mouth at bedtime as needed for Sleep for up to 30 days. 30 tablet 2   • [START ON 8/5/2021] LORazepam (ATIVAN) 1 MG Tab Take 1 tablet by mouth every 8 hours as needed for Anxiety for up to 30 days. 90 tablet 2   • LORazepam (ATIVAN) 1 MG Tab Take 1 tablet by mouth every 8 hours as needed for Anxiety for up to 60 days. 180 tablet 0   • enalapril-hydrochlorthiazide (VASERETIC) 10-25 MG per tablet TAKE ONE TABLET BY MOUTH ONCE DAILY 90 tablet 3   • atorvastatin (LIPITOR) 20 MG Tab Take 1 tablet by mouth every day. 90 tablet 2   • Cholecalciferol (VITAMIN D3) 2000 UNIT Cap Take 1 capsule by mouth every day. 90 capsule 2   •  "levothyroxine (SYNTHROID) 100 MCG Tab Take 1 tablet by mouth every morning on an empty stomach. 90 tablet 2   • metFORMIN ER (GLUCOPHAGE XR) 500 MG TABLET SR 24 HR Take 2 tablets by mouth 2 times a day with meals. 720 tablet 2   • Empagliflozin (JARDIANCE) 10 MG Tab Take 1 tablet by mouth every day. 30 tablet 6     No current Epic-ordered facility-administered medications on file.       Allergies:  Codeine    Health Maintenance: Due for hepatitis C screening and zoster vaccine    ROS:  Gen: no fevers/chills, no changes in weight, denies any self-harm or SI today  Eyes: no changes in vision  Pulm: no shortness of breath  CV: no chest pain  GI: no nausea/vomiting  MSk: no myalgias  Skin: no rash  Neuro: no headaches, no dizziness    Objective:     Vital signs reviewed  Exam:  /68 (BP Location: Right arm, Patient Position: Sitting, BP Cuff Size: Adult)   Pulse 63   Temp 36.6 °C (97.8 °F) (Temporal)   Resp 16   Ht 1.575 m (5' 2\")   Wt 73 kg (161 lb)   SpO2 94%   BMI 29.45 kg/m²  Body mass index is 29.45 kg/m².    Gen: Alert and oriented, No apparent distress.  Neck: Neck is supple without lymphadenopathy.  Lungs: Normal effort, CTA bilaterally, no wheezes, rhonchi, or rales  CV: Regular rate and rhythm. No murmurs, rubs, or gallops.  Ext: No clubbing, cyanosis, edema.      Assessment & Plan:     60 y.o. female with the following -     1. Insomnia, unspecified type  Chronic stable problem.  She will continue with her Ambien.  We did discuss 30-day supply with 2 refills.  She is up-to-date on her agreements.  She will return in 3 months for follow-up.  - zolpidem (AMBIEN) 10 MG Tab; Take 1 tablet by mouth at bedtime as needed for Sleep for up to 30 days.  Dispense: 30 tablet; Refill: 2    2. ARMEN (generalized anxiety disorder)  Chronic stable problem.  She will continue with her lorazepam.  She recently picked up her 60-day supply on 6/8/2021.  We discussed starting a 30-day supply with 2 refills.  She is in " agreement.  Her first refill will start on 8/5/2021.  PDMP reviewed today.  She return in 3 months for follow-up.  She denies any self-harm or SI today.  - LORazepam (ATIVAN) 1 MG Tab; Take 1 tablet by mouth every 8 hours as needed for Anxiety for up to 30 days.  Dispense: 90 tablet; Refill: 2      Return in about 3 months (around 10/19/2021) for anxiety,depression .    Please note that this dictation was created using voice recognition software. I have made every reasonable attempt to correct obvious errors, but I expect that there are errors of grammar and possibly content that I did not discover before finalizing the note.

## 2021-07-19 NOTE — ASSESSMENT & PLAN NOTE
Chronic medical problem.  She continues with lorazepam 1 mg TID PRN.  She is up-to-date in her controlled substance treatment agreement and UDS.  She denies any self-harm or SI today.

## 2021-08-30 ENCOUNTER — PHARMACY VISIT (OUTPATIENT)
Dept: PHARMACY | Facility: MEDICAL CENTER | Age: 61
End: 2021-08-30
Payer: COMMERCIAL

## 2021-08-30 DIAGNOSIS — E11.9 CONTROLLED TYPE 2 DIABETES MELLITUS WITHOUT COMPLICATION, WITHOUT LONG-TERM CURRENT USE OF INSULIN (HCC): ICD-10-CM

## 2021-08-30 PROCEDURE — RXMED WILLOW AMBULATORY MEDICATION CHARGE: Performed by: NURSE PRACTITIONER

## 2021-08-30 PROCEDURE — RXMED WILLOW AMBULATORY MEDICATION CHARGE: Performed by: INTERNAL MEDICINE

## 2021-08-30 RX ORDER — EMPAGLIFLOZIN 10 MG/1
1 TABLET, FILM COATED ORAL DAILY
Qty: 90 TABLET | Refills: 3 | Status: SHIPPED | OUTPATIENT
Start: 2021-08-30 | End: 2022-08-02 | Stop reason: SDUPTHER

## 2021-09-27 ENCOUNTER — TELEPHONE (OUTPATIENT)
Dept: MEDICAL GROUP | Facility: PHYSICIAN GROUP | Age: 61
End: 2021-09-27

## 2021-09-27 NOTE — TELEPHONE ENCOUNTER
Phone Number Called: [phone] (home)     I spoke to the patient and told her, per EDUARDO Huerta, that there is no charge for the flu shot as it is considered preventive.  I reassured her that the appointment notes included mention of the flu shot.  She verbalized understanding and had no further questions.

## 2021-09-28 ENCOUNTER — IMMUNIZATION (OUTPATIENT)
Dept: OCCUPATIONAL MEDICINE | Facility: CLINIC | Age: 61
End: 2021-09-28
Payer: COMMERCIAL

## 2021-09-28 DIAGNOSIS — Z23 ENCOUNTER FOR VACCINATION: Primary | ICD-10-CM

## 2021-09-28 DIAGNOSIS — Z23 NEED FOR VACCINATION: Primary | ICD-10-CM

## 2021-09-28 PROCEDURE — 91300 PFIZER SARS-COV-2 VACCINE: CPT

## 2021-09-28 PROCEDURE — 90686 IIV4 VACC NO PRSV 0.5 ML IM: CPT | Performed by: NURSE PRACTITIONER

## 2021-09-28 PROCEDURE — 0003A PFIZER SARS-COV-2 VACCINE: CPT

## 2021-10-11 ENCOUNTER — OFFICE VISIT (OUTPATIENT)
Dept: MEDICAL GROUP | Facility: PHYSICIAN GROUP | Age: 61
End: 2021-10-11
Payer: COMMERCIAL

## 2021-10-11 VITALS
WEIGHT: 167 LBS | TEMPERATURE: 98.3 F | OXYGEN SATURATION: 93 % | HEIGHT: 62 IN | DIASTOLIC BLOOD PRESSURE: 70 MMHG | BODY MASS INDEX: 30.73 KG/M2 | HEART RATE: 68 BPM | SYSTOLIC BLOOD PRESSURE: 110 MMHG

## 2021-10-11 DIAGNOSIS — F41.1 GENERALIZED ANXIETY DISORDER: ICD-10-CM

## 2021-10-11 DIAGNOSIS — E11.9 CONTROLLED TYPE 2 DIABETES MELLITUS WITHOUT COMPLICATION, WITHOUT LONG-TERM CURRENT USE OF INSULIN (HCC): ICD-10-CM

## 2021-10-11 DIAGNOSIS — Z11.59 NEED FOR HEPATITIS C SCREENING TEST: ICD-10-CM

## 2021-10-11 DIAGNOSIS — G47.00 INSOMNIA, UNSPECIFIED TYPE: ICD-10-CM

## 2021-10-11 PROCEDURE — 99214 OFFICE O/P EST MOD 30 MIN: CPT | Performed by: NURSE PRACTITIONER

## 2021-10-11 RX ORDER — ZOLPIDEM TARTRATE 10 MG/1
TABLET ORAL
COMMUNITY
Start: 2021-09-24 | End: 2021-10-26

## 2021-10-11 RX ORDER — LORAZEPAM 1 MG/1
TABLET ORAL
COMMUNITY
Start: 2021-10-08 | End: 2022-06-13

## 2021-10-11 RX ORDER — FLUOXETINE HYDROCHLORIDE 20 MG/1
20 CAPSULE ORAL DAILY
Qty: 30 CAPSULE | Refills: 2 | Status: SHIPPED | OUTPATIENT
Start: 2021-10-11 | End: 2021-11-11 | Stop reason: SDUPTHER

## 2021-10-11 ASSESSMENT — ANXIETY QUESTIONNAIRES
4. TROUBLE RELAXING: SEVERAL DAYS
3. WORRYING TOO MUCH ABOUT DIFFERENT THINGS: SEVERAL DAYS
2. NOT BEING ABLE TO STOP OR CONTROL WORRYING: SEVERAL DAYS
5. BEING SO RESTLESS THAT IT IS HARD TO SIT STILL: SEVERAL DAYS
GAD7 TOTAL SCORE: 8
1. FEELING NERVOUS, ANXIOUS, OR ON EDGE: MORE THAN HALF THE DAYS
7. FEELING AFRAID AS IF SOMETHING AWFUL MIGHT HAPPEN: SEVERAL DAYS
6. BECOMING EASILY ANNOYED OR IRRITABLE: SEVERAL DAYS

## 2021-10-11 ASSESSMENT — FIBROSIS 4 INDEX: FIB4 SCORE: 1.06

## 2021-10-11 NOTE — ASSESSMENT & PLAN NOTE
Chronic medical problem. She is taking zolpidem 10 mg nightly as needed. She takes to help her sleep with her CPAP machine. She is tolerating the medication. She is due for updated controlled substance treatment agreement today.

## 2021-10-11 NOTE — ASSESSMENT & PLAN NOTE
Chronic medical problem. She reports increased family stress. She is taking lorazepam at least once a day up to 3 times a day. Her ARMEN score today is 8. She has tried Lexapro and Wellbutrin in the past. She is interested in starting a new medication but does not want weight gain. She denies any self-harm or SI today.

## 2021-10-11 NOTE — PROGRESS NOTES
Subjective:     CC: Anxiety and diabetes    HPI:   Koki presents today with the following:    Controlled type 2 diabetes mellitus without complication, without long-term current use of insulin (HCC)  Chronic medical problem.  She continues with empagliflozin 10 mg daily and Metformin ER 1000 mg twice daily with meals. She does not check her blood sugars. She has upcoming appointment scheduled with endocrinology on 11/5/2021 and does have labs ordered that she will complete prior to her upcoming appointment.    ARMEN (generalized anxiety disorder)  Chronic medical problem. She reports increased family stress. She is taking lorazepam at least once a day up to 3 times a day. Her ARMEN score today is 8. She has tried Lexapro and Wellbutrin in the past. She is interested in starting a new medication but does not want weight gain. She denies any self-harm or SI today.    Insomnia  Chronic medical problem. She is taking zolpidem 10 mg nightly as needed. She takes to help her sleep with her CPAP machine. She is tolerating the medication. She is due for updated controlled substance treatment agreement today.        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 30 Capsule 2   • Empagliflozin (JARDIANCE) 10 MG Tab Take 1 tablet  "by mouth every day. 90 Tablet 3   • enalapril-hydrochlorthiazide (VASERETIC) 10-25 MG per tablet TAKE ONE TABLET BY MOUTH ONCE DAILY 90 tablet 3   • atorvastatin (LIPITOR) 20 MG Tab Take 1 tablet by mouth every day. 90 tablet 2   • Cholecalciferol (VITAMIN D3) 2000 UNIT Cap Take 1 capsule by mouth every day. 90 capsule 2   • levothyroxine (SYNTHROID) 100 MCG Tab Take 1 tablet by mouth every morning on an empty stomach. 90 tablet 2   • metFORMIN ER (GLUCOPHAGE XR) 500 MG TABLET SR 24 HR Take 2 tablets by mouth 2 times a day with meals. 720 tablet 2   • LORazepam (ATIVAN) 1 MG Tab      • zolpidem (AMBIEN) 10 MG Tab        No current Epic-ordered facility-administered medications on file.       Allergies:  Codeine    Health Maintenance: Due for hepatitis C screening, zoster vaccine, A1c screening.    ROS:  Gen: no fevers/chills, no changes in weight, denies any self-harm or SI today  Pulm: no breath  CV: no chest pain, no palpitations  GI: no nausea/vomiting  MSk: no myalgias  Skin: no rash  Neuro: no headaches, no numbness/tingling      Objective:     Vital signs reviewed  Exam:  /70 (BP Location: Right arm, Patient Position: Sitting, BP Cuff Size: Adult)   Pulse 68   Temp 36.8 °C (98.3 °F) (Temporal)   Ht 1.575 m (5' 2\")   Wt 75.8 kg (167 lb)   SpO2 93%   BMI 30.54 kg/m²  Body mass index is 30.54 kg/m².    Gen: Alert and oriented, No apparent distress.  Neck: Neck is supple, full range of motion.  Lungs: Normal effort, no audible wheezes  CV: Skin pink, warm, dry.  Ext: No clubbing, cyanosis, edema.      Assessment & Plan:     60 y.o. female with the following -     1. ARMEN (generalized anxiety disorder)  Chronic exacerbated problem. Reviewed her armen score today. She is interested in starting daily maintenance medication as she is having to take her lorazepam daily. I reviewed the side effects of SSRIs and we discussed that fluoxetine shows the lowest side effect for weight gain. She will start on " fluoxetine 20 mg daily. She will continue with her lorazepam as needed. She is due for updated controlled substance treatment agreement today. She denies any self-harm or SI today. Red flags discussed. She will return for 1 month follow-up.  - Controlled Substance Treatment Agreement  - FLUoxetine (PROZAC) 20 MG Cap; Take 1 Capsule by mouth every day.  Dispense: 30 Capsule; Refill: 2    2. Controlled type 2 diabetes mellitus without complication, without long-term current use of insulin (HCC)  Chronic stable problem. She will continue with her empagliflozin and Metformin ER. She will complete her upcoming labs prior to her endocrinology appointment.    3. Insomnia, unspecified type  Chronic stable problem. She will continue with her zolpidem. PDMP reviewed today. Controlled substance treatment agreement updated today.  - Controlled Substance Treatment Agreement    4. Need for hepatitis C screening test  Acute uncomplicated problem. Screening indicated. Patient is in agreement. Orders placed. We discussed that she may complete her labs with her upcoming endocrinology lab draw.  - HCV Scrn ( [id number] [facility]); Future      Return in about 4 weeks (around 2021) for medication management.    Please note that this dictation was created using voice recognition software. I have made every reasonable attempt to correct obvious errors, but I expect that there are errors of grammar and possibly content that I did not discover before finalizing the note.

## 2021-10-11 NOTE — ASSESSMENT & PLAN NOTE
Chronic medical problem.  She continues with empagliflozin 10 mg daily and Metformin ER 1000 mg twice daily with meals. She does not check her blood sugars. She has upcoming appointment scheduled with endocrinology on 11/5/2021 and does have labs ordered that she will complete prior to her upcoming appointment.

## 2021-10-25 ENCOUNTER — HOSPITAL ENCOUNTER (OUTPATIENT)
Dept: LAB | Facility: MEDICAL CENTER | Age: 61
End: 2021-10-25
Attending: NURSE PRACTITIONER
Payer: COMMERCIAL

## 2021-10-25 ENCOUNTER — HOSPITAL ENCOUNTER (OUTPATIENT)
Dept: LAB | Facility: MEDICAL CENTER | Age: 61
End: 2021-10-25
Attending: INTERNAL MEDICINE
Payer: COMMERCIAL

## 2021-10-25 DIAGNOSIS — E89.0 POSTOPERATIVE HYPOTHYROIDISM: ICD-10-CM

## 2021-10-25 DIAGNOSIS — Z11.59 NEED FOR HEPATITIS C SCREENING TEST: ICD-10-CM

## 2021-10-25 DIAGNOSIS — G47.00 INSOMNIA, UNSPECIFIED TYPE: ICD-10-CM

## 2021-10-25 DIAGNOSIS — Z79.84 LONG TERM (CURRENT) USE OF ORAL HYPOGLYCEMIC DRUGS: ICD-10-CM

## 2021-10-25 DIAGNOSIS — E78.5 DYSLIPIDEMIA: ICD-10-CM

## 2021-10-25 DIAGNOSIS — E11.9 CONTROLLED TYPE 2 DIABETES MELLITUS WITHOUT COMPLICATION, WITHOUT LONG-TERM CURRENT USE OF INSULIN (HCC): ICD-10-CM

## 2021-10-25 LAB
25(OH)D3 SERPL-MCNC: 45 NG/ML (ref 30–100)
ALBUMIN SERPL BCP-MCNC: 4.3 G/DL (ref 3.2–4.9)
ALBUMIN/GLOB SERPL: 1.7 G/DL
ALP SERPL-CCNC: 99 U/L (ref 30–99)
ALT SERPL-CCNC: 37 U/L (ref 2–50)
ANION GAP SERPL CALC-SCNC: 12 MMOL/L (ref 7–16)
AST SERPL-CCNC: 26 U/L (ref 12–45)
BILIRUB SERPL-MCNC: 0.7 MG/DL (ref 0.1–1.5)
BUN SERPL-MCNC: 22 MG/DL (ref 8–22)
CALCIUM SERPL-MCNC: 9.8 MG/DL (ref 8.5–10.5)
CHLORIDE SERPL-SCNC: 105 MMOL/L (ref 96–112)
CO2 SERPL-SCNC: 24 MMOL/L (ref 20–33)
CREAT SERPL-MCNC: 1.1 MG/DL (ref 0.5–1.4)
EST. AVERAGE GLUCOSE BLD GHB EST-MCNC: 123 MG/DL
GLOBULIN SER CALC-MCNC: 2.5 G/DL (ref 1.9–3.5)
GLUCOSE SERPL-MCNC: 101 MG/DL (ref 65–99)
HBA1C MFR BLD: 5.9 % (ref 4–5.6)
HCV AB SER QL: NORMAL
POTASSIUM SERPL-SCNC: 3.6 MMOL/L (ref 3.6–5.5)
PROT SERPL-MCNC: 6.8 G/DL (ref 6–8.2)
SODIUM SERPL-SCNC: 141 MMOL/L (ref 135–145)
T4 FREE SERPL-MCNC: 1.82 NG/DL (ref 0.93–1.7)
TSH SERPL DL<=0.005 MIU/L-ACNC: 0.52 UIU/ML (ref 0.38–5.33)

## 2021-10-25 PROCEDURE — 83036 HEMOGLOBIN GLYCOSYLATED A1C: CPT

## 2021-10-25 PROCEDURE — 84443 ASSAY THYROID STIM HORMONE: CPT

## 2021-10-25 PROCEDURE — 36415 COLL VENOUS BLD VENIPUNCTURE: CPT

## 2021-10-25 PROCEDURE — G0472 HEP C SCREEN HIGH RISK/OTHER: HCPCS

## 2021-10-25 PROCEDURE — 82306 VITAMIN D 25 HYDROXY: CPT

## 2021-10-25 PROCEDURE — 80053 COMPREHEN METABOLIC PANEL: CPT

## 2021-10-25 PROCEDURE — 84439 ASSAY OF FREE THYROXINE: CPT

## 2021-10-26 RX ORDER — ZOLPIDEM TARTRATE 10 MG/1
TABLET ORAL
Qty: 30 TABLET | Refills: 0 | Status: SHIPPED | OUTPATIENT
Start: 2021-10-26 | End: 2021-11-23

## 2021-10-26 NOTE — TELEPHONE ENCOUNTER
Requested Prescriptions     Signed Prescriptions Disp Refills   • zolpidem (AMBIEN) 10 MG Tab 30 Tablet 0     Sig: TAKE ONE TABLET BY MOUTH AT BEDTIME AS NEEDED FOR SLEEP     Authorizing Provider: JAVIER BURK reviewed today    MIRTA Dye.

## 2021-11-05 ENCOUNTER — OFFICE VISIT (OUTPATIENT)
Dept: ENDOCRINOLOGY | Facility: MEDICAL CENTER | Age: 61
End: 2021-11-05
Attending: INTERNAL MEDICINE
Payer: COMMERCIAL

## 2021-11-05 VITALS
BODY MASS INDEX: 30.18 KG/M2 | WEIGHT: 164 LBS | DIASTOLIC BLOOD PRESSURE: 62 MMHG | OXYGEN SATURATION: 97 % | SYSTOLIC BLOOD PRESSURE: 110 MMHG | HEIGHT: 62 IN | HEART RATE: 76 BPM

## 2021-11-05 DIAGNOSIS — E11.9 CONTROLLED TYPE 2 DIABETES MELLITUS WITHOUT COMPLICATION, WITHOUT LONG-TERM CURRENT USE OF INSULIN (HCC): ICD-10-CM

## 2021-11-05 DIAGNOSIS — Z79.84 LONG TERM (CURRENT) USE OF ORAL HYPOGLYCEMIC DRUGS: ICD-10-CM

## 2021-11-05 DIAGNOSIS — E89.0 POSTOPERATIVE HYPOTHYROIDISM: ICD-10-CM

## 2021-11-05 DIAGNOSIS — E55.9 VITAMIN D DEFICIENCY: ICD-10-CM

## 2021-11-05 DIAGNOSIS — E78.5 DYSLIPIDEMIA: ICD-10-CM

## 2021-11-05 PROCEDURE — 99212 OFFICE O/P EST SF 10 MIN: CPT | Performed by: INTERNAL MEDICINE

## 2021-11-05 PROCEDURE — 99214 OFFICE O/P EST MOD 30 MIN: CPT | Performed by: INTERNAL MEDICINE

## 2021-11-05 ASSESSMENT — FIBROSIS 4 INDEX: FIB4 SCORE: 1.01

## 2021-11-05 NOTE — PROGRESS NOTES
CHIEF COMPLAINT: Patient is here for follow up of Type 2 Diabetes Mellitus.     HPI:     Koki Townsend is a 60 y.o. female with Type 2 Diabetes Mellitus here for follow up.    Labs from 10/25/2021 show a1c is 5.9%  Labs from  3/19/2021 show A1c was 5.9%  Labs from 12/15/2020 show A1c was 5.9%      Her comorbid conditions include chronic kidney disease stage III, hyperlipidemia, hypertension, obesity and obstructive sleep apnea and history of anxiety.  She is on lorazepam that she takes to help her tolerate the CPAP mask.        On follow up she is on Metformin ER 500mg bid and Jardiance 10 mg daily    She reports well-controlled sugars and is now getting her medications through the [facility] pharmacy.  She currently does not need any refills  Talked about the plate method of eating today because she is having problems with portion control  We did mention that she is a candidate for GLP-1 analog in the event that she continues to have problems with controlling her portions however I do not believe that starting her on a GLP-1 analog is necessary at this time because her A1c is really well controlled      She has postsurgical hypothyroidism after undergoing a total thyroidectomy by by Dr. Hines many years ago with pathology returning as benign  She is currently on Levothyroxine 100mcg daily which has been her dose for 12 months    She reports good compliance  She denies symptoms of uncontrolled hypothyroidism such as constipation cold intolerance  She also denies palpitations and tremors  TSH was normal 0.800 with a free T4 of 1.82 on 10/2021      She has hyperlipidemia and is on Atorvastatin  She is tolerating this well  Most recent LDL cholesterol is well controlled at 48 with triglycerides of 108 and HDL 41 and total cholesterol 111 on March 19, 2021        Her urine microalbumincreatinine ratio was normal at 28 on March 2021  She is taking an ACE inhibitor along with hydrochlorothiazide for blood pressure  control  Blood pressure today is well controlled  Serum creatinine was 1.07 with a GFR 52 on March 19, 2021        She gets her eye exam with Dr. Johnson and we have records      She has a history of vitamin D deficiency and is taking vitamin D3 2000 units daily  Last vitamin D level was adequate at 45 on 10/2021          BG Diary:  She doesn't check her BG    Weight has been stable    Diabetes Complications   Retinopathy: No known retinopathy.  Last eye exam:  6/2021 with Dr. Mendoza  Neuropathy: Denies paresthesias or numbness in hands or feet. Denies any foot wounds.  Exercise: Minimal.  Diet: Fair.  Patient's medications, allergies, and social histories were reviewed and updated as appropriate.    ROS:     CONS:     No fever, no chills   EYES:     No diplopia, no blurry vision   CV:           No chest pain, no palpitations   PULM:     No SOB, no cough, no hemoptysis.   GI:            No nausea, no vomiting, no diarrhea, no constipation   ENDO:     No polyuria, no polydipsia, no heat intolerance, no cold intolerance       Past Medical History:  Problem List:  2020-09: Dyslipidemia  2020-07: Controlled type 2 diabetes mellitus without complication,   without long-term current use of insulin ([facility])  2020-07: Soft tissue mass  2020-06: Obesity (BMI 30-39.9)  2020-05: Elevated LFTs  2019-10: Breast cancer screening  2019-07: Anxiety  2018-01: ARMEN (generalized anxiety disorder)  2017-07: Hypothyroidism  2017-04: BMI 32.0-32.9,adult  2017-02: Insomnia  2017-02: Hypothyroidism  2017-01: Obstructive sleep apnea  2015-12: Impaired fasting blood sugar  2015-12: CKD (chronic kidney disease) stage 3, GFR 30-59 ml/min ([facility])  2015-12: Other specified hypothyroidism  2015-12: Essential hypertension  2012-12: Health examination of defined subpopulation  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      Past Surgical History:  Past Surgical History:   Procedure Laterality Date   • SINUSCOPE     • THYROIDECTOMY  "Left 2000's    Left lobe removed   • TONSILLECTOMY     • TUBAL LIGATION          Allergies:  Codeine     Social History:  Social History     Tobacco Use   • Smoking status: Never Smoker   • Smokeless tobacco: Never Used   Vaping Use   • Vaping Use: Never used   Substance Use Topics   • Alcohol use: No     Alcohol/week: 0.0 oz   • Drug use: Not Currently        Family History:   family history includes Cancer in her father and paternal grandmother; Diabetes in her mother and sister; Heart Disease in her maternal grandmother and sister; Heart Failure in her mother; Lung Cancer in her father; No Known Problems in her brother; Sleep Apnea in her brother and sister.      PHYSICAL EXAM:   OBJECTIVE:  Vital signs: /62   Pulse 76   Ht 1.575 m (5' 2\")   Wt 74.4 kg (164 lb)   SpO2 97%   BMI 30.00 kg/m²   GENERAL: Well-developed, well-nourished in no apparent distress.   EYE:  No ocular asymmetry, PERRLA  HENT: Pink, moist mucous membranes.    NECK: No thyromegaly.   CARDIOVASCULAR:  No murmurs  LUNGS: Clear breath sounds  ABDOMEN: Soft, nontender   EXTREMITIES: No clubbing, cyanosis, or edema.   NEUROLOGICAL: No gross focal motor abnormalities   LYMPH: No cervical adenopathy seen  SKIN: No rashes, lesions.     Labs:  Lab Results   Component Value Date/Time    HBA1C 5.9 (H) 10/25/2021 06:16 AM        Lab Results   Component Value Date/Time    WBC 5.4 12/15/2020 06:05 AM    RBC 4.89 12/15/2020 06:05 AM    HEMOGLOBIN 13.8 12/15/2020 06:05 AM    MCV 87.1 12/15/2020 06:05 AM    MCH 28.2 12/15/2020 06:05 AM    MCHC 32.4 (L) 12/15/2020 06:05 AM    RDW 40.3 12/15/2020 06:05 AM    MPV 12.0 12/15/2020 06:05 AM       Lab Results   Component Value Date/Time    SODIUM 141 10/25/2021 06:16 AM    POTASSIUM 3.6 10/25/2021 06:16 AM    CHLORIDE 105 10/25/2021 06:16 AM    CO2 24 10/25/2021 06:16 AM    ANION 12.0 10/25/2021 06:16 AM    GLUCOSE 101 (H) 10/25/2021 06:16 AM    BUN 22 10/25/2021 06:16 AM    CREATININE 1.10 10/25/2021 " 06:16 AM    CALCIUM 9.8 10/25/2021 06:16 AM    ASTSGOT 26 10/25/2021 06:16 AM    ALTSGPT 37 10/25/2021 06:16 AM    TBILIRUBIN 0.7 10/25/2021 06:16 AM    ALBUMIN 4.3 10/25/2021 06:16 AM    TOTPROTEIN 6.8 10/25/2021 06:16 AM    GLOBULIN 2.5 10/25/2021 06:16 AM    AGRATIO 1.7 10/25/2021 06:16 AM       Lab Results   Component Value Date/Time    CHOLSTRLTOT 182 09/04/2020 0703    TRIGLYCERIDE 121 09/04/2020 0703    HDL 44 09/04/2020 0703     (H) 09/04/2020 0703       Lab Results   Component Value Date/Time    MALBCRT 20 03/19/2021 06:53 AM    MICROALBUR 3.2 03/19/2021 06:53 AM        Lab Results   Component Value Date/Time    TSHULTRASEN 0.681 12/15/2020 0605     No results found for: FREEDIR  No results found for: FREET3  No results found for: THYSTIMIG        ASSESSMENT/PLAN:     1. Controlled type 2 diabetes mellitus without complication, without long-term current use of insulin (HCC)  Well-controlled   Continue Metformin and Jardiance  Explained patient that we have room to go up on her medications if needed  Consider GLP-1 analog in the near future if she continues to have problems with weight gain and portion control  Recommend that she get a copy of her eye exam  We will see her again in 6 months with repeat of her labs including A1c and other pertinent tests    2. Postoperative hypothyroidism  Controlled   TSH is normal but free T4 is high due to lab assay issues  continue levothyroxine 100 mcg daily   repeat TSH levels in 6 months    3. Dyslipidemia  Well-controlled   continue atorvastatin   repeat fasting lipids after 6 months      4. Vitamin D deficiency  Controlled  Increase vitamin D to 4000  Repeat labs in 6 months      5. Long term (current) use of oral hypoglycemic drugs  Patient is on oral agents for type 2 diabetes management      Return in about 6 months (around 5/5/2022).      Thank you kindly for allowing me to participate in the diabetes care plan for this patient.    Magnus Bourne MD,  FACE, [facility]  12/22/20    CC:   DES Dye

## 2021-11-05 NOTE — PROGRESS NOTES
"RN-CDE Note    Subjective:   Endocrinology Clinic Progress Note  PCP: DES Dye    HPI:  Koki Townsend is a 60 y.o. old patient who is seen today for review of Type 2 Diabetes and postsurgical Hypothyroidism.  Recent changes in health: States went on an antidepressant and feeling better.  DM:   Last A1c:   Lab Results   Component Value Date/Time    HBA1C 5.9 (H) 10/25/2021 06:16 AM      Previous A1c was 5.9 on 3/19/21  A1C GOAL: < 7    Diabetes Medications:   Meformin  mg 2 BID  Jardiance 10 mg daily    Exercise: Walking  Diet: \"healthy\" diet  in general  Patient's body mass index is unknown because there is no height or weight on file. Exercise and nutrition counseling were performed at this visit.    Glucose monitoring frequency: Not testing blood sugars    Hypoglycemic episodes: no  Last Retinal Exam: on file and up-to-date  Daily Foot Exam: Yes   Foot Exam:  Monofilament: done  Monofilament testing with a 10 gram force: sensation intact: intact bilaterally  Visual Inspection: Feet without maceration, ulcers, fissures.  Pedal pulses: intact bilaterally   Lab Results   Component Value Date/Time    MALBCRT 20 03/19/2021 06:53 AM    MICROALBUR 3.2 03/19/2021 06:53 AM     She  reports that she has never smoked. She has never used smokeless tobacco.      Plan:     Discussed and educated on:   - All medications, side effects and compliance (discussed carefully)  - Annual eye examinations at Ophthalmology  - Home glucose monitoring emphasized  - Weight control and daily exercise    Recommended medication changes: No changes at this time.  She will try the plate method of eating.   "

## 2021-11-11 ENCOUNTER — TELEMEDICINE (OUTPATIENT)
Dept: MEDICAL GROUP | Facility: PHYSICIAN GROUP | Age: 61
End: 2021-11-11
Payer: COMMERCIAL

## 2021-11-11 VITALS
DIASTOLIC BLOOD PRESSURE: 60 MMHG | WEIGHT: 162 LBS | BODY MASS INDEX: 29.81 KG/M2 | SYSTOLIC BLOOD PRESSURE: 110 MMHG | HEIGHT: 62 IN

## 2021-11-11 DIAGNOSIS — F41.1 GENERALIZED ANXIETY DISORDER: ICD-10-CM

## 2021-11-11 DIAGNOSIS — G47.00 INSOMNIA, UNSPECIFIED TYPE: Chronic | ICD-10-CM

## 2021-11-11 PROBLEM — Z79.899 CONTROLLED SUBSTANCE AGREEMENT SIGNED: Status: ACTIVE | Noted: 2021-11-11

## 2021-11-11 PROCEDURE — 99214 OFFICE O/P EST MOD 30 MIN: CPT | Mod: 95 | Performed by: NURSE PRACTITIONER

## 2021-11-11 RX ORDER — FLUOXETINE HYDROCHLORIDE 20 MG/1
20 CAPSULE ORAL DAILY
Qty: 90 CAPSULE | Refills: 1 | Status: SHIPPED | OUTPATIENT
Start: 2021-11-11 | End: 2022-06-09 | Stop reason: SDUPTHER

## 2021-11-11 ASSESSMENT — ANXIETY QUESTIONNAIRES
4. TROUBLE RELAXING: NOT AT ALL
5. BEING SO RESTLESS THAT IT IS HARD TO SIT STILL: NOT AT ALL
1. FEELING NERVOUS, ANXIOUS, OR ON EDGE: SEVERAL DAYS
3. WORRYING TOO MUCH ABOUT DIFFERENT THINGS: NOT AT ALL
GAD7 TOTAL SCORE: 2
6. BECOMING EASILY ANNOYED OR IRRITABLE: NOT AT ALL
7. FEELING AFRAID AS IF SOMETHING AWFUL MIGHT HAPPEN: NOT AT ALL
2. NOT BEING ABLE TO STOP OR CONTROL WORRYING: SEVERAL DAYS

## 2021-11-11 ASSESSMENT — FIBROSIS 4 INDEX: FIB4 SCORE: 1.01

## 2021-11-11 NOTE — ASSESSMENT & PLAN NOTE
"Chronic medical problem. She is taking fluoxetine 20 mg daily. She has taken her Lorazepam once a day approximately twice a week. She states that she feels \"good\" and feels less anxious.  She has not noticed any new weight gain.  She denies any self-harm or SI today.  Her bakari score today is 2 and previously her score was 8.  "

## 2021-11-11 NOTE — ASSESSMENT & PLAN NOTE
Chronic medical problem. She continues with her zolpidem nightly.  The medication allows her to sleep at night and she does not wake up feeling groggy in the mornings.  She does not need a medication refill today.  She is updated her UDS and controlled substance treatment agreement.

## 2021-11-11 NOTE — PROGRESS NOTES
"Virtual Visit: Established Patient   This visit was conducted via Zoom using secure and encrypted videoconferencing technology.   The patient was in a private location in the state of [place].    The patient's identity was confirmed and verbal consent was obtained for this virtual visit.    Subjective:   CC:   Chief Complaint   Patient presents with   • Medication Management       Koki Townsend is a 60 y.o. female presenting for evaluation and management of:    ARMEN (generalized anxiety disorder)  Chronic medical problem. She is taking fluoxetine 20 mg daily. She has taken her Lorazepam once a day approximately twice a week. She states that she feels \"good\" and feels less anxious.  She has not noticed any new weight gain.  She denies any self-harm or SI today.  Her armen score today is 2 and previously her score was 8.    Insomnia  Chronic medical problem. She continues with her zolpidem nightly.  The medication allows her to sleep at night and she does not wake up feeling groggy in the mornings.  She does not need a medication refill today.  She is updated her UDS and controlled substance treatment agreement.      ROS   Per HPI    Current medicines (including changes today)  Current Outpatient Medications   Medication Sig Dispense Refill   • FLUoxetine (PROZAC) 20 MG Cap Take 1 Capsule by mouth every day. 90 Capsule 1   • zolpidem (AMBIEN) 10 MG Tab TAKE ONE TABLET BY MOUTH AT BEDTIME AS NEEDED FOR SLEEP 30 Tablet 0   • LORazepam (ATIVAN) 1 MG Tab      • Empagliflozin (JARDIANCE) 10 MG Tab Take 1 tablet by mouth every day. 90 Tablet 3   • enalapril-hydrochlorthiazide (VASERETIC) 10-25 MG per tablet TAKE ONE TABLET BY MOUTH ONCE DAILY 90 tablet 3   • atorvastatin (LIPITOR) 20 MG Tab Take 1 tablet by mouth every day. 90 tablet 2   • Cholecalciferol (VITAMIN D3) 2000 UNIT Cap Take 1 capsule by mouth every day. 90 capsule 2   • levothyroxine (SYNTHROID) 100 MCG Tab Take 1 tablet by mouth every morning on an empty stomach. " "90 tablet 2   • metFORMIN ER (GLUCOPHAGE XR) 500 MG TABLET SR 24 HR Take 2 tablets by mouth 2 times a day with meals. 720 tablet 2     No current facility-administered medications for this visit.       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  Skin: No rashes in visible areas.  Eye: Round. Conjunctiva clear, lids normal. No icterus.   ENMT: Lips pink without lesions, good dentition, moist mucous membranes. Phonation normal.  Neck: No masses, no thyromegaly. Moves freely without pain.  Respiratory: Unlabored respiratory effort, no cough or audible wheeze  Psych: Alert and oriented x3, normal affect and mood.     Assessment and Plan:   The following treatment plan was discussed:     1. ARMEN (generalized anxiety disorder)  Chronic stable problem.  Reviewed her armen score today.  She will continue with fluoxetine 20 mg daily.  She will continue with lorazepam as needed.  We discussed that she should try to use her lorazepam sparingly and only as needed.  She did have recent refill completed on 10/8/2021 for #90 tablets.  We discussed that when " she has 7-10 tablets left for her to request a medication refill.  I also discussed with her that with her next medication refill I would like to increase the frequency between her dosings to get her down to the lowest possible dose.  Anticipate next lorazepam prescription to be 1 mg twice daily as needed.  She verbalized understanding is in agreement.  She denies any self-harm or SI today.  Red flags discussed.  She will return to see me in 3 months.  - FLUoxetine (PROZAC) 20 MG Cap; Take 1 Capsule by mouth every day.  Dispense: 90 Capsule; Refill: 1    2. Insomnia, unspecified type  Chronic stable problem.  She is up-to-date on her UDS and controlled substance treatment agreement.  She will continue with her zolpidem.  We discussed to not take both her zolpidem and lorazepam together as these can increase the effects of each other increase her risk for respiratory depression.  She verbalized understanding.  We discussed that I would like to see her every 3 months for medication refills.  We discussed that with her next refill request I will add on refills as she will be see me every 3 months going forward.  She verbalized understanding.      Follow-up: Return in about 3 months (around 2/11/2022) for anxiety, insomnia.    Educated in proper administration of medication(s) ordered today including safety, possible SE, risks, benefits, rationale and alternatives to therapy.     Please note that this dictation was created using voice recognition software. I have made every reasonable attempt to correct obvious errors, but I expect that there are errors of grammar and possibly content that I did not discover before finalizing the note.

## 2021-11-19 PROCEDURE — RXMED WILLOW AMBULATORY MEDICATION CHARGE: Performed by: INTERNAL MEDICINE

## 2021-11-19 PROCEDURE — RXMED WILLOW AMBULATORY MEDICATION CHARGE: Performed by: NURSE PRACTITIONER

## 2021-11-22 ENCOUNTER — PHARMACY VISIT (OUTPATIENT)
Dept: PHARMACY | Facility: MEDICAL CENTER | Age: 61
End: 2021-11-22
Payer: COMMERCIAL

## 2021-11-22 DIAGNOSIS — G47.00 INSOMNIA, UNSPECIFIED TYPE: ICD-10-CM

## 2021-11-23 RX ORDER — ZOLPIDEM TARTRATE 10 MG/1
10 TABLET ORAL NIGHTLY PRN
Qty: 30 TABLET | Refills: 2 | Status: SHIPPED | OUTPATIENT
Start: 2021-11-23 | End: 2021-12-23

## 2021-11-23 NOTE — TELEPHONE ENCOUNTER
Requested Prescriptions     Signed Prescriptions Disp Refills   • zolpidem (AMBIEN) 10 MG Tab 30 Tablet 2     Sig: Take 1 Tablet by mouth at bedtime as needed for Sleep for up to 30 days.     Authorizing Provider: JAVIER BURKP reviewed today    MIRTA Dye.

## 2021-12-06 ENCOUNTER — PHARMACY VISIT (OUTPATIENT)
Dept: PHARMACY | Facility: MEDICAL CENTER | Age: 61
End: 2021-12-06
Payer: COMMERCIAL

## 2021-12-06 PROCEDURE — RXMED WILLOW AMBULATORY MEDICATION CHARGE: Performed by: NURSE PRACTITIONER

## 2022-02-08 DIAGNOSIS — E78.5 DYSLIPIDEMIA: ICD-10-CM

## 2022-02-08 DIAGNOSIS — E55.9 VITAMIN D DEFICIENCY: ICD-10-CM

## 2022-02-08 DIAGNOSIS — E89.0 POSTOPERATIVE HYPOTHYROIDISM: ICD-10-CM

## 2022-02-08 PROCEDURE — RXMED WILLOW AMBULATORY MEDICATION CHARGE: Performed by: NURSE PRACTITIONER

## 2022-02-08 PROCEDURE — RXMED WILLOW AMBULATORY MEDICATION CHARGE: Performed by: INTERNAL MEDICINE

## 2022-02-08 RX ORDER — ACETAMINOPHEN 160 MG
1 TABLET,DISINTEGRATING ORAL DAILY
Qty: 90 CAPSULE | Refills: 2 | Status: SHIPPED | OUTPATIENT
Start: 2022-02-08 | End: 2022-10-24 | Stop reason: SDUPTHER

## 2022-02-08 RX ORDER — LEVOTHYROXINE SODIUM 0.1 MG/1
100 TABLET ORAL
Qty: 90 TABLET | Refills: 2 | Status: SHIPPED | OUTPATIENT
Start: 2022-02-08 | End: 2022-10-24 | Stop reason: SDUPTHER

## 2022-02-08 RX ORDER — ATORVASTATIN CALCIUM 20 MG/1
20 TABLET, FILM COATED ORAL DAILY
Qty: 90 TABLET | Refills: 2 | Status: SHIPPED | OUTPATIENT
Start: 2022-02-08 | End: 2022-10-24 | Stop reason: SDUPTHER

## 2022-02-10 ENCOUNTER — PHARMACY VISIT (OUTPATIENT)
Dept: PHARMACY | Facility: MEDICAL CENTER | Age: 62
End: 2022-02-10
Payer: COMMERCIAL

## 2022-02-17 ENCOUNTER — TELEMEDICINE (OUTPATIENT)
Dept: MEDICAL GROUP | Facility: PHYSICIAN GROUP | Age: 62
End: 2022-02-17
Payer: COMMERCIAL

## 2022-02-17 VITALS — WEIGHT: 158 LBS | RESPIRATION RATE: 24 BRPM | BODY MASS INDEX: 29.08 KG/M2 | HEIGHT: 62 IN

## 2022-02-17 DIAGNOSIS — Z12.31 ENCOUNTER FOR SCREENING MAMMOGRAM FOR BREAST CANCER: ICD-10-CM

## 2022-02-17 DIAGNOSIS — G47.00 INSOMNIA, UNSPECIFIED TYPE: Chronic | ICD-10-CM

## 2022-02-17 DIAGNOSIS — F41.1 GENERALIZED ANXIETY DISORDER: ICD-10-CM

## 2022-02-17 PROCEDURE — 99214 OFFICE O/P EST MOD 30 MIN: CPT | Mod: 95 | Performed by: NURSE PRACTITIONER

## 2022-02-17 RX ORDER — ZOLPIDEM TARTRATE 10 MG/1
TABLET ORAL
COMMUNITY
Start: 2022-01-24 | End: 2022-02-23

## 2022-02-17 ASSESSMENT — ANXIETY QUESTIONNAIRES
7. FEELING AFRAID AS IF SOMETHING AWFUL MIGHT HAPPEN: NOT AT ALL
1. FEELING NERVOUS, ANXIOUS, OR ON EDGE: NOT AT ALL
GAD7 TOTAL SCORE: 2
6. BECOMING EASILY ANNOYED OR IRRITABLE: NOT AT ALL
2. NOT BEING ABLE TO STOP OR CONTROL WORRYING: SEVERAL DAYS
4. TROUBLE RELAXING: NOT AT ALL
5. BEING SO RESTLESS THAT IT IS HARD TO SIT STILL: NOT AT ALL
3. WORRYING TOO MUCH ABOUT DIFFERENT THINGS: SEVERAL DAYS

## 2022-02-17 ASSESSMENT — PATIENT HEALTH QUESTIONNAIRE - PHQ9: CLINICAL INTERPRETATION OF PHQ2 SCORE: 0

## 2022-02-17 ASSESSMENT — FIBROSIS 4 INDEX: FIB4 SCORE: 1.03

## 2022-02-18 NOTE — ASSESSMENT & PLAN NOTE
She continues with fluoxetine 20 mg daily. Before today she has not taken her lorazepam. Her last lorazepam dispense was 10/8/2021. She does not need a medication refill on her lorazepam. Her ARMEN score today is 0 and PHQ score is 2 today. Denies any self harm or SI today.

## 2022-02-18 NOTE — PROGRESS NOTES
Virtual Visit: Established Patient   This visit was conducted via Zoom using secure and encrypted videoconferencing technology.   The patient was in their home in the [facility].    The patient's identity was confirmed and verbal consent was obtained for this virtual visit.     Subjective:   CC:   Chief Complaint   Patient presents with   • Medication Management       Koki Townsend is a 61 y.o. female presenting for evaluation and management of:    Insomnia  She continues taking zolpidem 10 mg nightly as needed. She takes 30 minutes before bedtime. She does wake up feeling rested. The medication does not make her feel groggy. She is up to date on her controlled substance treatment agreement and UDS. UDS due 4/8/2022.    ARMEN (generalized anxiety disorder)  She continues with fluoxetine 20 mg daily. Before today she has not taken her lorazepam. Her last lorazepam dispense was 10/8/2021. She does not need a medication refill on her lorazepam. Her ARMEN score today is 0 and PHQ score is 2 today. Denies any self harm or SI today.       ROS   Per HPI    Current medicines (including changes today)  Current Outpatient Medications   Medication Sig Dispense Refill   • zolpidem (AMBIEN) 10 MG Tab      • atorvastatin (LIPITOR) 20 MG Tab Take 1 tablet by mouth every day. 90 Tablet 2   • Cholecalciferol (VITAMIN D3) 2000 UNIT Cap Take 1 capsule by mouth every day. 90 Capsule 2   • levothyroxine (SYNTHROID) 100 MCG Tab Take 1 tablet by mouth every morning on an empty stomach. 90 Tablet 2   • FLUoxetine (PROZAC) 20 MG Cap Take 1 Capsule by mouth every day. 90 Capsule 1   • Empagliflozin (JARDIANCE) 10 MG Tab Take 1 tablet by mouth every day. 90 Tablet 3   • enalapril-hydrochlorthiazide (VASERETIC) 10-25 MG per tablet TAKE ONE TABLET BY MOUTH ONCE DAILY 90 tablet 3   • metFORMIN ER (GLUCOPHAGE XR) 500 MG TABLET SR 24 HR Take 2 tablets by mouth 2 times a day with meals. 720 tablet 2   • LORazepam (ATIVAN) 1 MG Tab  (Patient not  "taking: Reported on 2/17/2022)       No current facility-administered medications for this visit.       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  Skin: No rashes in visible areas.  Eye: Round. Conjunctiva clear, lids normal. No icterus. Glasses in place.   ENMT: Lips pink without lesions, good dentition, moist mucous membranes. Phonation normal.  Neck: No masses, no thyromegaly. Moves freely without pain.  Respiratory: Unlabored respiratory effort, no cough or audible wheeze  Psych: Alert and oriented x3, normal affect and mood.     Assessment and Plan:   The following treatment plan was discussed:     1. Insomnia, unspecified type  Chronic stable problem.  She will continue with her zolpidem 10 mg nightly as needed.   reviewed today.  She will return in 3 months for an office visit for UDS.  She is up-to-date her controlled substance treatment agreement.    2. ARMEN (generalized anxiety disorder)  Chronic stable problem.  She will continue with her fluoxetine 20 mg daily.  Discussed and reviewed her armen score and " PHQ score today.  We discussed continuing to use her lorazepam sparingly but as needed if needed.  She denies any self-harm or SI today.  She denies any self-harm or SI today.  We discussed that if she does not need her lorazepam frequently next prescription refill will change to once daily as needed for #30.  She verbalized understanding and is in agreement.    3. Encounter for screening mammogram for breast cancer  Chronic stable problem.  Due for mammogram.  Contact information provided to patient to schedule mammogram.  - MA-SCREENING MAMMO BILAT W/TOMOSYNTHESIS W/CAD; Future      Follow-up: Return in about 3 months (around 5/17/2022) for medication management, UDS.    Educated in proper administration of medication(s) ordered today including safety, possible SE, risks, benefits, rationale and alternatives to therapy.     Please note that this dictation was created using voice recognition software. I have made every reasonable attempt to correct obvious errors, but I expect that there are errors of grammar and possibly content that I did not discover before finalizing the note.

## 2022-02-18 NOTE — ASSESSMENT & PLAN NOTE
She continues taking zolpidem 10 mg nightly as needed. She takes 30 minutes before bedtime. She does wake up feeling rested. The medication does not make her feel groggy. She is up to date on her controlled substance treatment agreement and UDS. UDS due 4/8/2022.

## 2022-02-22 DIAGNOSIS — F51.01 PRIMARY INSOMNIA: ICD-10-CM

## 2022-02-23 RX ORDER — ZOLPIDEM TARTRATE 10 MG/1
TABLET ORAL
Qty: 30 TABLET | Refills: 2 | Status: SHIPPED | OUTPATIENT
Start: 2022-02-23 | End: 2022-03-25

## 2022-02-23 NOTE — TELEPHONE ENCOUNTER
Requested Prescriptions     Signed Prescriptions Disp Refills   • zolpidem (AMBIEN) 10 MG Tab 30 Tablet 2     Sig: TAKE ONE TABLET BY MOUTH AT BEDTIME AS NEEDED for sleep     Authorizing Provider: JAVIER BURK PMP, A.P.R.N.

## 2022-03-07 ENCOUNTER — PHARMACY VISIT (OUTPATIENT)
Dept: PHARMACY | Facility: MEDICAL CENTER | Age: 62
End: 2022-03-07
Payer: COMMERCIAL

## 2022-03-07 PROCEDURE — RXMED WILLOW AMBULATORY MEDICATION CHARGE: Performed by: NURSE PRACTITIONER

## 2022-03-16 ENCOUNTER — HOSPITAL ENCOUNTER (OUTPATIENT)
Dept: RADIOLOGY | Facility: MEDICAL CENTER | Age: 62
End: 2022-03-16
Attending: NURSE PRACTITIONER
Payer: COMMERCIAL

## 2022-03-16 DIAGNOSIS — Z12.31 ENCOUNTER FOR SCREENING MAMMOGRAM FOR BREAST CANCER: ICD-10-CM

## 2022-03-16 PROCEDURE — 77063 BREAST TOMOSYNTHESIS BI: CPT

## 2022-04-11 ENCOUNTER — PHARMACY VISIT (OUTPATIENT)
Dept: PHARMACY | Facility: MEDICAL CENTER | Age: 62
End: 2022-04-11
Payer: COMMERCIAL

## 2022-04-11 PROCEDURE — RXMED WILLOW AMBULATORY MEDICATION CHARGE: Performed by: INTERNAL MEDICINE

## 2022-04-11 RX ORDER — BNT162B2 0.23 MG/2.25ML
INJECTION, SUSPENSION INTRAMUSCULAR
Qty: 0.3 ML | Refills: 0 | Status: SHIPPED | OUTPATIENT
Start: 2022-04-11 | End: 2022-05-19

## 2022-04-28 ENCOUNTER — TELEPHONE (OUTPATIENT)
Dept: PHARMACY | Facility: MEDICAL CENTER | Age: 62
End: 2022-04-28
Payer: COMMERCIAL

## 2022-04-28 DIAGNOSIS — I10 ESSENTIAL HYPERTENSION: ICD-10-CM

## 2022-04-28 PROCEDURE — RXMED WILLOW AMBULATORY MEDICATION CHARGE: Performed by: NURSE PRACTITIONER

## 2022-04-28 PROCEDURE — RXMED WILLOW AMBULATORY MEDICATION CHARGE: Performed by: INTERNAL MEDICINE

## 2022-04-28 RX ORDER — ENALAPRIL MALEATE AND HYDROCHLOROTHIAZIDE 10; 25 MG/1; MG/1
TABLET ORAL
Qty: 90 TABLET | Refills: 1 | Status: SHIPPED | OUTPATIENT
Start: 2022-04-28 | End: 2022-10-24 | Stop reason: SDUPTHER

## 2022-04-28 NOTE — TELEPHONE ENCOUNTER
Requested Prescriptions     Signed Prescriptions Disp Refills   • enalapril-hydrochlorthiazide (VASERETIC) 10-25 MG per tablet 90 Tablet 1     Sig: TAKE ONE TABLET BY MOUTH ONCE DAILY     Authorizing Provider: JAVIER BURK A.P.R.N.

## 2022-04-28 NOTE — TELEPHONE ENCOUNTER
Spoke to Yamileth, she is doing well. Jardiance and other medications are set for delivery to her home on 5/06, via [employer]. Copays will be charged to cc on file.

## 2022-05-03 ENCOUNTER — PHARMACY VISIT (OUTPATIENT)
Dept: PHARMACY | Facility: MEDICAL CENTER | Age: 62
End: 2022-05-03
Payer: COMMERCIAL

## 2022-05-09 ENCOUNTER — HOSPITAL ENCOUNTER (OUTPATIENT)
Dept: LAB | Facility: MEDICAL CENTER | Age: 62
End: 2022-05-09
Attending: INTERNAL MEDICINE
Payer: COMMERCIAL

## 2022-05-09 DIAGNOSIS — E89.0 POSTOPERATIVE HYPOTHYROIDISM: ICD-10-CM

## 2022-05-09 DIAGNOSIS — Z79.84 LONG TERM (CURRENT) USE OF ORAL HYPOGLYCEMIC DRUGS: ICD-10-CM

## 2022-05-09 DIAGNOSIS — E11.9 CONTROLLED TYPE 2 DIABETES MELLITUS WITHOUT COMPLICATION, WITHOUT LONG-TERM CURRENT USE OF INSULIN (HCC): ICD-10-CM

## 2022-05-09 DIAGNOSIS — E55.9 VITAMIN D DEFICIENCY: ICD-10-CM

## 2022-05-09 DIAGNOSIS — E78.5 DYSLIPIDEMIA: ICD-10-CM

## 2022-05-09 LAB
25(OH)D3 SERPL-MCNC: 55 NG/ML (ref 30–100)
ALBUMIN SERPL BCP-MCNC: 4.3 G/DL (ref 3.2–4.9)
ALBUMIN/GLOB SERPL: 1.5 G/DL
ALP SERPL-CCNC: 85 U/L (ref 30–99)
ALT SERPL-CCNC: 35 U/L (ref 2–50)
ANION GAP SERPL CALC-SCNC: 17 MMOL/L (ref 7–16)
AST SERPL-CCNC: 24 U/L (ref 12–45)
BILIRUB SERPL-MCNC: 1 MG/DL (ref 0.1–1.5)
BUN SERPL-MCNC: 19 MG/DL (ref 8–22)
CALCIUM SERPL-MCNC: 10 MG/DL (ref 8.5–10.5)
CHLORIDE SERPL-SCNC: 101 MMOL/L (ref 96–112)
CHOLEST SERPL-MCNC: 100 MG/DL (ref 100–199)
CO2 SERPL-SCNC: 23 MMOL/L (ref 20–33)
CREAT SERPL-MCNC: 1.09 MG/DL (ref 0.5–1.4)
CREAT UR-MCNC: 134.15 MG/DL
FASTING STATUS PATIENT QL REPORTED: NORMAL
GFR SERPLBLD CREATININE-BSD FMLA CKD-EPI: 58 ML/MIN/1.73 M 2
GLOBULIN SER CALC-MCNC: 2.8 G/DL (ref 1.9–3.5)
GLUCOSE SERPL-MCNC: 88 MG/DL (ref 65–99)
HDLC SERPL-MCNC: 41 MG/DL
LDLC SERPL CALC-MCNC: 40 MG/DL
MICROALBUMIN UR-MCNC: 4.7 MG/DL
MICROALBUMIN/CREAT UR: 35 MG/G (ref 0–30)
POTASSIUM SERPL-SCNC: 3.2 MMOL/L (ref 3.6–5.5)
PROT SERPL-MCNC: 7.1 G/DL (ref 6–8.2)
SODIUM SERPL-SCNC: 141 MMOL/L (ref 135–145)
T4 FREE SERPL-MCNC: 1.84 NG/DL (ref 0.93–1.7)
TRIGL SERPL-MCNC: 96 MG/DL (ref 0–149)
TSH SERPL DL<=0.005 MIU/L-ACNC: 0.22 UIU/ML (ref 0.38–5.33)

## 2022-05-09 PROCEDURE — 80053 COMPREHEN METABOLIC PANEL: CPT

## 2022-05-09 PROCEDURE — 36415 COLL VENOUS BLD VENIPUNCTURE: CPT

## 2022-05-09 PROCEDURE — 84439 ASSAY OF FREE THYROXINE: CPT

## 2022-05-09 PROCEDURE — 82570 ASSAY OF URINE CREATININE: CPT

## 2022-05-09 PROCEDURE — 84443 ASSAY THYROID STIM HORMONE: CPT

## 2022-05-09 PROCEDURE — 80061 LIPID PANEL: CPT

## 2022-05-09 PROCEDURE — 82043 UR ALBUMIN QUANTITATIVE: CPT

## 2022-05-09 PROCEDURE — 82306 VITAMIN D 25 HYDROXY: CPT

## 2022-05-13 ENCOUNTER — OFFICE VISIT (OUTPATIENT)
Dept: ENDOCRINOLOGY | Facility: MEDICAL CENTER | Age: 62
End: 2022-05-13
Attending: INTERNAL MEDICINE
Payer: COMMERCIAL

## 2022-05-13 VITALS
DIASTOLIC BLOOD PRESSURE: 62 MMHG | HEIGHT: 62 IN | OXYGEN SATURATION: 96 % | HEART RATE: 68 BPM | SYSTOLIC BLOOD PRESSURE: 108 MMHG | BODY MASS INDEX: 29.08 KG/M2 | WEIGHT: 158 LBS

## 2022-05-13 DIAGNOSIS — E89.0 POSTOPERATIVE HYPOTHYROIDISM: ICD-10-CM

## 2022-05-13 DIAGNOSIS — E78.5 DYSLIPIDEMIA: ICD-10-CM

## 2022-05-13 DIAGNOSIS — Z79.84 LONG TERM (CURRENT) USE OF ORAL HYPOGLYCEMIC DRUGS: ICD-10-CM

## 2022-05-13 DIAGNOSIS — E11.9 CONTROLLED TYPE 2 DIABETES MELLITUS WITHOUT COMPLICATION, WITHOUT LONG-TERM CURRENT USE OF INSULIN (HCC): ICD-10-CM

## 2022-05-13 DIAGNOSIS — E55.9 VITAMIN D DEFICIENCY: ICD-10-CM

## 2022-05-13 DIAGNOSIS — E87.6 HYPOKALEMIA: ICD-10-CM

## 2022-05-13 LAB
HBA1C MFR BLD: 5.8 % (ref 0–5.6)
INT CON NEG: ABNORMAL
INT CON POS: ABNORMAL

## 2022-05-13 PROCEDURE — RXMED WILLOW AMBULATORY MEDICATION CHARGE: Performed by: INTERNAL MEDICINE

## 2022-05-13 PROCEDURE — 83036 HEMOGLOBIN GLYCOSYLATED A1C: CPT | Performed by: INTERNAL MEDICINE

## 2022-05-13 PROCEDURE — 99214 OFFICE O/P EST MOD 30 MIN: CPT | Performed by: INTERNAL MEDICINE

## 2022-05-13 PROCEDURE — 99212 OFFICE O/P EST SF 10 MIN: CPT | Performed by: INTERNAL MEDICINE

## 2022-05-13 RX ORDER — POTASSIUM CHLORIDE 750 MG/1
10 TABLET, FILM COATED, EXTENDED RELEASE ORAL DAILY
Qty: 30 TABLET | Refills: 0 | Status: SHIPPED | OUTPATIENT
Start: 2022-05-13 | End: 2022-05-19

## 2022-05-13 ASSESSMENT — FIBROSIS 4 INDEX: FIB4 SCORE: 0.98

## 2022-05-13 NOTE — PROGRESS NOTES
"RN-CDE Note    Subjective:   Endocrinology Clinic Progress Note  PCP: DES Dye    HPI:  Koki Townsend is a 61 y.o. old patient who is seen today for review of Type 2 Diabetes.  Recent changes in health: Health good.  DM:   Last A1c:   Lab Results   Component Value Date/Time    HBA1C 5.8 (A) 05/13/2022 09:28 AM      Previous A1c was 5.9 on 10/25/21  A1C GOAL: < 6    Diabetes Medications:   Jardiance 10 mg daily  Metformin  mg 2 BID      Exercise: Walking 2 miles daily  Diet: \"healthy\" diet  in general.  Watching calories and has lost 6 pounds since last appointment.  Patient's body mass index is 28.9 kg/m². Exercise and nutrition counseling were performed at this visit.    Glucose monitoring frequency: Not testing     Hypoglycemic episodes: no  Last Retinal Exam: on file and up-to-date  Daily Foot Exam: Yes   Foot Exam:  Monofilament: done  Monofilament testing with a 10 gram force: sensation intact: intact bilaterally  Visual Inspection: Feet without maceration, ulcers, fissures.  Pedal pulses: intact bilaterally   Lab Results   Component Value Date/Time    MALBCRT 35 (H) 05/09/2022 06:39 AM    MICROALBUR 4.7 05/09/2022 06:39 AM     She  reports that she has never smoked. She has never used smokeless tobacco.      Plan:     Discussed and educated on:   - All medications, side effects and compliance (discussed carefully)  - Annual eye examinations at Ophthalmology  - Home glucose monitoring emphasized  - Weight control and daily exercise    Recommended medication changes: No changes in diabetes medications.  She will need to reduce her Levothyroxine   "

## 2022-05-13 NOTE — PROGRESS NOTES
CHIEF COMPLAINT: Patient is here for follow up of Type 2 Diabetes Mellitus.     HPI:     Koki Townsend is a 61 y.o. female with Type 2 Diabetes Mellitus here for follow up.    Labs from 5/13/2022 show a1c is 5.8%  Labs from 10/25/2021 show a1c was 5.9%  Labs from  3/19/2021 show A1c was 5.9%  Labs from 12/15/2020 show A1c was 5.9%      Her comorbid conditions include chronic kidney disease stage III, hyperlipidemia, hypertension, obesity and obstructive sleep apnea   and history of anxiety.  She has a CPAP        On follow up she is on   Metformin ER 500mg 2 pills bid and   Jardiance 10 mg daily    She denies SE       She has hyperlipidemia and is on Atorvastatin  She is tolerating this well  LDL-C was 40 on 5/9/2022      She has HTN that is controlled  She is taking an ACE inhibitor along with hydrochlorothiazide   She does have hypokalemia  Blood pressure today is well controlled  Previously she did not have microalbuminuria  However she now has mild microalbuminuria on retesting  I have recommended observation  UACR was 35 on 5/2022  UACR was 20 on 10/2021  Serum creatinine was 1.09 with a GFR 58 on May 2022      She gets her eye exam with Dr. 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 Hines  with pathology returning as benign  She is currently on Levothyroxine 100mcg daily which has been her dose for over 12 to 18 months  She reports good compliance  She denies symptoms of uncontrolled hypothyroidism such as constipation cold intolerance  She also denies palpitations and tremors    TSH is low at 0.220 on 5/9/2022  TSH was 0.520 on 10/2021  TSH was normal 0.800 with a free T4 of 1.82 on 10/2021        She has a history of vitamin D deficiency and is taking vitamin D3 2000 units daily  Last vitamin D level was adequate at 55 on May 2022        BG Diary:  She doesn't check her BG    Weight has been stable    Diabetes  Complications   Retinopathy: No known retinopathy.  Last eye exam:  1/2022 with Dr. Mendoza  Neuropathy: Denies paresthesias or numbness in hands or feet. Denies any foot wounds.  Exercise: Minimal.  Diet: Fair.  Patient's medications, allergies, and social histories were reviewed and updated as appropriate.    ROS:     CONS:     No fever, no chills   EYES:     No diplopia, no blurry vision   CV:           No chest pain, no palpitations   PULM:     No SOB, no cough, no hemoptysis.   GI:            No nausea, no vomiting, no diarrhea, no constipation   ENDO:     No polyuria, no polydipsia, no heat intolerance, no cold intolerance       Past Medical History:  Problem List:  2022-05: Hypokalemia  2021-11: Controlled substance agreement signed  2021-11: Long term (current) use of oral hypoglycemic drugs  2020-09: Dyslipidemia  2020-07: Controlled type 2 diabetes mellitus without complication,   without long-term current use of insulin ([facility])  2020-07: Soft tissue mass  2020-06: Obesity (BMI 30-39.9)  2020-05: Elevated LFTs  2019-10: Breast cancer screening  2019-07: Anxiety  2018-01: ARMEN (generalized anxiety disorder)  2017-07: Hypothyroidism  2017-04: BMI 32.0-32.9,adult  2017-02: Insomnia  2017-02: Hypothyroidism  2017-01: Obstructive sleep apnea  2015-12: Impaired fasting blood sugar  2015-12: CKD (chronic kidney disease) stage 3, GFR 30-59 ml/min ([facility])  2015-12: Other specified hypothyroidism  2015-12: Essential hypertension  2012-12: Health examination of defined subpopulation  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      Past Surgical History:  Past Surgical History:   Procedure Laterality Date   • SINUSCOPE     • THYROIDECTOMY Left 2000's    Left lobe removed   • TONSILLECTOMY     • TUBAL LIGATION          Allergies:  Codeine     Social History:  Social History     Tobacco Use   • Smoking status: Never Smoker   • Smokeless tobacco: Never Used   Vaping Use   • Vaping Use: Never used  "  Substance Use Topics   • Alcohol use: No     Alcohol/week: 0.0 oz   • Drug use: Not Currently        Family History:   family history includes Cancer in her father and paternal grandmother; Diabetes in her mother and sister; Heart Disease in her maternal grandmother and sister; Heart Failure in her mother; Lung Cancer in her father; No Known Problems in her brother; Sleep Apnea in her brother and sister.      PHYSICAL EXAM:   OBJECTIVE:  Vital signs: /62   Pulse 68   Ht 1.575 m (5' 2\")   Wt 71.7 kg (158 lb)   SpO2 96%   BMI 28.90 kg/m²   GENERAL: Well-developed, well-nourished in no apparent distress.   EYE:  No ocular asymmetry, PERRLA  HENT: Pink, moist mucous membranes.    NECK: No thyromegaly.   CARDIOVASCULAR:  No murmurs  LUNGS: Clear breath sounds  ABDOMEN: Soft, nontender   EXTREMITIES: No clubbing, cyanosis, or edema.   NEUROLOGICAL: No gross focal motor abnormalities   LYMPH: No cervical adenopathy seen  SKIN: No rashes, lesions.     Labs:  Lab Results   Component Value Date/Time    HBA1C 5.8 (A) 05/13/2022 09:28 AM        Lab Results   Component Value Date/Time    WBC 5.4 12/15/2020 06:05 AM    RBC 4.89 12/15/2020 06:05 AM    HEMOGLOBIN 13.8 12/15/2020 06:05 AM    MCV 87.1 12/15/2020 06:05 AM    MCH 28.2 12/15/2020 06:05 AM    MCHC 32.4 (L) 12/15/2020 06:05 AM    RDW 40.3 12/15/2020 06:05 AM    MPV 12.0 12/15/2020 06:05 AM       Lab Results   Component Value Date/Time    SODIUM 141 05/09/2022 06:39 AM    POTASSIUM 3.2 (L) 05/09/2022 06:39 AM    CHLORIDE 101 05/09/2022 06:39 AM    CO2 23 05/09/2022 06:39 AM    ANION 17.0 (H) 05/09/2022 06:39 AM    GLUCOSE 88 05/09/2022 06:39 AM    BUN 19 05/09/2022 06:39 AM    CREATININE 1.09 05/09/2022 06:39 AM    CALCIUM 10.0 05/09/2022 06:39 AM    ASTSGOT 24 05/09/2022 06:39 AM    ALTSGPT 35 05/09/2022 06:39 AM    TBILIRUBIN 1.0 05/09/2022 06:39 AM    ALBUMIN 4.3 05/09/2022 06:39 AM    TOTPROTEIN 7.1 05/09/2022 06:39 AM    GLOBULIN 2.8 05/09/2022 06:39 AM "    AGRATIO 1.5 05/09/2022 06:39 AM       Lab Results   Component Value Date/Time    CHOLSTRLTOT 182 09/04/2020 0703    TRIGLYCERIDE 121 09/04/2020 0703    HDL 44 09/04/2020 0703     (H) 09/04/2020 0703       Lab Results   Component Value Date/Time    MALBCRT 35 (H) 05/09/2022 06:39 AM    MICROALBUR 4.7 05/09/2022 06:39 AM        Lab Results   Component Value Date/Time    TSHULTRASEN 0.681 12/15/2020 0605     No results found for: FREEDIR  No results found for: FREET3  No results found for: THYSTIMIG        ASSESSMENT/PLAN:     1. Controlled type 2 diabetes mellitus without complication, without long-term current use of insulin (HCC)  Well-controlled   Continue Metformin and Jardiance  She is up to date with her labs  Recommend continued monitoring of microalbuminuria for now  We will see her again in 6 months with repeat of her A1c and urine albumin    2. Postoperative hypothyroidism  Uncontrolled  TSH is low  Free T4 is high  Adjust  levothyroxine 100 mcg daily BUT cut Sunday pill in 1/2  Repeat TSH levels in 6 months    3. Dyslipidemia  Well-controlled   Continue atorvastatin   Repeat fasting lipids after 12 months    4. Vitamin D deficiency  Controlled  Continue  vitamin D3 4000 IU daily  Repeat labs in 6 months    5. Long term (current) use of oral hypoglycemic drugs  Patient is on oral agents for type 2 diabetes management    6. Hypokalemia  Due to HCTZ  Start K dur 10meq daily  Repeat K in week   Follow-up with primary care      Return in about 6 months (around 11/13/2022).      Thank you kindly for allowing me to participate in the diabetes care plan for this patient.    Magnus Bourne MD, FACE, [facility]  12/22/20    CC:   LACEY DyeRROE

## 2022-05-15 ENCOUNTER — PATIENT MESSAGE (OUTPATIENT)
Dept: MEDICAL GROUP | Facility: PHYSICIAN GROUP | Age: 62
End: 2022-05-15
Payer: COMMERCIAL

## 2022-05-15 DIAGNOSIS — E11.9 CONTROLLED TYPE 2 DIABETES MELLITUS WITHOUT COMPLICATION, WITHOUT LONG-TERM CURRENT USE OF INSULIN (HCC): ICD-10-CM

## 2022-05-15 DIAGNOSIS — E89.0 POSTOPERATIVE HYPOTHYROIDISM: ICD-10-CM

## 2022-05-16 ENCOUNTER — HOSPITAL ENCOUNTER (OUTPATIENT)
Dept: LAB | Facility: MEDICAL CENTER | Age: 62
End: 2022-05-16
Attending: INTERNAL MEDICINE
Payer: COMMERCIAL

## 2022-05-16 ENCOUNTER — PHARMACY VISIT (OUTPATIENT)
Dept: PHARMACY | Facility: MEDICAL CENTER | Age: 62
End: 2022-05-16
Payer: COMMERCIAL

## 2022-05-16 DIAGNOSIS — E87.6 HYPOKALEMIA: ICD-10-CM

## 2022-05-16 DIAGNOSIS — E11.9 CONTROLLED TYPE 2 DIABETES MELLITUS WITHOUT COMPLICATION, WITHOUT LONG-TERM CURRENT USE OF INSULIN (HCC): ICD-10-CM

## 2022-05-16 LAB — POTASSIUM SERPL-SCNC: 3.8 MMOL/L (ref 3.6–5.5)

## 2022-05-16 PROCEDURE — 84132 ASSAY OF SERUM POTASSIUM: CPT

## 2022-05-16 PROCEDURE — 36415 COLL VENOUS BLD VENIPUNCTURE: CPT

## 2022-05-19 ENCOUNTER — OFFICE VISIT (OUTPATIENT)
Dept: MEDICAL GROUP | Facility: PHYSICIAN GROUP | Age: 62
End: 2022-05-19
Payer: COMMERCIAL

## 2022-05-19 VITALS
WEIGHT: 158 LBS | TEMPERATURE: 97.4 F | SYSTOLIC BLOOD PRESSURE: 130 MMHG | OXYGEN SATURATION: 94 % | DIASTOLIC BLOOD PRESSURE: 66 MMHG | HEART RATE: 84 BPM | HEIGHT: 62 IN | BODY MASS INDEX: 29.08 KG/M2

## 2022-05-19 DIAGNOSIS — I10 ESSENTIAL HYPERTENSION: ICD-10-CM

## 2022-05-19 DIAGNOSIS — G47.00 INSOMNIA, UNSPECIFIED TYPE: Chronic | ICD-10-CM

## 2022-05-19 DIAGNOSIS — E87.6 HYPOKALEMIA: ICD-10-CM

## 2022-05-19 DIAGNOSIS — F41.1 GENERALIZED ANXIETY DISORDER: ICD-10-CM

## 2022-05-19 PROBLEM — M79.89 SOFT TISSUE MASS: Status: RESOLVED | Noted: 2020-07-03 | Resolved: 2022-05-19

## 2022-05-19 PROCEDURE — 99214 OFFICE O/P EST MOD 30 MIN: CPT | Performed by: NURSE PRACTITIONER

## 2022-05-19 RX ORDER — ZOLPIDEM TARTRATE 10 MG/1
10 TABLET ORAL NIGHTLY PRN
COMMUNITY
End: 2022-05-24

## 2022-05-19 ASSESSMENT — FIBROSIS 4 INDEX: FIB4 SCORE: 0.98

## 2022-05-19 NOTE — ASSESSMENT & PLAN NOTE
Her last dose of lorazepam was 5/2/2022. She has not needed her lorazepam as frequently as before.  She is due for UDS today. She continues with her fluoxetine 20 mg daily. She does feel a difference since starting fluoxetine.  She denies any self-harm or SI today.

## 2022-05-19 NOTE — PROGRESS NOTES
Subjective:     CC: medication management    HPI:   Koki presents today with the following:    Insomnia  She continues with her zolpidem 10 mg nightly. She does wear nightly CPAP and taking the medication allows her to sleep throughout the night.  She does wake up feeling rested in the morning.  Her last dose was last night. Due for UDS today.  She is up to date on her controlled substance treatment agreement.    ARMEN (generalized anxiety disorder)  Her last dose of lorazepam was 5/2/2022. She has not needed her lorazepam as frequently as before.  She is due for UDS today. She continues with her fluoxetine 20 mg daily. She does feel a difference since starting fluoxetine.  She denies any self-harm or SI today.    Hypokalemia  Her recent labs from 5/16/2022 show a potassium level of 3.8 mmol/L. She states in the last 2 weeks she has increased her banana intake and peanut butter intake.  She did not start the potassium replacement that her endocrinologist had prescribed.  She is currently on enalapril-hydrochlorothiazide for blood pressure control.    Essential hypertension  Her initial blood pressure today is 140/78. She continues with her enalapril-hctz 10-25 mg daily.  She does not check her blood pressure at home.  No new chest pain, shortness of breath, dizziness, blurry vision or headache.      Past Medical History:   Diagnosis Date   • Anxiety    • Breast cancer screening 10/9/2019     IMO load March 2020   • Chickenpox    • Dyslipidemia 9/16/2020   • [place] measles    • Hypertension    • Hypothyroidism    • Nasal drainage    • 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    • Sleep apnea    • Soft tissue mass 7/3/2020   • Thyroid disease    • Tonsillitis        Social History     Tobacco Use   • Smoking status: Never Smoker   • Smokeless tobacco: Never Used   Vaping Use   • Vaping Use: Never used   Substance Use Topics   • Alcohol use: No     Alcohol/week: 0.0 oz   • Drug use: Not  "Currently       Current Outpatient Medications Ordered in Epic   Medication Sig Dispense Refill   • zolpidem (AMBIEN) 10 MG Tab Take 10 mg by mouth at bedtime as needed for Sleep.     • enalapril-hydrochlorthiazide (VASERETIC) 10-25 MG per tablet TAKE ONE TABLET BY MOUTH ONCE DAILY 90 Tablet 1   • atorvastatin (LIPITOR) 20 MG Tab Take 1 tablet by mouth every day. 90 Tablet 2   • Cholecalciferol (VITAMIN D3) 2000 UNIT Cap Take 1 capsule by mouth every day. 90 Capsule 2   • levothyroxine (SYNTHROID) 100 MCG Tab Take 1 tablet by mouth every morning on an empty stomach. 90 Tablet 2   • FLUoxetine (PROZAC) 20 MG Cap Take 1 Capsule by mouth every day. 90 Capsule 1   • LORazepam (ATIVAN) 1 MG Tab      • Empagliflozin (JARDIANCE) 10 MG Tab Take 1 tablet by mouth every day. 90 Tablet 3   • metFORMIN ER (GLUCOPHAGE XR) 500 MG TABLET SR 24 HR Take 2 tablets by mouth 2 times a day with meals. 720 tablet 2     No current Epic-ordered facility-administered medications on file.       Allergies:  Codeine    Health Maintenance: Deferred     Objective:     Vital signs reviewed  Exam:  /66 (BP Location: Left arm, Patient Position: Sitting)   Pulse 84   Temp 36.3 °C (97.4 °F) (Temporal)   Ht 1.575 m (5' 2\")   Wt 71.7 kg (158 lb)   SpO2 94%   BMI 28.90 kg/m²  Body mass index is 28.9 kg/m².    Gen: Alert and oriented, No apparent distress.  Lungs: Normal effort, CTA bilaterally, no wheezes, rhonchi, or rales  CV: Regular rate and rhythm. No murmurs, rubs, or gallops.  Ext: No clubbing, cyanosis, edema.      Assessment & Plan:     61 y.o. female with the following -     1. Insomnia, unspecified type  Chronic stable problem.  She will continue with her zolpidem 10 mg nightly as needed.  UDS collected in office today.   reviewed today.  She will return every 3 months for medication refills.  - Pain Management Screen; Future    2. ARMEN (generalized anxiety disorder)  Chronic stable problem.  She will continue the fluoxetine 20 " mg daily.  She will continue with lorazepam 1 mg every 8 hours as needed.   reviewed today.  UDS collected today.  Last dose was over a week and a half ago do not anticipate to be in her urine.  She denies any other controlled prescribed medications other than lorazepam and zolpidem.  - Pain Management Screen; Future    3. Hypokalemia  Chronic stable problem.  Repeat potassium was back to normal without any potassium replacement.  Encourage potassium rich foods.  Plan to recheck potassium level in 1 month.  If 1 month follow-up potassium is low we will start potassium chloride 10 M EQ daily.  She will continue with her enalapril-hydrochlorothiazide.  - POTASSIUM SERUM (K); Future    4. Essential hypertension  Chronic stable problem.  On repeat by me today her blood pressure is 130/66.  She will continue with her enalapril-hydrochlorothiazide 10-25 mg daily.      Return in about 3 months (around 8/19/2022) for medication management.    Please note that this dictation was created using voice recognition software. I have made every reasonable attempt to correct obvious errors, but I expect that there are errors of grammar and possibly content that I did not discover before finalizing the note.

## 2022-05-19 NOTE — ASSESSMENT & PLAN NOTE
Her initial blood pressure today is 140/78. She continues with her enalapril-hctz 10-25 mg daily.  She does not check her blood pressure at home.  No new chest pain, shortness of breath, dizziness, blurry vision or headache.

## 2022-05-19 NOTE — ASSESSMENT & PLAN NOTE
Her recent labs from 5/16/2022 show a potassium level of 3.8 mmol/L. She states in the last 2 weeks she has increased her banana intake and peanut butter intake.  She did not start the potassium replacement that her endocrinologist had prescribed.  She is currently on enalapril-hydrochlorothiazide for blood pressure control.

## 2022-05-19 NOTE — ASSESSMENT & PLAN NOTE
She continues with her zolpidem 10 mg nightly. She does wear nightly CPAP and taking the medication allows her to sleep throughout the night.  She does wake up feeling rested in the morning.  Her last dose was last night. Due for UDS today.  She is up to date on her controlled substance treatment agreement.

## 2022-05-24 DIAGNOSIS — G47.00 INSOMNIA, UNSPECIFIED TYPE: ICD-10-CM

## 2022-05-24 RX ORDER — ZOLPIDEM TARTRATE 10 MG/1
10 TABLET ORAL
Qty: 30 TABLET | Refills: 2 | Status: SHIPPED | OUTPATIENT
Start: 2022-05-24 | End: 2022-08-18 | Stop reason: SDUPTHER

## 2022-05-25 NOTE — TELEPHONE ENCOUNTER
Requested Prescriptions     Signed Prescriptions Disp Refills   • zolpidem (AMBIEN) 10 MG Tab 30 Tablet 2     Sig: Take 1 Tablet by mouth at bedtime as needed for Sleep for up to 30 days.     Authorizing Provider: JAVIER BURK PMP reviewed today    MIRTA Dye.

## 2022-06-09 DIAGNOSIS — F41.1 GENERALIZED ANXIETY DISORDER: ICD-10-CM

## 2022-06-09 PROCEDURE — RXMED WILLOW AMBULATORY MEDICATION CHARGE: Performed by: NURSE PRACTITIONER

## 2022-06-09 RX ORDER — FLUOXETINE HYDROCHLORIDE 20 MG/1
20 CAPSULE ORAL DAILY
Qty: 90 CAPSULE | Refills: 3 | Status: SHIPPED | OUTPATIENT
Start: 2022-06-09 | End: 2022-12-01 | Stop reason: SDUPTHER

## 2022-06-09 NOTE — TELEPHONE ENCOUNTER
Requested Prescriptions     Signed Prescriptions Disp Refills   • FLUoxetine (PROZAC) 20 MG Cap 90 Capsule 3     Sig: Take 1 Capsule by mouth every day.     Authorizing Provider: JAVIER BURK A.P.R.N.

## 2022-06-10 ENCOUNTER — PHARMACY VISIT (OUTPATIENT)
Dept: PHARMACY | Facility: MEDICAL CENTER | Age: 62
End: 2022-06-10
Payer: COMMERCIAL

## 2022-06-13 ENCOUNTER — OFFICE VISIT (OUTPATIENT)
Dept: SLEEP MEDICINE | Facility: MEDICAL CENTER | Age: 62
End: 2022-06-13
Payer: COMMERCIAL

## 2022-06-13 VITALS
DIASTOLIC BLOOD PRESSURE: 66 MMHG | SYSTOLIC BLOOD PRESSURE: 128 MMHG | HEART RATE: 95 BPM | HEIGHT: 62 IN | BODY MASS INDEX: 28.34 KG/M2 | WEIGHT: 154 LBS | OXYGEN SATURATION: 92 %

## 2022-06-13 DIAGNOSIS — G47.00 INSOMNIA, UNSPECIFIED TYPE: Chronic | ICD-10-CM

## 2022-06-13 DIAGNOSIS — G47.33 OBSTRUCTIVE SLEEP APNEA: Chronic | ICD-10-CM

## 2022-06-13 DIAGNOSIS — Z78.9 NONSMOKER: ICD-10-CM

## 2022-06-13 DIAGNOSIS — I10 ESSENTIAL HYPERTENSION: ICD-10-CM

## 2022-06-13 PROCEDURE — 99213 OFFICE O/P EST LOW 20 MIN: CPT | Performed by: NURSE PRACTITIONER

## 2022-06-13 ASSESSMENT — FIBROSIS 4 INDEX: FIB4 SCORE: 0.98

## 2022-06-13 NOTE — PROGRESS NOTES
Chief Complaint   Patient presents with   • Apnea     EVI-Last seen 06/03/2021         HPI:  Koki Townsend is a 61 y.o. year old female here today for follow-up on EVI.  Last OV 6/3/21    Currently using BIPAP @ 21/16cm H20 nightly; [employer]ICS; device obtained 2017. Device is registered for recall - no acute symptoms noted with recall.  Plans report 5/14/2022 through 6/12/2022 indicate 96.7% compliance, average nightly use 7 hours 46 minutes, large leak of 13 minutes, with a reduced AHI of 2.8/h.  She continues to tolerate mask and pressure well.  She is motivated to lose another 10 pounds.  She denies morning headaches.  She feels rested during the day.  She is overall happy with therapy and sleep symptoms well controlled.    She does have a history of insomnia and continues Ambien 10 mg nightly.  She notes sleeping through the night with use.  PCP currently prescribes.  She is compliant with use.    Sleep hx:  PSG split-night 1/11/2017 indicates severe sleep apnea with an AHI of 94.6 and a minimum oxygen saturation of 77%. Titration study 2/28/17 noted best response to BiPAP 24/18 cm with a reduced AHI of 3.1/h and O2 peter 92%.  Small Jenny view mask was used.  Currently using BIPAP 21/16cm, biflex 3. Device obtained 2017.    ROS: As per HPI and otherwise negative if not stated.    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    • Sleep apnea    • Soft tissue mass 7/3/2020   • Thyroid disease    • Tonsillitis        Past Surgical History:   Procedure Laterality Date   • SINUSCOPE     • THYROIDECTOMY Left 2000's    Left lobe removed   • TONSILLECTOMY     • TUBAL LIGATION         Family History   Problem Relation Age of Onset   • Heart Failure Mother    •  "Diabetes Mother    • Cancer Father         lung-abest   • Lung Cancer Father    • Diabetes Sister    • Sleep Apnea Sister    • Heart Disease Sister    • Sleep Apnea Brother    • No Known Problems Brother    • Heart Disease Maternal Grandmother         heart valve   • Cancer Paternal Grandmother         liver   • Stroke Neg Hx        Social History     Socioeconomic History   • Marital status: Single     Spouse name: Not on file   • Number of children: Not on file   • Years of education: Not on file   • Highest education level: Some college, no degree   Occupational History   • Not on file   Tobacco Use   • Smoking status: Never Smoker   • Smokeless tobacco: Never Used   Vaping Use   • Vaping Use: Never used   Substance and Sexual Activity   • Alcohol use: No     Alcohol/week: 0.0 oz   • Drug use: Not Currently   • Sexual activity: Not Currently     Partners: Male     Birth control/protection: Surgical   Other Topics Concern   • Not on file   Social History Narrative   • Not on file     Social Determinants of Health     Financial Resource Strain: Not on file   Food Insecurity: Not on file   Transportation Needs: Not on file   Physical Activity: Not on file   Stress: Not on file   Social Connections: Not on file   Intimate Partner Violence: Not on file   Housing Stability: Not on file       Allergies as of 06/13/2022 - Reviewed 06/13/2022   Allergen Reaction Noted   • Codeine Itching 12/18/2012        Vitals:  /66 (BP Location: Left arm, Patient Position: Sitting, BP Cuff Size: Adult)   Pulse 95   Ht 1.575 m (5' 2\")   Wt 69.9 kg (154 lb)   SpO2 92%     Current medications as of today   Current Outpatient Medications   Medication Sig Dispense Refill   • FLUoxetine (PROZAC) 20 MG Cap Take 1 Capsule by mouth every day. 90 Capsule 3   • zolpidem (AMBIEN) 10 MG Tab Take 1 Tablet by mouth at bedtime as needed for Sleep for up to 30 days. 30 Tablet 2   • enalapril-hydrochlorthiazide (VASERETIC) 10-25 MG per tablet " TAKE ONE TABLET BY MOUTH ONCE DAILY 90 Tablet 1   • atorvastatin (LIPITOR) 20 MG Tab Take 1 tablet by mouth every day. 90 Tablet 2   • Cholecalciferol (VITAMIN D3) 2000 UNIT Cap Take 1 capsule by mouth every day. 90 Capsule 2   • levothyroxine (SYNTHROID) 100 MCG Tab Take 1 tablet by mouth every morning on an empty stomach. 90 Tablet 2   • Empagliflozin (JARDIANCE) 10 MG Tab Take 1 tablet by mouth every day. 90 Tablet 3   • metFORMIN ER (GLUCOPHAGE XR) 500 MG TABLET SR 24 HR Take 2 tablets by mouth 2 times a day with meals. 720 tablet 2     No current facility-administered medications for this visit.         Physical Exam:   Gen:           Alert and oriented, No apparent distress. Mood and affect appropriate, normal interaction with examiner.  Eyes:          PERRL, EOM intact, sclere white, conjunctive moist.  Ears:          Not examined.   Hearing:     Grossly intact.  Nose:          Normal, no lesions or deformities.  Dentition:    mask  Oropharynx:   mask  Mallampati Classification: mask  Neck:        Supple, trachea midline, no masses.  Respiratory Effort: No intercostal retractions or use of accessory muscles.   Lung Auscultation:      Clear to auscultation bilaterally; no rales, rhonchi or wheezing.  CV:            Regular rate and rhythm. No murmurs, rubs or gallops.  Abd:           Not examined.   Lymphadenopathy: Not examined.  Gait and Station: Normal.  Digits and Nails: No clubbing, cyanosis, petechiae, or nodes.   Cranial Nerves: II-XII grossly intact.  Skin:        No rashes, lesions or ulcers noted.               Ext:           No cyanosis or edema.      Assessment:  1. Obstructive sleep apnea  DME Mask and Supplies   2. Insomnia, unspecified type     3. Essential hypertension     4. BMI 28.0-28.9,adult     5. Nonsmoker         Immunizations:    Flu:9/28/21  Pneumovax 23:2020, recommend update  Prevnar 13:not due till 65yrs  COVID-19: 4/11/22, 9/28/21, 1/12/21, 12/22/20    Plan:  1.  EVI is well  controlled.  Continue BiPAP nightly.  Patient will await her replacement device from recall.  DME mask/supplies  2.  Insomnia controlled with Ambien 10 mg nightly as needed per PCP.  3.  For primary care for the health concerns including management hypertension  4.  Encouraged further weight loss through dietary changes and exercise  5.  Follow-up in 1 year with compliance report, sooner if needed.    Please note that this dictation was created using voice recognition software. I have made every reasonable attempt to correct obvious errors, but it is possible there are errors of grammar and possibly content that I did not discover before finalizing the note.

## 2022-06-14 ENCOUNTER — HOSPITAL ENCOUNTER (OUTPATIENT)
Dept: LAB | Facility: MEDICAL CENTER | Age: 62
End: 2022-06-14
Attending: NURSE PRACTITIONER
Payer: COMMERCIAL

## 2022-06-14 DIAGNOSIS — E87.6 HYPOKALEMIA: ICD-10-CM

## 2022-06-14 LAB — POTASSIUM SERPL-SCNC: 3.7 MMOL/L (ref 3.6–5.5)

## 2022-06-14 PROCEDURE — 84132 ASSAY OF SERUM POTASSIUM: CPT

## 2022-06-14 PROCEDURE — 36415 COLL VENOUS BLD VENIPUNCTURE: CPT

## 2022-06-15 DIAGNOSIS — E87.6 HYPOKALEMIA: ICD-10-CM

## 2022-06-15 NOTE — TELEPHONE ENCOUNTER
Was the patient seen in the last year in this department? Yes     Does patient have an active prescription for medications requested? No     Received Request Via: Patient   The patient is a 28y Female complaining of shortness of breath.

## 2022-06-23 ENCOUNTER — NON-PROVIDER VISIT (OUTPATIENT)
Dept: MEDICAL GROUP | Facility: PHYSICIAN GROUP | Age: 62
End: 2022-06-23
Payer: COMMERCIAL

## 2022-06-23 DIAGNOSIS — Z23 NEED FOR VACCINATION: ICD-10-CM

## 2022-06-23 PROCEDURE — 90471 IMMUNIZATION ADMIN: CPT | Performed by: NURSE PRACTITIONER

## 2022-06-23 PROCEDURE — 90677 PCV20 VACCINE IM: CPT | Performed by: NURSE PRACTITIONER

## 2022-06-23 NOTE — PROGRESS NOTES
Koki Townsend is a 61 y.o. female here for a non-provider visit for pneumococcal injection.    Reason for injection: Due for it  Order in MAR?: Yes  Patient supplied?:No  Minimum interval has been met for this injection (per MAR order): Yes    Patient tolerated injection and no adverse effects were observed or reported: Yes    # of Administrations remaining in MAR: 0

## 2022-08-02 ENCOUNTER — TELEPHONE (OUTPATIENT)
Dept: PHARMACY | Facility: MEDICAL CENTER | Age: 62
End: 2022-08-02
Payer: COMMERCIAL

## 2022-08-02 DIAGNOSIS — E11.9 CONTROLLED TYPE 2 DIABETES MELLITUS WITHOUT COMPLICATION, WITHOUT LONG-TERM CURRENT USE OF INSULIN (HCC): ICD-10-CM

## 2022-08-02 PROCEDURE — RXMED WILLOW AMBULATORY MEDICATION CHARGE: Performed by: INTERNAL MEDICINE

## 2022-08-02 PROCEDURE — RXMED WILLOW AMBULATORY MEDICATION CHARGE: Performed by: NURSE PRACTITIONER

## 2022-08-02 NOTE — TELEPHONE ENCOUNTER
Spoke to Yamileth, she is doing well. Jardiance and other medications are set for delivery to her home on 0/05, via [employer]. Copays will be charged to cc on file

## 2022-08-02 NOTE — TELEPHONE ENCOUNTER
Received request via: Pharmacy    Was the patient seen in the last year in this department? Yes    Does the patient have an active prescription (recently filled or refills available) for medication(s) requested? no

## 2022-08-03 ENCOUNTER — PHARMACY VISIT (OUTPATIENT)
Dept: PHARMACY | Facility: MEDICAL CENTER | Age: 62
End: 2022-08-03
Payer: COMMERCIAL

## 2022-08-03 PROCEDURE — RXMED WILLOW AMBULATORY MEDICATION CHARGE: Performed by: INTERNAL MEDICINE

## 2022-08-03 RX ORDER — METFORMIN HYDROCHLORIDE 500 MG/1
TABLET, EXTENDED RELEASE ORAL 2 TIMES DAILY WITH MEALS
Qty: 360 TABLET | Refills: 3 | Status: SHIPPED | OUTPATIENT
Start: 2022-08-03 | End: 2023-06-06 | Stop reason: SDUPTHER

## 2022-08-03 RX ORDER — EMPAGLIFLOZIN 10 MG/1
TABLET, FILM COATED ORAL DAILY
Qty: 90 TABLET | Refills: 3 | Status: SHIPPED | OUTPATIENT
Start: 2022-08-03 | End: 2023-06-06 | Stop reason: SDUPTHER

## 2022-08-12 ENCOUNTER — HOSPITAL ENCOUNTER (OUTPATIENT)
Dept: LAB | Facility: MEDICAL CENTER | Age: 62
End: 2022-08-12
Attending: NURSE PRACTITIONER
Payer: COMMERCIAL

## 2022-08-12 DIAGNOSIS — E87.6 HYPOKALEMIA: ICD-10-CM

## 2022-08-12 LAB — POTASSIUM SERPL-SCNC: 3.6 MMOL/L (ref 3.6–5.5)

## 2022-08-12 PROCEDURE — 84132 ASSAY OF SERUM POTASSIUM: CPT

## 2022-08-12 PROCEDURE — 36415 COLL VENOUS BLD VENIPUNCTURE: CPT

## 2022-08-18 ENCOUNTER — OFFICE VISIT (OUTPATIENT)
Dept: MEDICAL GROUP | Facility: PHYSICIAN GROUP | Age: 62
End: 2022-08-18
Payer: COMMERCIAL

## 2022-08-18 VITALS
BODY MASS INDEX: 29.44 KG/M2 | TEMPERATURE: 97.9 F | WEIGHT: 160 LBS | DIASTOLIC BLOOD PRESSURE: 62 MMHG | HEIGHT: 62 IN | HEART RATE: 78 BPM | SYSTOLIC BLOOD PRESSURE: 110 MMHG | OXYGEN SATURATION: 95 %

## 2022-08-18 DIAGNOSIS — E87.6 HYPOKALEMIA: ICD-10-CM

## 2022-08-18 DIAGNOSIS — G47.00 INSOMNIA, UNSPECIFIED TYPE: Chronic | ICD-10-CM

## 2022-08-18 DIAGNOSIS — F41.1 GENERALIZED ANXIETY DISORDER: ICD-10-CM

## 2022-08-18 PROCEDURE — RXMED WILLOW AMBULATORY MEDICATION CHARGE: Performed by: NURSE PRACTITIONER

## 2022-08-18 PROCEDURE — 99214 OFFICE O/P EST MOD 30 MIN: CPT | Performed by: NURSE PRACTITIONER

## 2022-08-18 RX ORDER — ZOLPIDEM TARTRATE 10 MG/1
TABLET ORAL
COMMUNITY
Start: 2022-07-23 | End: 2022-08-18

## 2022-08-18 RX ORDER — POTASSIUM CHLORIDE 750 MG/1
10 TABLET, FILM COATED, EXTENDED RELEASE ORAL DAILY
Qty: 90 TABLET | Refills: 0 | Status: SHIPPED | OUTPATIENT
Start: 2022-08-18 | End: 2022-10-24 | Stop reason: SDUPTHER

## 2022-08-18 RX ORDER — ZOLPIDEM TARTRATE 10 MG/1
10 TABLET ORAL
Qty: 30 TABLET | Refills: 2 | Status: SHIPPED | OUTPATIENT
Start: 2022-08-18 | End: 2022-09-17

## 2022-08-18 ASSESSMENT — FIBROSIS 4 INDEX: FIB4 SCORE: 0.98

## 2022-08-18 NOTE — ASSESSMENT & PLAN NOTE
She remains off potassium supplementation.  Recent labs show potassium level of 3.6 which is at the lower end of normal range.  She continues with enalapril-hydrochlorothiazide for blood pressure. She is eating bananas and salads but notes that her potassium dietary intake can fluctuate.

## 2022-08-18 NOTE — PROGRESS NOTES
Subjective:     CC: Medication management and lab results    HPI:   Koki presents today with the following:    Hypokalemia  She remains off potassium supplementation.  Recent labs show potassium level of 3.6 which is at the lower end of normal range.  She continues with enalapril-hydrochlorothiazide for blood pressure. She is eating bananas and salads but notes that her potassium dietary intake can fluctuate.    Insomnia  She continues with zolpidem 10 mg nightly as needed. She continues wearing her bipap nightly.  Taking the medication does allow her to sleep throughout the night. She is tolerating the medication.  She would like a medication refill today and sent to [facility] in [place].  She is up-to-date on UDS.  We will update her controlled substance treatment agreement as she will be due soon.    ARMEN (generalized anxiety disorder)  She continues with fluoxetine 20 mg daily.  She reports that her anxiety is controlled and has not needed her lorazepam. 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 90 Tablet 0    zolpidem (AMBIEN) 10 MG Tab Take 1 Tablet by mouth at bedtime as needed for Sleep for up to 30 days. 30 Tablet 2    metFORMIN ER (GLUCOPHAGE XR) 500 MG TABLET SR 24  "HR Take 2 tablets by mouth 2 times a day with meals. 360 Tablet 3    Empagliflozin (JARDIANCE) 10 MG Tab Take 1 tablet by mouth every day. 90 Tablet 3    FLUoxetine (PROZAC) 20 MG Cap Take 1 Capsule by mouth every day. 90 Capsule 3    enalapril-hydrochlorthiazide (VASERETIC) 10-25 MG per tablet TAKE ONE TABLET BY MOUTH ONCE DAILY 90 Tablet 1    atorvastatin (LIPITOR) 20 MG Tab Take 1 tablet by mouth every day. 90 Tablet 2    Cholecalciferol (VITAMIN D3) 2000 UNIT Cap Take 1 capsule by mouth every day. 90 Capsule 2    levothyroxine (SYNTHROID) 100 MCG Tab Take 1 tablet by mouth every morning on an empty stomach. 90 Tablet 2     No current Epic-ordered facility-administered medications on file.       Allergies:  Codeine    Health Maintenance: Due for zoster vaccine      Objective:     Vital signs reviewed  Exam:  /62 (BP Location: Left arm, Patient Position: Sitting, BP Cuff Size: Adult)   Pulse 78   Temp 36.6 °C (97.9 °F) (Temporal)   Ht 1.575 m (5' 2\")   Wt 72.6 kg (160 lb)   SpO2 95%   BMI 29.26 kg/m²  Body mass index is 29.26 kg/m².    Gen: Alert and oriented, No apparent distress.  Eyes:   Lids normal. Glasses in place.   Lungs: Normal effort, CTA bilaterally, no wheezes, rhonchi, or rales  CV: Regular rate and rhythm. No murmurs, rubs, or gallops.  Ext: No clubbing, cyanosis, edema.      Assessment & Plan:     61 y.o. female with the following -     1. Hypokalemia  Chronic stable problem.  She states that she does have her previous old prescription that she did not take.  Discussed I would like her to start the potassium chloride 10 mill equivalent prescription and recheck her potassium level in 2 weeks.  In 2 weeks we will follow-up with her lab results in [facility] and if her potassium is stable I would like her to continue the potassium chloride 10 M EQ daily until her endocrinology labs around November 2022.  I sent over 90-day supply and discussed that after her labs in 2 weeks I will let her know " if she should  the 90-day supply.  She will continue with her enalapril-hydrochlorothiazide for blood pressure control which is controlled today.  - potassium chloride ER (KLOR-CON) 10 MEQ tablet; Take 1 Tablet by mouth every day.  Dispense: 90 Tablet; Refill: 0  - POTASSIUM SERUM (K); Future    2. Insomnia, unspecified type  Chronic stable problem.  Continue zolpidem 10 mg nightly as needed.  Controlled substance treatment agreement updated today.  Medication electronically refilled today.   reviewed today.  She will return in 3 months for follow-up and medication refill.  - Controlled Substance Treatment Agreement  - zolpidem (AMBIEN) 10 MG Tab; Take 1 Tablet by mouth at bedtime as needed for Sleep for up to 30 days.  Dispense: 30 Tablet; Refill: 2    3. ARMEN (generalized anxiety disorder)  Chronic stable problem.  Continue with fluoxetine 20 mg daily.      Return in about 3 months (around 11/18/2022) for medication management.    Please note that this dictation was created using voice recognition software. I have made every reasonable attempt to correct obvious errors, but I expect that there are errors of grammar and possibly content that I did not discover before finalizing the note.

## 2022-08-18 NOTE — ASSESSMENT & PLAN NOTE
She continues with zolpidem 10 mg nightly as needed. She continues wearing her bipap nightly.  Taking the medication does allow her to sleep throughout the night. She is tolerating the medication.  She would like a medication refill today and sent to [facility] in [place].  She is up-to-date on UDS.  We will update her controlled substance treatment agreement as she will be due soon.

## 2022-08-18 NOTE — ASSESSMENT & PLAN NOTE
She continues with fluoxetine 20 mg daily.  She reports that her anxiety is controlled and has not needed her lorazepam. Denies self harm or SI

## 2022-08-19 ENCOUNTER — PHARMACY VISIT (OUTPATIENT)
Dept: PHARMACY | Facility: MEDICAL CENTER | Age: 62
End: 2022-08-19
Payer: COMMERCIAL

## 2022-09-01 PROCEDURE — RXMED WILLOW AMBULATORY MEDICATION CHARGE: Performed by: NURSE PRACTITIONER

## 2022-09-02 ENCOUNTER — PHARMACY VISIT (OUTPATIENT)
Dept: PHARMACY | Facility: MEDICAL CENTER | Age: 62
End: 2022-09-02
Payer: COMMERCIAL

## 2022-09-14 ENCOUNTER — HOSPITAL ENCOUNTER (OUTPATIENT)
Dept: LAB | Facility: MEDICAL CENTER | Age: 62
End: 2022-09-14
Attending: NURSE PRACTITIONER
Payer: COMMERCIAL

## 2022-09-14 DIAGNOSIS — E87.6 HYPOKALEMIA: ICD-10-CM

## 2022-09-14 LAB — POTASSIUM SERPL-SCNC: 3.7 MMOL/L (ref 3.6–5.5)

## 2022-09-14 PROCEDURE — 84132 ASSAY OF SERUM POTASSIUM: CPT

## 2022-09-14 PROCEDURE — 36415 COLL VENOUS BLD VENIPUNCTURE: CPT

## 2022-10-24 DIAGNOSIS — E55.9 VITAMIN D DEFICIENCY: ICD-10-CM

## 2022-10-24 DIAGNOSIS — I10 ESSENTIAL HYPERTENSION: ICD-10-CM

## 2022-10-24 DIAGNOSIS — E89.0 POSTOPERATIVE HYPOTHYROIDISM: ICD-10-CM

## 2022-10-24 DIAGNOSIS — E87.6 HYPOKALEMIA: ICD-10-CM

## 2022-10-24 DIAGNOSIS — E78.5 DYSLIPIDEMIA: ICD-10-CM

## 2022-10-24 PROCEDURE — RXMED WILLOW AMBULATORY MEDICATION CHARGE: Performed by: INTERNAL MEDICINE

## 2022-10-24 RX ORDER — ACETAMINOPHEN 160 MG
1 TABLET,DISINTEGRATING ORAL DAILY
Qty: 90 CAPSULE | Refills: 2 | Status: SHIPPED | OUTPATIENT
Start: 2022-10-24 | End: 2023-09-10 | Stop reason: SDUPTHER

## 2022-10-24 RX ORDER — POTASSIUM CHLORIDE 750 MG/1
10 TABLET, FILM COATED, EXTENDED RELEASE ORAL DAILY
Qty: 90 TABLET | Refills: 0 | Status: SHIPPED | OUTPATIENT
Start: 2022-10-24 | End: 2022-12-01 | Stop reason: SDUPTHER

## 2022-10-24 RX ORDER — ENALAPRIL MALEATE AND HYDROCHLOROTHIAZIDE 10; 25 MG/1; MG/1
TABLET ORAL
Qty: 90 TABLET | Refills: 3 | Status: SHIPPED | OUTPATIENT
Start: 2022-10-24 | End: 2023-10-30 | Stop reason: SDUPTHER

## 2022-10-24 RX ORDER — ATORVASTATIN CALCIUM 20 MG/1
20 TABLET, FILM COATED ORAL DAILY
Qty: 90 TABLET | Refills: 2 | Status: SHIPPED | OUTPATIENT
Start: 2022-10-24 | End: 2023-10-30 | Stop reason: SDUPTHER

## 2022-10-24 RX ORDER — LEVOTHYROXINE SODIUM 0.1 MG/1
100 TABLET ORAL
Qty: 90 TABLET | Refills: 2 | Status: SHIPPED | OUTPATIENT
Start: 2022-10-24 | End: 2023-10-30 | Stop reason: SDUPTHER

## 2022-10-25 PROCEDURE — RXMED WILLOW AMBULATORY MEDICATION CHARGE: Performed by: INTERNAL MEDICINE

## 2022-10-25 PROCEDURE — RXMED WILLOW AMBULATORY MEDICATION CHARGE: Performed by: NURSE PRACTITIONER

## 2022-10-25 NOTE — TELEPHONE ENCOUNTER
Requested Prescriptions     Signed Prescriptions Disp Refills    enalapril-hydrochlorthiazide (VASERETIC) 10-25 MG per tablet 90 Tablet 3     Sig: TAKE ONE TABLET BY MOUTH ONCE DAILY     Authorizing Provider: JAVIER BURK    potassium chloride ER (KLOR-CON) 10 MEQ tablet 90 Tablet 0     Sig: Take 1 Tablet by mouth every day.     Authorizing Provider: JAVIER BURK A.P.R.N.

## 2022-10-27 ENCOUNTER — NON-PROVIDER VISIT (OUTPATIENT)
Dept: URGENT CARE | Facility: PHYSICIAN GROUP | Age: 62
End: 2022-10-27
Payer: COMMERCIAL

## 2022-10-27 ENCOUNTER — PHARMACY VISIT (OUTPATIENT)
Dept: PHARMACY | Facility: MEDICAL CENTER | Age: 62
End: 2022-10-27
Payer: COMMERCIAL

## 2022-10-27 ENCOUNTER — HOSPITAL ENCOUNTER (OUTPATIENT)
Dept: LAB | Facility: MEDICAL CENTER | Age: 62
End: 2022-10-27
Attending: NURSE PRACTITIONER
Payer: COMMERCIAL

## 2022-10-27 DIAGNOSIS — E87.6 HYPOKALEMIA: ICD-10-CM

## 2022-10-27 DIAGNOSIS — E55.9 VITAMIN D DEFICIENCY: ICD-10-CM

## 2022-10-27 DIAGNOSIS — E89.0 POSTOPERATIVE HYPOTHYROIDISM: ICD-10-CM

## 2022-10-27 DIAGNOSIS — Z23 NEED FOR VACCINATION: Primary | ICD-10-CM

## 2022-10-27 DIAGNOSIS — Z23 NEEDS FLU SHOT: ICD-10-CM

## 2022-10-27 DIAGNOSIS — E11.9 CONTROLLED TYPE 2 DIABETES MELLITUS WITHOUT COMPLICATION, WITHOUT LONG-TERM CURRENT USE OF INSULIN (HCC): ICD-10-CM

## 2022-10-27 DIAGNOSIS — E78.5 DYSLIPIDEMIA: ICD-10-CM

## 2022-10-27 DIAGNOSIS — Z79.84 LONG TERM (CURRENT) USE OF ORAL HYPOGLYCEMIC DRUGS: ICD-10-CM

## 2022-10-27 LAB
25(OH)D3 SERPL-MCNC: 41 NG/ML (ref 30–100)
ALBUMIN SERPL BCP-MCNC: 4.5 G/DL (ref 3.2–4.9)
ALBUMIN/GLOB SERPL: 1.6 G/DL
ALP SERPL-CCNC: 91 U/L (ref 30–99)
ALT SERPL-CCNC: 77 U/L (ref 2–50)
ANION GAP SERPL CALC-SCNC: 10 MMOL/L (ref 7–16)
AST SERPL-CCNC: 43 U/L (ref 12–45)
BILIRUB SERPL-MCNC: 0.9 MG/DL (ref 0.1–1.5)
BUN SERPL-MCNC: 15 MG/DL (ref 8–22)
CALCIUM SERPL-MCNC: 10.1 MG/DL (ref 8.5–10.5)
CHLORIDE SERPL-SCNC: 100 MMOL/L (ref 96–112)
CHOLEST SERPL-MCNC: 128 MG/DL (ref 100–199)
CO2 SERPL-SCNC: 27 MMOL/L (ref 20–33)
CREAT SERPL-MCNC: 0.92 MG/DL (ref 0.5–1.4)
CREAT UR-MCNC: 59.38 MG/DL
FASTING STATUS PATIENT QL REPORTED: NORMAL
GFR SERPLBLD CREATININE-BSD FMLA CKD-EPI: 70 ML/MIN/1.73 M 2
GLOBULIN SER CALC-MCNC: 2.9 G/DL (ref 1.9–3.5)
GLUCOSE SERPL-MCNC: 111 MG/DL (ref 65–99)
HDLC SERPL-MCNC: 45 MG/DL
LDLC SERPL CALC-MCNC: 60 MG/DL
MICROALBUMIN UR-MCNC: <1.2 MG/DL
MICROALBUMIN/CREAT UR: NORMAL MG/G (ref 0–30)
POTASSIUM SERPL-SCNC: 3.7 MMOL/L (ref 3.6–5.5)
PROT SERPL-MCNC: 7.4 G/DL (ref 6–8.2)
SODIUM SERPL-SCNC: 137 MMOL/L (ref 135–145)
T4 FREE SERPL-MCNC: 1.77 NG/DL (ref 0.93–1.7)
TRIGL SERPL-MCNC: 117 MG/DL (ref 0–149)
TSH SERPL DL<=0.005 MIU/L-ACNC: 0.57 UIU/ML (ref 0.38–5.33)

## 2022-10-27 PROCEDURE — 84439 ASSAY OF FREE THYROXINE: CPT

## 2022-10-27 PROCEDURE — 90471 IMMUNIZATION ADMIN: CPT | Performed by: INTERNAL MEDICINE

## 2022-10-27 PROCEDURE — 90686 IIV4 VACC NO PRSV 0.5 ML IM: CPT | Performed by: INTERNAL MEDICINE

## 2022-10-27 PROCEDURE — 82570 ASSAY OF URINE CREATININE: CPT

## 2022-10-27 PROCEDURE — 82306 VITAMIN D 25 HYDROXY: CPT

## 2022-10-27 PROCEDURE — 82043 UR ALBUMIN QUANTITATIVE: CPT

## 2022-10-27 PROCEDURE — 84443 ASSAY THYROID STIM HORMONE: CPT

## 2022-10-27 PROCEDURE — 36415 COLL VENOUS BLD VENIPUNCTURE: CPT

## 2022-10-27 PROCEDURE — 80061 LIPID PANEL: CPT

## 2022-10-27 PROCEDURE — 80053 COMPREHEN METABOLIC PANEL: CPT

## 2022-10-27 NOTE — PROGRESS NOTES
"Koki Townsend is a 61 y.o. female here for a non-provider visit for:   FLU    Reason for immunization: Annual Flu Vaccine  Immunization records indicate need for vaccine: Yes, confirmed with Epic  Minimum interval has been met for this vaccine: Yes  ABN completed: Yes    VIS Dated  10/27/22 was given to patient: Yes  All IAC Questionnaire questions were answered \"No.\"    Patient tolerated injection and no adverse effects were observed or reported: Yes    Pt scheduled for next dose in series: Not Indicated   "

## 2022-10-31 ENCOUNTER — OFFICE VISIT (OUTPATIENT)
Dept: URGENT CARE | Facility: PHYSICIAN GROUP | Age: 62
End: 2022-10-31
Payer: COMMERCIAL

## 2022-10-31 VITALS
RESPIRATION RATE: 14 BRPM | HEIGHT: 62 IN | BODY MASS INDEX: 30 KG/M2 | DIASTOLIC BLOOD PRESSURE: 64 MMHG | SYSTOLIC BLOOD PRESSURE: 128 MMHG | WEIGHT: 163 LBS | OXYGEN SATURATION: 98 % | HEART RATE: 60 BPM | TEMPERATURE: 98.7 F

## 2022-10-31 DIAGNOSIS — K04.7 DENTAL INFECTION: ICD-10-CM

## 2022-10-31 PROCEDURE — 99213 OFFICE O/P EST LOW 20 MIN: CPT | Performed by: PHYSICIAN ASSISTANT

## 2022-10-31 RX ORDER — AMOXICILLIN AND CLAVULANATE POTASSIUM 875; 125 MG/1; MG/1
1 TABLET, FILM COATED ORAL 2 TIMES DAILY
Qty: 14 TABLET | Refills: 0 | Status: SHIPPED | OUTPATIENT
Start: 2022-10-31 | End: 2022-11-07

## 2022-10-31 ASSESSMENT — FIBROSIS 4 INDEX: FIB4 SCORE: 1.18

## 2022-11-01 NOTE — PROGRESS NOTES
"Subjective:   Koki Townsend is a 61 y.o. female who presents for Oral Swelling (X 2 days , pain and swelling )      HPI  The patient presents to the Urgent Care with complaints of a toothache onset 2 days ago.  Location to the left upper tooth.  She states she has a feeling and cavities.  History of this before and she was treated with antibiotics.  Mild swelling.  Radiation pain to her left cheek.  Pain is worse with chewing.  Denies any fever, chills, sore throat, trouble breathing, trouble swallowing, chest pain or shortness of breath.          Medications:    atorvastatin Tabs  enalapril-hydrochlorthiazide  FLUoxetine Caps  Jardiance Tabs  levothyroxine Tabs  metFORMIN ER Tb24  potassium chloride ER  Vitamin D3 Caps    Allergies: Codeine    Problem List: Koki Townsend does not have any pertinent problems on file.    Surgical History:  Past Surgical History:   Procedure Laterality Date    SINUSCOPE      THYROIDECTOMY Left 2000's    Left lobe removed    TONSILLECTOMY      TUBAL LIGATION         Past Social Hx: Koki Townsend  reports that she has never smoked. She has never used smokeless tobacco. She reports that she does not currently use drugs. She reports that she does not drink alcohol.     Past Family Hx:  Koki Townsend family history includes Cancer in her father and paternal grandmother; Diabetes in her mother and sister; Heart Disease in her maternal grandmother and sister; Heart Failure in her mother; Lung Cancer in her father; No Known Problems in her brother; Sleep Apnea in her brother and sister.     Problem list, medications, and allergies reviewed by myself today in Epic.     Objective:     /64 (BP Location: Left arm, Patient Position: Sitting, BP Cuff Size: Adult)   Pulse 60   Temp 37.1 °C (98.7 °F) (Temporal)   Resp 14   Ht 1.575 m (5' 2\")   Wt 73.9 kg (163 lb)   SpO2 98%   BMI 29.81 kg/m²     Physical Exam  Vitals reviewed.   Constitutional:       General: She is not in acute " distress.     Appearance: Normal appearance. She is not ill-appearing or toxic-appearing.   HENT:      Mouth/Throat:      Dentition: Abnormal dentition. Dental tenderness and dental caries present. No gingival swelling or dental abscesses.      Pharynx: Oropharynx is clear. Uvula midline. No pharyngeal swelling, oropharyngeal exudate, posterior oropharyngeal erythema or uvula swelling.      Tonsils: No tonsillar exudate or tonsillar abscesses.     Eyes:      Conjunctiva/sclera: Conjunctivae normal.      Pupils: Pupils are equal, round, and reactive to light.   Cardiovascular:      Rate and Rhythm: Normal rate.   Pulmonary:      Effort: Pulmonary effort is normal.   Musculoskeletal:      Cervical back: Neck supple.   Skin:     General: Skin is warm and dry.   Neurological:      General: No focal deficit present.      Mental Status: She is alert and oriented to person, place, and time.   Psychiatric:         Mood and Affect: Mood normal.         Behavior: Behavior normal.       Diagnosis and associated orders:     1. Dental infection  - amoxicillin-clavulanate (AUGMENTIN) 875-125 MG Tab; Take 1 Tablet by mouth 2 times a day for 7 days.  Dispense: 14 Tablet; Refill: 0     Comments/MDM:     We will start patient on Augmentin out of concern for possible dental infection.  Increase fluid intake, Tylenol, ibuprofen for pain.  Oral hygiene.  Recommended she follow-up with a dentist.       I personally reviewed prior external notes and test results pertinent to today's visit. Pathogenesis of diagnosis discussed including typical length and natural progression. Supportive care, natural history, differential diagnoses, and indications for immediate follow-up discussed. Patient expresses understanding and agrees to plan. Patient denies any other questions or concerns.     Follow-up with the primary care physician for recheck, reevaluation, and consideration of further management.    Please note that this dictation was created  using voice recognition software. I have made a reasonable attempt to correct obvious errors, but I expect that there are errors of grammar and possibly content that I did not discover before finalizing the note.    This note was electronically signed by Tyler Hernández PA-C

## 2022-11-04 ENCOUNTER — APPOINTMENT (OUTPATIENT)
Dept: ENDOCRINOLOGY | Facility: MEDICAL CENTER | Age: 62
End: 2022-11-04
Attending: INTERNAL MEDICINE
Payer: COMMERCIAL

## 2022-11-07 PROCEDURE — RXMED WILLOW AMBULATORY MEDICATION CHARGE: Performed by: NURSE PRACTITIONER

## 2022-11-08 ENCOUNTER — PHARMACY VISIT (OUTPATIENT)
Dept: PHARMACY | Facility: MEDICAL CENTER | Age: 62
End: 2022-11-08
Payer: COMMERCIAL

## 2022-11-08 PROCEDURE — RXMED WILLOW AMBULATORY MEDICATION CHARGE: Performed by: NURSE PRACTITIONER

## 2022-11-23 ENCOUNTER — OFFICE VISIT (OUTPATIENT)
Dept: MEDICAL GROUP | Facility: PHYSICIAN GROUP | Age: 62
End: 2022-11-23
Payer: COMMERCIAL

## 2022-11-23 VITALS
HEART RATE: 69 BPM | HEIGHT: 62 IN | DIASTOLIC BLOOD PRESSURE: 76 MMHG | TEMPERATURE: 98.6 F | OXYGEN SATURATION: 95 % | RESPIRATION RATE: 16 BRPM | SYSTOLIC BLOOD PRESSURE: 122 MMHG | WEIGHT: 166 LBS | BODY MASS INDEX: 30.55 KG/M2

## 2022-11-23 DIAGNOSIS — Z12.11 SCREENING FOR COLORECTAL CANCER: ICD-10-CM

## 2022-11-23 DIAGNOSIS — G47.00 INSOMNIA, UNSPECIFIED TYPE: Chronic | ICD-10-CM

## 2022-11-23 DIAGNOSIS — Z12.12 SCREENING FOR COLORECTAL CANCER: ICD-10-CM

## 2022-11-23 DIAGNOSIS — E87.6 HYPOKALEMIA: ICD-10-CM

## 2022-11-23 PROCEDURE — 99214 OFFICE O/P EST MOD 30 MIN: CPT | Performed by: NURSE PRACTITIONER

## 2022-11-23 RX ORDER — ZOLPIDEM TARTRATE 10 MG/1
10 TABLET ORAL NIGHTLY PRN
Qty: 30 TABLET | Refills: 2 | Status: SHIPPED | OUTPATIENT
Start: 2022-11-23 | End: 2022-12-23

## 2022-11-23 RX ORDER — ZOLPIDEM TARTRATE 10 MG/1
10 TABLET ORAL NIGHTLY PRN
Qty: 30 TABLET | Refills: 2 | Status: SHIPPED | OUTPATIENT
Start: 2022-11-23 | End: 2022-11-23

## 2022-11-23 ASSESSMENT — FIBROSIS 4 INDEX: FIB4 SCORE: 1.2

## 2022-11-23 NOTE — ASSESSMENT & PLAN NOTE
She continues with zolpidem 10 mg nightly as needed. She does need a medication refill today. She is waking up rested in the morning. She is sleeping about 8 hours each night. She continues with her CPAP.  She is up-to-date on her UDS and controlled substance treatment agreement.  Continues with every 3-month medication refill appointments.

## 2022-11-24 NOTE — ASSESSMENT & PLAN NOTE
She continues with potassium chloride 10 M EQ daily.  Recent labs 1 month ago show stable potassium level.  She continues with enalapril-hydrochlorothiazide 10-25 mg daily for blood pressure control.  Plan to continue with potassium chloride dose.

## 2022-11-24 NOTE — PROGRESS NOTES
Subjective:     CC: medication refill    HPI:   Koki presents today with the following:    Insomnia  She continues with zolpidem 10 mg nightly as needed. She does need a medication refill today. She is waking up rested in the morning. She is sleeping about 8 hours each night. She continues with her CPAP.  She is up-to-date on her UDS and controlled substance treatment agreement.  Continues with every 3-month medication refill appointments.      Hypokalemia  She continues with potassium chloride 10 M EQ daily.  Recent labs 1 month ago show stable potassium level.  She continues with enalapril-hydrochlorothiazide 10-25 mg daily for blood pressure control.  Plan to continue with potassium chloride dose.    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 30 Tablet 2    atorvastatin (LIPITOR) 20 MG Tab Take 1 tablet by mouth every day. 90 Tablet 2    Cholecalciferol (VITAMIN D3) 2000 UNIT Cap Take 1 capsule by mouth every day. 90 Capsule 2    levothyroxine (SYNTHROID) 100 MCG Tab Take 1 tablet by mouth every morning on an empty stomach. 90 Tablet 2    enalapril-hydrochlorthiazide (VASERETIC) 10-25 MG per tablet TAKE ONE TABLET BY MOUTH ONCE DAILY 90 Tablet 3    potassium chloride ER  "(KLOR-CON) 10 MEQ tablet Take 1 Tablet by mouth every day. 90 Tablet 0    metFORMIN ER (GLUCOPHAGE XR) 500 MG TABLET SR 24 HR Take 2 tablets by mouth 2 times a day with meals. 360 Tablet 3    Empagliflozin (JARDIANCE) 10 MG Tab Take 1 tablet by mouth every day. 90 Tablet 3    FLUoxetine (PROZAC) 20 MG Cap Take 1 Capsule by mouth every day. 90 Capsule 3     No current [facility]-ordered facility-administered medications on file.       Allergies:  Codeine    Health Maintenance: Reviewed.  Due for upcoming colonoscopy for 5-year recall.      Objective:     Vital signs reviewed  Exam:  /76   Pulse 69   Temp 37 °C (98.6 °F)   Resp 16   Ht 1.575 m (5' 2\")   Wt 75.3 kg (166 lb)   SpO2 95%   BMI 30.36 kg/m²  Body mass index is 30.36 kg/m².    Gen: Alert and oriented, No apparent distress.  Eyes:   Lids normal. Glasses in place.   Lungs: Normal effort, CTA bilaterally, no wheezes, rhonchi, or rales  CV: Regular rate and rhythm. No murmurs, rubs, or gallops.  Ext: No clubbing, cyanosis, edema.      Assessment & Plan:     62 y.o. female with the following -     1. Insomnia, unspecified type  Chronic stable problem.  She will continue with zolpidem 10 mg nightly as needed.  Continue nightly CPAP.  UDS and controlled substance treatment agreement are up-to-date.   reviewed today.  Medication electronically refilled today.  Continue healthy diet and exercise.  Return in 3 months for follow-up for medication refill.  - zolpidem (AMBIEN) 10 MG Tab; Take 1 Tablet by mouth at bedtime as needed for Sleep for up to 30 days.  Dispense: 30 Tablet; Refill: 2    2. Hypokalemia  Chronic stable problem.  She will continue potassium chloride 10 M EQ daily.    3. Screening for colorectal cancer  Chronic stable problem.  Due for 5-year recall, referral placed today.  - Referral to GI for Colonoscopy      Return in about 3 months (around 2/23/2023), or if symptoms worsen or fail to improve, for medication management ambien .    Please " note that this dictation was created using voice recognition software. I have made every reasonable attempt to correct obvious errors, but I expect that there are errors of grammar and possibly content that I did not discover before finalizing the note.

## 2022-12-01 ENCOUNTER — OFFICE VISIT (OUTPATIENT)
Dept: MEDICAL GROUP | Facility: PHYSICIAN GROUP | Age: 62
End: 2022-12-01
Payer: COMMERCIAL

## 2022-12-01 VITALS
OXYGEN SATURATION: 96 % | BODY MASS INDEX: 30.18 KG/M2 | WEIGHT: 164 LBS | HEART RATE: 60 BPM | DIASTOLIC BLOOD PRESSURE: 62 MMHG | HEIGHT: 62 IN | TEMPERATURE: 96.8 F | SYSTOLIC BLOOD PRESSURE: 128 MMHG

## 2022-12-01 DIAGNOSIS — E11.9 CONTROLLED TYPE 2 DIABETES MELLITUS WITHOUT COMPLICATION, WITHOUT LONG-TERM CURRENT USE OF INSULIN (HCC): ICD-10-CM

## 2022-12-01 DIAGNOSIS — F41.1 GENERALIZED ANXIETY DISORDER: ICD-10-CM

## 2022-12-01 DIAGNOSIS — R22.41 LUMP OF SKIN OF LOWER EXTREMITY, RIGHT: ICD-10-CM

## 2022-12-01 DIAGNOSIS — E87.6 HYPOKALEMIA: ICD-10-CM

## 2022-12-01 PROCEDURE — 99214 OFFICE O/P EST MOD 30 MIN: CPT | Performed by: NURSE PRACTITIONER

## 2022-12-01 RX ORDER — POTASSIUM CHLORIDE 750 MG/1
10 TABLET, FILM COATED, EXTENDED RELEASE ORAL DAILY
Qty: 90 TABLET | Refills: 1 | Status: SHIPPED | OUTPATIENT
Start: 2022-12-01 | End: 2023-09-10 | Stop reason: SDUPTHER

## 2022-12-01 RX ORDER — FLUOXETINE HYDROCHLORIDE 20 MG/1
20 CAPSULE ORAL DAILY
Qty: 90 CAPSULE | Refills: 3 | Status: SHIPPED | OUTPATIENT
Start: 2022-12-01 | End: 2023-09-19

## 2022-12-01 ASSESSMENT — FIBROSIS 4 INDEX: FIB4 SCORE: 1.2

## 2022-12-01 NOTE — ASSESSMENT & PLAN NOTE
She continues with fluoxetine 20 mg daily.  She is tolerating the medication.  She would like a medication refill today.

## 2022-12-01 NOTE — ASSESSMENT & PLAN NOTE
The right posterior leg has a bump that has been present for 6 months. She also had lipoma of her back in 2020 that was found on ultrasound. The bump on the back of her leg does feel that it has moved. No pain. More annoying than pain. No red, warmth or discharge. The bump has not increased in size since she noticed it 6 months ago.

## 2022-12-01 NOTE — PROGRESS NOTES
Subjective:     CC: bump    HPI:   Koki presents today with the following:      Controlled type 2 diabetes mellitus without complication, without long-term current use of insulin (HCC)  Due for A1C but has an appointment in 1 day with endocrinology. She would like to wait to see endocrinology. She continues with metformin ER 1,000 mg twice daily and Empagliflozin 10 mg daily.     ARMEN (generalized anxiety disorder)  She continues with fluoxetine 20 mg daily.  She is tolerating the medication.  She would like a medication refill today.    Lump of skin of lower extremity, right  The right posterior leg has a bump that has been present for 6 months. She also had lipoma of her back in 2020 that was found on ultrasound. The bump on the back of her leg does feel that it has moved. No pain. More annoying than pain. No red, warmth or discharge. The bump has not increased in size since she noticed it 6 months ago.    Hypokalemia  Potassium level is stable.  Continue with potassium chloride 10 M EQ daily.      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 90 Tablet 1    FLUoxetine (PROZAC) 20 MG Cap Take 1 Capsule by mouth every day. 90 Capsule 3    zolpidem (AMBIEN) 10 MG Tab Take 1 Tablet by mouth at bedtime  "as needed for Sleep for up to 30 days. 30 Tablet 2    atorvastatin (LIPITOR) 20 MG Tab Take 1 tablet by mouth every day. 90 Tablet 2    Cholecalciferol (VITAMIN D3) 2000 UNIT Cap Take 1 capsule by mouth every day. 90 Capsule 2    levothyroxine (SYNTHROID) 100 MCG Tab Take 1 tablet by mouth every morning on an empty stomach. 90 Tablet 2    enalapril-hydrochlorthiazide (VASERETIC) 10-25 MG per tablet TAKE ONE TABLET BY MOUTH ONCE DAILY 90 Tablet 3    metFORMIN ER (GLUCOPHAGE XR) 500 MG TABLET SR 24 HR Take 2 tablets by mouth 2 times a day with meals. 360 Tablet 3    Empagliflozin (JARDIANCE) 10 MG Tab Take 1 tablet by mouth every day. 90 Tablet 3     No current Epic-ordered facility-administered medications on file.       Allergies:  Codeine    Health Maintenance: Reviewed. Colonoscopy has been ordered referral in process.       Objective:     Vital signs reviewed  Exam:  /62 (BP Location: Left arm, Patient Position: Sitting, BP Cuff Size: Adult)   Pulse 60   Temp 36 °C (96.8 °F) (Temporal)   Ht 1.575 m (5' 2\")   Wt 74.4 kg (164 lb)   SpO2 96%   BMI 30.00 kg/m²  Body mass index is 30 kg/m².    Gen: Alert and oriented, No apparent distress.  Neck: Neck is supple, full ROM.  Lungs: Normal effort, no audible wheezes  CV: Skin pink, warm and dry.  Ext: No clubbing, cyanosis, edema. Right posterior thigh with 30 mm length x 20 mm width mobile soft lump. Mobile soft palpable lump to posterior back.      Assessment & Plan:     62 y.o. female with the following -     1. Lump of skin of lower extremity, right  Chronic stable problem. Lump is mobile and suspect to be lipoma. Checking US. Discussed that if a lipoma and causing issues can refer her to general surgeon for removal.   - US-EXTREMITY NON VASCULAR UNILATERAL RIGHT; Future    2. Controlled type 2 diabetes mellitus without complication, without long-term current use of insulin (HCC)  Chronic stable problem. Keep upcoming endocrinology appointment. Continue " with metformin ER 1,000 mg twice daily and Empagliflozin 10 mg daily.     3. ARMEN (generalized anxiety disorder)  Chronic stable problem. Continue with fluoxetine 20 mg daily.   - FLUoxetine (PROZAC) 20 MG Cap; Take 1 Capsule by mouth every day.  Dispense: 90 Capsule; Refill: 3    4. Hypokalemia  Chronic stable problem. Continue with potassium chloride 10 mEq daily.   - potassium chloride ER (KLOR-CON) 10 MEQ tablet; Take 1 Tablet by mouth every day.  Dispense: 90 Tablet; Refill: 1      Return if symptoms worsen or fail to improve.    Please note that this dictation was created using voice recognition software. I have made every reasonable attempt to correct obvious errors, but I expect that there are errors of grammar and possibly content that I did not discover before finalizing the note.

## 2022-12-01 NOTE — ASSESSMENT & PLAN NOTE
Due for A1C but has an appointment in 1 day with endocrinology. She would like to wait to see endocrinology. She continues with metformin ER 1,000 mg twice daily and Empagliflozin 10 mg daily.

## 2022-12-02 ENCOUNTER — OFFICE VISIT (OUTPATIENT)
Dept: ENDOCRINOLOGY | Facility: MEDICAL CENTER | Age: 62
End: 2022-12-02
Attending: INTERNAL MEDICINE
Payer: COMMERCIAL

## 2022-12-02 VITALS
WEIGHT: 163 LBS | SYSTOLIC BLOOD PRESSURE: 124 MMHG | DIASTOLIC BLOOD PRESSURE: 70 MMHG | OXYGEN SATURATION: 96 % | HEART RATE: 72 BPM | HEIGHT: 62 IN | BODY MASS INDEX: 30 KG/M2

## 2022-12-02 DIAGNOSIS — E78.5 DYSLIPIDEMIA: ICD-10-CM

## 2022-12-02 DIAGNOSIS — E55.9 VITAMIN D DEFICIENCY: ICD-10-CM

## 2022-12-02 DIAGNOSIS — Z79.84 LONG TERM (CURRENT) USE OF ORAL HYPOGLYCEMIC DRUGS: ICD-10-CM

## 2022-12-02 DIAGNOSIS — E89.0 POSTOPERATIVE HYPOTHYROIDISM: ICD-10-CM

## 2022-12-02 DIAGNOSIS — E11.9 CONTROLLED TYPE 2 DIABETES MELLITUS WITHOUT COMPLICATION, WITHOUT LONG-TERM CURRENT USE OF INSULIN (HCC): ICD-10-CM

## 2022-12-02 LAB
HBA1C MFR BLD: 6.3 % (ref 0–5.6)
INT CON NEG: ABNORMAL
INT CON POS: ABNORMAL

## 2022-12-02 PROCEDURE — 99214 OFFICE O/P EST MOD 30 MIN: CPT | Performed by: INTERNAL MEDICINE

## 2022-12-02 PROCEDURE — RXMED WILLOW AMBULATORY MEDICATION CHARGE: Performed by: INTERNAL MEDICINE

## 2022-12-02 PROCEDURE — 99212 OFFICE O/P EST SF 10 MIN: CPT | Performed by: INTERNAL MEDICINE

## 2022-12-02 PROCEDURE — 83036 HEMOGLOBIN GLYCOSYLATED A1C: CPT | Performed by: INTERNAL MEDICINE

## 2022-12-02 RX ORDER — DULAGLUTIDE 1.5 MG/.5ML
0.5 INJECTION, SOLUTION SUBCUTANEOUS
Qty: 6 ML | Refills: 3 | Status: SHIPPED | OUTPATIENT
Start: 2022-12-02 | End: 2023-08-22

## 2022-12-02 ASSESSMENT — FIBROSIS 4 INDEX: FIB4 SCORE: 1.2

## 2022-12-02 NOTE — PROGRESS NOTES
CHIEF COMPLAINT: Patient is here for follow up of Type 2 Diabetes Mellitus.     HPI:     Koki Townsend is a 62 y.o. female with Type 2 Diabetes Mellitus here for follow up.    Labs from 12/2/2022 show a1c is 6.3%  Labs from 5/13/2022 show a1c is 5.8%  Labs from 10/25/2021 show a1c was 5.9%  Labs from  3/19/2021 show A1c was 5.9%  Labs from 12/15/2020 show A1c was 5.9%      Her comorbid conditions include chronic kidney disease stage III, hyperlipidemia, hypertension, obesity and obstructive sleep apnea   and history of anxiety.  She has a CPAP        On follow up she is on   Metformin ER 500mg 2 pills bid and   Jardiance 10 mg daily    She denies SE   She wants to start a GLP-1 for weight management      She has hyperlipidemia and is on Atorvastatin  She is tolerating this well  LDL-C was 60 on 10/2022      She has HTN that is controlled  She is taking an ACE inhibitor along with HCTZ plus Kdur  Blood pressure today is well controlled  Microalbuminuria is now resolved again  UACR was < 30 on 10/2022  UACR was 35 on 5/2022  UACR was 20 on 10/2021  Serum creatinine was 0.9 with a GFR 70 on 10/2022      She gets her eye exam with Dr. 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 Hines  with pathology returning as benign  She is currently on Levothyroxine 100mcg daily  BUT 1/2 Sunday only   which has been her dose for 6 months  She reports good compliance  She denies symptoms of uncontrolled hypothyroidism such as constipation cold intolerance  She also denies palpitations and tremors    TSH is 0.570 on 10/2022 (100mcg MN-SAT 1/2 SUN)  TSH was low at 0.220 on 5/9/2022 (100mcg daily)  TSH was 0.520 on 10/2021  TSH was normal 0.800 with a free T4 of 1.82 on 10/2021        She has a history of vitamin D deficiency and is taking vitamin D3 2000 units daily  Last vitamin D level was adequate at 55 on May 2022        BG Diary:  She  doesn't check her BG    Weight has been stable    Diabetes Complications   Retinopathy: No known retinopathy.  Last eye exam:  7/2022 with Dr. Mendoza  Neuropathy: Denies paresthesias or numbness in hands or feet. Denies any foot wounds.  Exercise: Minimal.  Diet: Fair.  Patient's medications, allergies, and social histories were reviewed and updated as appropriate.    ROS:     CONS:     No fever, no chills   EYES:     No diplopia, no blurry vision   CV:           No chest pain, no palpitations   PULM:     No SOB, no cough, no hemoptysis.   GI:            No nausea, no vomiting, no diarrhea, no constipation   ENDO:     No polyuria, no polydipsia, no heat intolerance, no cold intolerance       Past Medical History:  Problem List:  2022-12: Lump of skin of lower extremity, right  2022-05: Hypokalemia  2021-11: Controlled substance agreement signed  2021-11: Long term (current) use of oral hypoglycemic drugs  2020-09: Dyslipidemia  2020-07: Controlled type 2 diabetes mellitus without complication,   without long-term current use of insulin ([facility])  2020-07: Soft tissue mass  2020-06: Obesity (BMI 30-39.9)  2020-05: Elevated LFTs  2019-10: Breast cancer screening  2019-07: Anxiety  2018-01: ARMEN (generalized anxiety disorder)  2017-07: Hypothyroidism  2017-04: BMI 32.0-32.9,adult  2017-02: Insomnia  2017-02: Hypothyroidism  2017-01: Obstructive sleep apnea  2015-12: Impaired fasting blood sugar  2015-12: CKD (chronic kidney disease) stage 3, GFR 30-59 ml/min ([facility])  2015-12: Other specified hypothyroidism  2015-12: Essential hypertension  2012-12: Health examination of defined subpopulation  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    Past Surgical History:  Past Surgical History:   Procedure Laterality Date    SINUSCOPE      THYROIDECTOMY Left 2000's    Left lobe removed    TONSILLECTOMY      TUBAL LIGATION          Allergies:  Codeine     Social History:  Social History     Tobacco Use    Smoking  "status: Never    Smokeless tobacco: Never   Vaping Use    Vaping Use: Never used   Substance Use Topics    Alcohol use: No     Alcohol/week: 0.0 oz    Drug use: Not Currently        Family History:   family history includes Cancer in her father and paternal grandmother; Diabetes in her mother and sister; Heart Disease in her maternal grandmother and sister; Heart Failure in her mother; Lung Cancer in her father; No Known Problems in her brother; Sleep Apnea in her brother and sister.      PHYSICAL EXAM:   OBJECTIVE:  Vital signs: /70   Pulse 72   Ht 1.575 m (5' 2\")   Wt 73.9 kg (163 lb)   SpO2 96%   BMI 29.81 kg/m²   GENERAL: Well-developed, well-nourished in no apparent distress.   EYE:  No ocular asymmetry, PERRLA  HENT: Pink, moist mucous membranes.    NECK: No thyromegaly.   CARDIOVASCULAR:  No murmurs  LUNGS: Clear breath sounds  ABDOMEN: Soft, nontender   EXTREMITIES: No clubbing, cyanosis, or edema.   NEUROLOGICAL: No gross focal motor abnormalities   LYMPH: No cervical adenopathy seen  SKIN: No rashes, lesions.     Labs:  Lab Results   Component Value Date/Time    HBA1C 6.3 (A) 12/02/2022 07:33 AM        Lab Results   Component Value Date/Time    WBC 5.4 12/15/2020 06:05 AM    RBC 4.89 12/15/2020 06:05 AM    HEMOGLOBIN 13.8 12/15/2020 06:05 AM    MCV 87.1 12/15/2020 06:05 AM    MCH 28.2 12/15/2020 06:05 AM    MCHC 32.4 (L) 12/15/2020 06:05 AM    RDW 40.3 12/15/2020 06:05 AM    MPV 12.0 12/15/2020 06:05 AM       Lab Results   Component Value Date/Time    SODIUM 137 10/27/2022 09:07 AM    POTASSIUM 3.7 10/27/2022 09:07 AM    CHLORIDE 100 10/27/2022 09:07 AM    CO2 27 10/27/2022 09:07 AM    ANION 10.0 10/27/2022 09:07 AM    GLUCOSE 111 (H) 10/27/2022 09:07 AM    BUN 15 10/27/2022 09:07 AM    CREATININE 0.92 10/27/2022 09:07 AM    CALCIUM 10.1 10/27/2022 09:07 AM    ASTSGOT 43 10/27/2022 09:07 AM    ALTSGPT 77 (H) 10/27/2022 09:07 AM    TBILIRUBIN 0.9 10/27/2022 09:07 AM    ALBUMIN 4.5 10/27/2022 " 09:07 AM    TOTPROTEIN 7.4 10/27/2022 09:07 AM    GLOBULIN 2.9 10/27/2022 09:07 AM    AGRATIO 1.6 10/27/2022 09:07 AM       Lab Results   Component Value Date/Time    CHOLSTRLTOT 182 09/04/2020 0703    TRIGLYCERIDE 121 09/04/2020 0703    HDL 44 09/04/2020 0703     (H) 09/04/2020 0703       Lab Results   Component Value Date/Time    MALBCRT see below 10/27/2022 09:08 AM    MICROALBUR <1.2 10/27/2022 09:08 AM        Lab Results   Component Value Date/Time    TSHULTRASEN 0.681 12/15/2020 0605     No results found for: FREEDIR  No results found for: FREET3  No results found for: THYSTIMIG        ASSESSMENT/PLAN:     1. Controlled type 2 diabetes mellitus without complication, without long-term current use of insulin (HCC)  Well-controlled   Continue Metformin and Jardiance   start Trulicity 0.75 mg weekly to be increased after 1 month to 1.5 mg weekly  Reviewed proper administration of medication and side effects and alternatives  She is up to date with her labs  We will see her again in 6 months with repeat of her A1c and urine albumin  Follow-up with [place] in 6 months    2. Postoperative hypothyroidism  Controlled   Continue Levothyroxine 100 mcg daily BUT cut Sunday pill in 1/2  Repeat TSH levels in 6 months    3. Dyslipidemia  Well-controlled   Continue atorvastatin   Follow low-fat diet  Repeat fasting lipids after 6 months    4. Vitamin D deficiency  Controlled  Continue  vitamin D3 2000 IU daily  Stable   Vitamin D labs were reviewed with patient  Continue current supplements  Continue monitoring levels       5. Long term (current) use of oral hypoglycemic drugs  Patient is on oral agents for type 2 diabetes management      Return in about 6 months (around 6/2/2023).      Thank you kindly for allowing me to participate in the diabetes care plan for this patient.    Magnus Bourne MD, FACE, [facility]  12/22/20    CC:   DES Dye

## 2022-12-02 NOTE — PROGRESS NOTES
RN-CDE Note    Subjective:   Endocrinology Clinic Progress Note  PCP: DES Dye    HPI:  Koki Townsend is a 62 y.o. old patient who is seen today by the Diabetes Nurse Specialist for review of Type 2 Diabetes.  Recent changes in health: Health good.  COVID in November.  DM:   Last A1c:   Lab Results   Component Value Date/Time    HBA1C 5.8 (A) 05/13/2022 09:28 AM      Previous A1c was 5.8 on 5/13/22  A1C GOAL: < 7    Diabetes Medications:   Jardiance 10 mg daily  Metformin  mg 2 BID      Exercise: Walking and [place] bike  Diet: Eating more over the holidays but trying to eat fairly healthy   Patient's body mass index is 29.81 kg/m². Exercise and nutrition counseling were performed at this visit.    Glucose monitoring frequency: Not testing blood sugars    Hypoglycemic episodes: no  Last Retinal Exam: on file and up-to-date  Daily Foot Exam: Yes   Foot Exam:  Monofilament: done  Lab Results   Component Value Date/Time    MALBCRT see below 10/27/2022 09:08 AM    MICROALBUR <1.2 10/27/2022 09:08 AM        ACR Albumin/Creatinine Ratio goal <30     HTN:   Blood pressure goal <140/<80 .   Currently Rx ACE/ARB: Yes     Dyslipidemia:    Lab Results   Component Value Date/Time    CHOLSTRLTOT 128 10/27/2022 09:07 AM    LDL 60 10/27/2022 09:07 AM    HDL 45 10/27/2022 09:07 AM    TRIGLYCERIDE 117 10/27/2022 09:07 AM         Currently Rx Statin: Yes       She  reports that she has never smoked. She has never used smokeless tobacco.      Plan:     Discussed and educated on:   - All medications, side effects and compliance (discussed carefully)  - Annual eye examinations at Ophthalmology  - Home glucose monitoring emphasized  - Weight control and daily exercise    Recommended medication changes: She is interested in going on Trulicity.

## 2022-12-09 ENCOUNTER — PHARMACY VISIT (OUTPATIENT)
Dept: PHARMACY | Facility: MEDICAL CENTER | Age: 62
End: 2022-12-09
Payer: COMMERCIAL

## 2022-12-09 PROCEDURE — RXMED WILLOW AMBULATORY MEDICATION CHARGE: Performed by: INTERNAL MEDICINE

## 2022-12-13 ENCOUNTER — PHARMACY VISIT (OUTPATIENT)
Dept: PHARMACY | Facility: MEDICAL CENTER | Age: 62
End: 2022-12-13
Payer: COMMERCIAL

## 2022-12-27 ENCOUNTER — HOSPITAL ENCOUNTER (OUTPATIENT)
Dept: RADIOLOGY | Facility: MEDICAL CENTER | Age: 62
End: 2022-12-27
Attending: NURSE PRACTITIONER
Payer: COMMERCIAL

## 2022-12-27 DIAGNOSIS — R22.41 LUMP OF SKIN OF LOWER EXTREMITY, RIGHT: ICD-10-CM

## 2022-12-27 PROCEDURE — 76882 US LMTD JT/FCL EVL NVASC XTR: CPT | Mod: RT

## 2023-01-23 PROCEDURE — RXMED WILLOW AMBULATORY MEDICATION CHARGE: Performed by: INTERNAL MEDICINE

## 2023-01-23 PROCEDURE — RXMED WILLOW AMBULATORY MEDICATION CHARGE: Performed by: NURSE PRACTITIONER

## 2023-01-24 ENCOUNTER — PHARMACY VISIT (OUTPATIENT)
Dept: PHARMACY | Facility: MEDICAL CENTER | Age: 63
End: 2023-01-24
Payer: COMMERCIAL

## 2023-02-08 PROCEDURE — RXMED WILLOW AMBULATORY MEDICATION CHARGE: Performed by: INTERNAL MEDICINE

## 2023-02-08 PROCEDURE — RXMED WILLOW AMBULATORY MEDICATION CHARGE: Performed by: NURSE PRACTITIONER

## 2023-02-13 ENCOUNTER — PHARMACY VISIT (OUTPATIENT)
Dept: PHARMACY | Facility: MEDICAL CENTER | Age: 63
End: 2023-02-13
Payer: COMMERCIAL

## 2023-02-22 ENCOUNTER — OFFICE VISIT (OUTPATIENT)
Dept: MEDICAL GROUP | Facility: PHYSICIAN GROUP | Age: 63
End: 2023-02-22
Payer: COMMERCIAL

## 2023-02-22 ENCOUNTER — PATIENT MESSAGE (OUTPATIENT)
Dept: MEDICAL GROUP | Facility: PHYSICIAN GROUP | Age: 63
End: 2023-02-22

## 2023-02-22 VITALS
DIASTOLIC BLOOD PRESSURE: 60 MMHG | HEART RATE: 72 BPM | SYSTOLIC BLOOD PRESSURE: 110 MMHG | WEIGHT: 157 LBS | BODY MASS INDEX: 28.89 KG/M2 | OXYGEN SATURATION: 95 % | TEMPERATURE: 97.6 F | HEIGHT: 62 IN

## 2023-02-22 DIAGNOSIS — G47.00 INSOMNIA, UNSPECIFIED TYPE: Chronic | ICD-10-CM

## 2023-02-22 PROCEDURE — 99213 OFFICE O/P EST LOW 20 MIN: CPT | Performed by: NURSE PRACTITIONER

## 2023-02-22 RX ORDER — ZOLPIDEM TARTRATE 10 MG/1
10 TABLET ORAL NIGHTLY PRN
Qty: 30 TABLET | Refills: 2 | Status: SHIPPED | OUTPATIENT
Start: 2023-02-22 | End: 2023-03-24

## 2023-02-22 ASSESSMENT — PATIENT HEALTH QUESTIONNAIRE - PHQ9: CLINICAL INTERPRETATION OF PHQ2 SCORE: 0

## 2023-02-23 RX ORDER — ZOLPIDEM TARTRATE 10 MG/1
10 TABLET ORAL NIGHTLY PRN
Qty: 30 TABLET | Refills: 0 | Status: SHIPPED | OUTPATIENT
Start: 2023-02-23 | End: 2023-03-26

## 2023-02-23 NOTE — ASSESSMENT & PLAN NOTE
Her last dose was last night. She has been taking zolpidem 10 mg nightly as needed. She is using her CPAP and is averaging 8-9 hours of sleep. She does not feel groggy or tired with taking the medication.  She does need a medication refill today.

## 2023-02-23 NOTE — PROGRESS NOTES
Subjective:     CC: Medication management    HPI:   FREDDY presents today with the following:    Insomnia  Her last dose was last night. She has been taking zolpidem 10 mg nightly as needed. She is using her CPAP and is averaging 8-9 hours of sleep. She does not feel groggy or tired with taking the medication.  She does need a medication refill today.        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 30 Tablet 2    COVID-19mRNA Bival Vacc Pfizer (PFIZER COVID-19 BIVALENT) 30 MCG/0.3ML Suspension injection Inject  into the shoulder, thigh, or buttocks. 0.3 mL 0    Dulaglutide (TRULICITY) 1.5 MG/0.5ML Solution Pen-injector Inject 0.5 mL under the skin every 7 days. 6 mL 3    potassium chloride ER (KLOR-CON) 10 MEQ tablet Take 1 Tablet by mouth every day. 90 Tablet 1    FLUoxetine (PROZAC) 20 MG Cap Take 1 Capsule by mouth every day. 90 Capsule 3    atorvastatin (LIPITOR) 20 MG Tab Take 1 tablet by mouth every day. 90 Tablet 2    Cholecalciferol (VITAMIN D3) 2000 UNIT Cap Take 1 capsule by mouth every day. 90 Capsule 2    levothyroxine (SYNTHROID) 100 MCG Tab Take 1 tablet by mouth every morning on an empty stomach. 90 Tablet 2    enalapril-hydrochlorthiazide (VASERETIC) 10-25 MG  "per tablet TAKE ONE TABLET BY MOUTH ONCE DAILY 90 Tablet 3    metFORMIN ER (GLUCOPHAGE XR) 500 MG TABLET SR 24 HR Take 2 tablets by mouth 2 times a day with meals. 360 Tablet 3    Empagliflozin (JARDIANCE) 10 MG Tab Take 1 tablet by mouth every day. 90 Tablet 3     No current Epic-ordered facility-administered medications on file.       Allergies:  Codeine    Health Maintenance: reviewed. She has colonoscopy prep and will schedule colonoscopy. She has an appointment 7/2023 with [facility].       Objective:     Vital signs reviewed  Exam:  /60 (BP Location: Left arm, Patient Position: Sitting, BP Cuff Size: Adult)   Pulse 72   Temp 36.4 °C (97.6 °F) (Temporal)   Ht 1.575 m (5' 2\")   Wt 71.2 kg (157 lb)   SpO2 95%   BMI 28.72 kg/m²  Body mass index is 28.72 kg/m².    Gen: Alert and oriented, No apparent distress.  Neck: Neck is supple, full ROM.  Lungs: Normal effort, no audible wheezes  CV: Skin pink, warm and dry.  Ext: No clubbing, cyanosis, edema.      Assessment & Plan:     62 y.o. female with the following -     1. Insomnia, unspecified type  Chronic stable problem.  Continue with zolpidem 10 mg at bedtime as needed.  Plan to update UDS and controlled substance treatment agreement at next appointment as she will be due.   reviewed today.  Medication electronically refilled today.  Continue with healthy diet and exercise.  Return every 3 months for medication refills.  - zolpidem (AMBIEN) 10 MG Tab; Take 1 Tablet by mouth at bedtime as needed for Sleep for up to 30 days.  Dispense: 30 Tablet; Refill: 2       Return in 3 months (on 5/22/2023) for medication management, UDS.    Please note that this dictation was created using voice recognition software. I have made every reasonable attempt to correct obvious errors, but I expect that there are errors of grammar and possibly content that I did not discover before finalizing the note.        "

## 2023-02-24 ENCOUNTER — PHARMACY VISIT (OUTPATIENT)
Dept: PHARMACY | Facility: MEDICAL CENTER | Age: 63
End: 2023-02-24
Payer: COMMERCIAL

## 2023-02-24 PROCEDURE — RXMED WILLOW AMBULATORY MEDICATION CHARGE: Performed by: NURSE PRACTITIONER

## 2023-02-24 NOTE — PROGRESS NOTES
Requested Prescriptions     Signed Prescriptions Disp Refills    zolpidem (AMBIEN) 10 MG Tab 30 Tablet 0     Sig: Take 1 Tablet by mouth at bedtime as needed for Sleep for up to 30 days.     Authorizing Provider: JAMEE BURK     Per patient pharmacy I sent the prescription to yesterday we will be out of the medication for a week.  Requesting alternative pharmacy to give prescription.  Refill sent to [facility].    Jamee Burk A.P.R.N.

## 2023-02-27 PROCEDURE — RXMED WILLOW AMBULATORY MEDICATION CHARGE: Performed by: INTERNAL MEDICINE

## 2023-03-02 ENCOUNTER — PHARMACY VISIT (OUTPATIENT)
Dept: PHARMACY | Facility: MEDICAL CENTER | Age: 63
End: 2023-03-02
Payer: COMMERCIAL

## 2023-04-17 PROCEDURE — RXMED WILLOW AMBULATORY MEDICATION CHARGE: Performed by: INTERNAL MEDICINE

## 2023-04-17 PROCEDURE — RXMED WILLOW AMBULATORY MEDICATION CHARGE: Performed by: NURSE PRACTITIONER

## 2023-04-19 ENCOUNTER — PHARMACY VISIT (OUTPATIENT)
Dept: PHARMACY | Facility: MEDICAL CENTER | Age: 63
End: 2023-04-19
Payer: COMMERCIAL

## 2023-05-16 PROCEDURE — RXMED WILLOW AMBULATORY MEDICATION CHARGE: Performed by: NURSE PRACTITIONER

## 2023-05-16 PROCEDURE — RXMED WILLOW AMBULATORY MEDICATION CHARGE: Performed by: INTERNAL MEDICINE

## 2023-05-17 ENCOUNTER — PHARMACY VISIT (OUTPATIENT)
Dept: PHARMACY | Facility: MEDICAL CENTER | Age: 63
End: 2023-05-17
Payer: COMMERCIAL

## 2023-05-18 ENCOUNTER — OFFICE VISIT (OUTPATIENT)
Dept: MEDICAL GROUP | Facility: PHYSICIAN GROUP | Age: 63
End: 2023-05-18
Payer: COMMERCIAL

## 2023-05-18 VITALS
HEART RATE: 60 BPM | SYSTOLIC BLOOD PRESSURE: 124 MMHG | WEIGHT: 158 LBS | TEMPERATURE: 97.4 F | BODY MASS INDEX: 29.08 KG/M2 | RESPIRATION RATE: 16 BRPM | OXYGEN SATURATION: 96 % | HEIGHT: 62 IN | DIASTOLIC BLOOD PRESSURE: 72 MMHG

## 2023-05-18 DIAGNOSIS — G47.00 INSOMNIA, UNSPECIFIED TYPE: Chronic | ICD-10-CM

## 2023-05-18 DIAGNOSIS — E11.9 CONTROLLED TYPE 2 DIABETES MELLITUS WITHOUT COMPLICATION, WITHOUT LONG-TERM CURRENT USE OF INSULIN (HCC): ICD-10-CM

## 2023-05-18 LAB
HBA1C MFR BLD: 6.1 % (ref ?–5.8)
POCT INT CON NEG: NEGATIVE
POCT INT CON POS: POSITIVE

## 2023-05-18 PROCEDURE — 3078F DIAST BP <80 MM HG: CPT | Performed by: NURSE PRACTITIONER

## 2023-05-18 PROCEDURE — 3074F SYST BP LT 130 MM HG: CPT | Performed by: NURSE PRACTITIONER

## 2023-05-18 PROCEDURE — 83036 HEMOGLOBIN GLYCOSYLATED A1C: CPT | Performed by: NURSE PRACTITIONER

## 2023-05-18 PROCEDURE — 99214 OFFICE O/P EST MOD 30 MIN: CPT | Performed by: NURSE PRACTITIONER

## 2023-05-18 RX ORDER — ZOLPIDEM TARTRATE 10 MG/1
10 TABLET ORAL NIGHTLY PRN
Qty: 30 TABLET | Refills: 2 | Status: SHIPPED | OUTPATIENT
Start: 2023-05-18 | End: 2023-08-16

## 2023-05-18 NOTE — ASSESSMENT & PLAN NOTE
Dur for A1c today, last 6.3%. She is trying to increase her walking. She continues with metformin ER 1000 mg twice daily, empagliflozin 10 mg daily and trulicity 1.5 mg every 7 days. Continues follow-up with endocrinology.

## 2023-05-18 NOTE — PROGRESS NOTES
Subjective:     CC: medication management    HPI:   FREDDY presents today with the following:    Insomnia  She continues with zolpidem 10 mg nightly as needed. Her last dose was last night. Due for updated UDS and controlled substance treatment agreement. Last refill 4/25/2023 for #30. She is waking up rested. She is using her CPAP nightly and averaging 8-9 hours of sleep. She is tolerating the medication.     Controlled type 2 diabetes mellitus without complication, without long-term current use of insulin ([facility])  Dur for A1c today, last 6.3%. She is trying to increase her walking. She continues with metformin ER 1000 mg twice daily, empagliflozin 10 mg daily and trulicity 1.5 mg every 7 days. Continues follow-up with endocrinology.     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 30 Tablet 2    Dulaglutide (TRULICITY) 1.5 MG/0.5ML Solution Pen-injector Inject 0.5 mL under the skin every 7 days. 6 mL 3    potassium chloride ER (KLOR-CON) 10 MEQ tablet Take 1 Tablet by mouth every day. 90 Tablet 1    FLUoxetine (PROZAC) 20 MG Cap Take 1 Capsule by mouth every day. 90 Capsule 3    atorvastatin (LIPITOR) 20 MG Tab Take 1 tablet by mouth every day. 90 Tablet 2    Cholecalciferol  "(VITAMIN D3) 2000 UNIT Cap Take 1 capsule by mouth every day. 90 Capsule 2    levothyroxine (SYNTHROID) 100 MCG Tab Take 1 tablet by mouth every morning on an empty stomach. 90 Tablet 2    enalapril-hydrochlorthiazide (VASERETIC) 10-25 MG per tablet TAKE ONE TABLET BY MOUTH ONCE DAILY 90 Tablet 3    metFORMIN ER (GLUCOPHAGE XR) 500 MG TABLET SR 24 HR Take 2 tablets by mouth 2 times a day with meals. 360 Tablet 3    Empagliflozin (JARDIANCE) 10 MG Tab tablet Take 1 tablet by mouth every day. 90 Tablet 3    COVID-19mRNA Bival Vacc Pfizer (PFIZER COVID-19 BIVALENT) 30 MCG/0.3ML Suspension injection Inject  into the shoulder, thigh, or buttocks. 0.3 mL 0     No current Epic-ordered facility-administered medications on file.       Allergies:  Codeine    Health Maintenance: Reviewed      Objective:     Vital signs reviewed  Exam:  /72 (BP Location: Left arm, Patient Position: Sitting, BP Cuff Size: Adult)   Pulse 60   Temp 36.3 °C (97.4 °F) (Temporal)   Resp 16   Ht 1.575 m (5' 2\")   Wt 71.7 kg (158 lb)   SpO2 96%   BMI 28.90 kg/m²  Body mass index is 28.9 kg/m².    Gen: Alert and oriented, No apparent distress.  Neck: Neck is supple, full ROM.  Lungs: Normal effort, no audible wheezes  CV: Skin pink, warm and dry.  Ext: No clubbing, cyanosis, edema.        Labs:      Latest Reference Range & Units 05/18/23 07:26   Glycohemoglobin 5.8 % 6.1 !   !: Data is abnormal    Assessment & Plan:     62 y.o. female with the following -     1. Insomnia, unspecified type  Chronic stable problem.  Continue with zolpidem 10 mg nightly at bedtime as needed.  Continue nightly CPAP.  UDS collected in office today and controlled substance treatment agreement updated today.   reviewed today.  Medication electronically refilled today.  Return every 3 months for medication refill.  - Pain Management Screen; Future  - Controlled Substance Treatment Agreement  - zolpidem (AMBIEN) 10 MG Tab; Take 1 Tablet by mouth at bedtime as " needed for Sleep for up to 90 days.  Dispense: 30 Tablet; Refill: 2    2. Controlled type 2 diabetes mellitus without complication, without long-term current use of insulin (HCC)  Chronic stable problem.  A1c at goal today.  Continue follow-up with endocrinology.  Continue  metformin ER 1000 mg twice daily, empagliflozin 10 mg daily and trulicity 1.5 mg every 7 days.  - POCT  A1C        Return in about 3 months (around 8/18/2023) for medication management.    Please note that this dictation was created using voice recognition software. I have made every reasonable attempt to correct obvious errors, but I expect that there are errors of grammar and possibly content that I did not discover before finalizing the note.

## 2023-05-18 NOTE — ASSESSMENT & PLAN NOTE
She continues with zolpidem 10 mg nightly as needed. Her last dose was last night. Due for updated UDS and controlled substance treatment agreement. Last refill 4/25/2023 for #30. She is waking up rested. She is using her CPAP nightly and averaging 8-9 hours of sleep. She is tolerating the medication.

## 2023-05-23 ENCOUNTER — PHARMACY VISIT (OUTPATIENT)
Dept: PHARMACY | Facility: MEDICAL CENTER | Age: 63
End: 2023-05-23
Payer: COMMERCIAL

## 2023-05-23 PROCEDURE — RXMED WILLOW AMBULATORY MEDICATION CHARGE: Performed by: INTERNAL MEDICINE

## 2023-05-30 ENCOUNTER — HOSPITAL ENCOUNTER (OUTPATIENT)
Dept: LAB | Facility: MEDICAL CENTER | Age: 63
End: 2023-05-30
Attending: INTERNAL MEDICINE
Payer: COMMERCIAL

## 2023-05-30 DIAGNOSIS — E78.5 DYSLIPIDEMIA: ICD-10-CM

## 2023-05-30 DIAGNOSIS — E89.0 POSTOPERATIVE HYPOTHYROIDISM: ICD-10-CM

## 2023-05-30 DIAGNOSIS — E11.9 CONTROLLED TYPE 2 DIABETES MELLITUS WITHOUT COMPLICATION, WITHOUT LONG-TERM CURRENT USE OF INSULIN (HCC): ICD-10-CM

## 2023-05-30 DIAGNOSIS — E55.9 VITAMIN D DEFICIENCY: ICD-10-CM

## 2023-05-30 DIAGNOSIS — Z79.84 LONG TERM (CURRENT) USE OF ORAL HYPOGLYCEMIC DRUGS: ICD-10-CM

## 2023-05-30 LAB
25(OH)D3 SERPL-MCNC: 52 NG/ML (ref 30–100)
ALBUMIN SERPL BCP-MCNC: 4.2 G/DL (ref 3.2–4.9)
ALBUMIN/GLOB SERPL: 1.6 G/DL
ALP SERPL-CCNC: 88 U/L (ref 30–99)
ALT SERPL-CCNC: 54 U/L (ref 2–50)
ANION GAP SERPL CALC-SCNC: 13 MMOL/L (ref 7–16)
AST SERPL-CCNC: 35 U/L (ref 12–45)
BILIRUB SERPL-MCNC: 0.9 MG/DL (ref 0.1–1.5)
BUN SERPL-MCNC: 18 MG/DL (ref 8–22)
CALCIUM ALBUM COR SERPL-MCNC: 9.3 MG/DL (ref 8.5–10.5)
CALCIUM SERPL-MCNC: 9.5 MG/DL (ref 8.5–10.5)
CHLORIDE SERPL-SCNC: 102 MMOL/L (ref 96–112)
CHOLEST SERPL-MCNC: 113 MG/DL (ref 100–199)
CO2 SERPL-SCNC: 24 MMOL/L (ref 20–33)
CREAT SERPL-MCNC: 0.99 MG/DL (ref 0.5–1.4)
CREAT UR-MCNC: 137.02 MG/DL
FASTING STATUS PATIENT QL REPORTED: NORMAL
GFR SERPLBLD CREATININE-BSD FMLA CKD-EPI: 64 ML/MIN/1.73 M 2
GLOBULIN SER CALC-MCNC: 2.7 G/DL (ref 1.9–3.5)
GLUCOSE SERPL-MCNC: 96 MG/DL (ref 65–99)
HDLC SERPL-MCNC: 44 MG/DL
LDLC SERPL CALC-MCNC: 45 MG/DL
MICROALBUMIN UR-MCNC: <1.2 MG/DL
MICROALBUMIN/CREAT UR: NORMAL MG/G (ref 0–30)
POTASSIUM SERPL-SCNC: 3.6 MMOL/L (ref 3.6–5.5)
PROT SERPL-MCNC: 6.9 G/DL (ref 6–8.2)
SODIUM SERPL-SCNC: 139 MMOL/L (ref 135–145)
T4 FREE SERPL-MCNC: 1.5 NG/DL (ref 0.93–1.7)
TRIGL SERPL-MCNC: 119 MG/DL (ref 0–149)
TSH SERPL DL<=0.005 MIU/L-ACNC: 0.87 UIU/ML (ref 0.38–5.33)

## 2023-05-30 PROCEDURE — 82570 ASSAY OF URINE CREATININE: CPT

## 2023-05-30 PROCEDURE — 84443 ASSAY THYROID STIM HORMONE: CPT

## 2023-05-30 PROCEDURE — 82043 UR ALBUMIN QUANTITATIVE: CPT

## 2023-05-30 PROCEDURE — 84439 ASSAY OF FREE THYROXINE: CPT

## 2023-05-30 PROCEDURE — 82306 VITAMIN D 25 HYDROXY: CPT

## 2023-05-30 PROCEDURE — 36415 COLL VENOUS BLD VENIPUNCTURE: CPT

## 2023-05-30 PROCEDURE — 80053 COMPREHEN METABOLIC PANEL: CPT

## 2023-05-30 PROCEDURE — 80061 LIPID PANEL: CPT

## 2023-06-06 ENCOUNTER — NON-PROVIDER VISIT (OUTPATIENT)
Dept: ENDOCRINOLOGY | Facility: MEDICAL CENTER | Age: 63
End: 2023-06-06
Attending: INTERNAL MEDICINE
Payer: COMMERCIAL

## 2023-06-06 VITALS
DIASTOLIC BLOOD PRESSURE: 68 MMHG | HEART RATE: 68 BPM | WEIGHT: 159 LBS | SYSTOLIC BLOOD PRESSURE: 102 MMHG | BODY MASS INDEX: 29.26 KG/M2 | OXYGEN SATURATION: 97 % | HEIGHT: 62 IN

## 2023-06-06 DIAGNOSIS — E11.9 CONTROLLED TYPE 2 DIABETES MELLITUS WITHOUT COMPLICATION, WITHOUT LONG-TERM CURRENT USE OF INSULIN (HCC): ICD-10-CM

## 2023-06-06 PROCEDURE — 99212 OFFICE O/P EST SF 10 MIN: CPT | Performed by: INTERNAL MEDICINE

## 2023-06-06 RX ORDER — DULAGLUTIDE 3 MG/.5ML
3 INJECTION, SOLUTION SUBCUTANEOUS
Qty: 6 ML | Refills: 3 | Status: SHIPPED | OUTPATIENT
Start: 2023-06-06

## 2023-06-06 RX ORDER — METFORMIN HYDROCHLORIDE 500 MG/1
TABLET, EXTENDED RELEASE ORAL 2 TIMES DAILY WITH MEALS
Qty: 360 TABLET | Refills: 3 | Status: SHIPPED | OUTPATIENT
Start: 2023-06-06

## 2023-06-06 RX ORDER — EMPAGLIFLOZIN 10 MG/1
TABLET, FILM COATED ORAL DAILY
Qty: 90 TABLET | Refills: 3 | Status: SHIPPED | OUTPATIENT
Start: 2023-06-06

## 2023-06-06 NOTE — PROGRESS NOTES
RN-CDE Note    Subjective:   Endocrinology Clinic Progress Note  PCP: DES Dye    HPI:  FREDDY ARROYO is a 62 y.o. old patient who is seen today by the Diabetes Nurse Specialist for review of Type 2 Diabetes and Hypothyroidism.  Recent changes in health: Health good.  DM:   Last A1c:   Lab Results   Component Value Date/Time    HBA1C 6.1 (A) 05/18/2023 07:26 AM      Previous A1c was 6.3 on 12/2/22  A1C GOAL: < 7    Diabetes Medications:   Trulicity 1.5 mg weekly  Metformin  mg 2 BID  Jardiance 10 mg daily      Exercise: Walk  Diet: Having increase in hunger and would like to increase Trulicity  Patient's body mass index is unknown because there is no height or weight on file. Exercise and nutrition counseling were performed at this visit.    Glucose monitoring frequency: Not testing    Hypoglycemic episodes: no  Last Retinal Exam: on file and up-to-date  Daily Foot Exam: Yes   Foot Exam:  Monofilament: done  Lab Results   Component Value Date/Time    MALBCRT see below 05/30/2023 06:05 AM    MICROALBUR <1.2 05/30/2023 06:05 AM        ACR Albumin/Creatinine Ratio goal <30     HTN:   Blood pressure goal <130/<80 .   Currently Rx ACE/ARB: Yes     Dyslipidemia:    Lab Results   Component Value Date/Time    CHOLSTRLTOT 113 05/30/2023 06:05 AM    LDL 45 05/30/2023 06:05 AM    HDL 44 05/30/2023 06:05 AM    TRIGLYCERIDE 119 05/30/2023 06:05 AM         Currently Rx Statin: Yes     She  reports that she has never smoked. She has never used smokeless tobacco.      Plan:     Discussed and educated on:   - All medications, side effects and compliance (discussed carefully)  - Annual eye examinations at Ophthalmology  - Home glucose monitoring emphasized  - Weight control and daily exercise    Recommended medication changes: Increase Trulicity 3 mg weekly.  She will follow up with Dr. Bourne in 6 months.

## 2023-07-07 ENCOUNTER — TELEPHONE (OUTPATIENT)
Dept: PHARMACY | Facility: MEDICAL CENTER | Age: 63
End: 2023-07-07
Payer: COMMERCIAL

## 2023-07-07 PROCEDURE — RXMED WILLOW AMBULATORY MEDICATION CHARGE: Performed by: INTERNAL MEDICINE

## 2023-07-07 NOTE — TELEPHONE ENCOUNTER
Spoke with patient asked if she was ready for us mail out her refills she stated yes I verired her delivery addres and CC on file, went ahead and sent Delivery slip to pharmacy delivery date 07/11/23    Masha Dunn, Pharmacy Liasion/ RX Coordinator

## 2023-07-10 ENCOUNTER — PHARMACY VISIT (OUTPATIENT)
Dept: PHARMACY | Facility: MEDICAL CENTER | Age: 63
End: 2023-07-10
Payer: COMMERCIAL

## 2023-07-18 ENCOUNTER — OFFICE VISIT (OUTPATIENT)
Dept: SLEEP MEDICINE | Facility: MEDICAL CENTER | Age: 63
End: 2023-07-18
Attending: NURSE PRACTITIONER
Payer: COMMERCIAL

## 2023-07-18 VITALS
DIASTOLIC BLOOD PRESSURE: 72 MMHG | OXYGEN SATURATION: 92 % | BODY MASS INDEX: 28.69 KG/M2 | WEIGHT: 155.9 LBS | HEIGHT: 62 IN | SYSTOLIC BLOOD PRESSURE: 122 MMHG | HEART RATE: 73 BPM | RESPIRATION RATE: 16 BRPM

## 2023-07-18 DIAGNOSIS — Z78.9 NONSMOKER: ICD-10-CM

## 2023-07-18 DIAGNOSIS — G47.00 INSOMNIA, UNSPECIFIED TYPE: Chronic | ICD-10-CM

## 2023-07-18 DIAGNOSIS — F41.1 GENERALIZED ANXIETY DISORDER: ICD-10-CM

## 2023-07-18 DIAGNOSIS — G47.33 OBSTRUCTIVE SLEEP APNEA: Chronic | ICD-10-CM

## 2023-07-18 DIAGNOSIS — I10 ESSENTIAL HYPERTENSION: ICD-10-CM

## 2023-07-18 PROBLEM — E66.9 OBESITY (BMI 30-39.9): Status: RESOLVED | Noted: 2020-06-30 | Resolved: 2023-07-18

## 2023-07-18 PROCEDURE — 99213 OFFICE O/P EST LOW 20 MIN: CPT | Performed by: NURSE PRACTITIONER

## 2023-07-18 PROCEDURE — 3078F DIAST BP <80 MM HG: CPT | Performed by: NURSE PRACTITIONER

## 2023-07-18 PROCEDURE — 3074F SYST BP LT 130 MM HG: CPT | Performed by: NURSE PRACTITIONER

## 2023-07-18 NOTE — PROGRESS NOTES
Chief Complaint   Patient presents with    Follow-Up     Apnea // Last Seen 6/13/2022   BIPAP 21/16       HPI:  FREDDY ARROYO is a 62 y.o. year old female here today for follow-up on EVI.  Last OV 6/13/22     Currently using BIPAP @ 16/12cm H20 nightly; RESPIRONICS; device obtained obtained 2017.  Compliance report 6/18/2023 through 7/17/2023 indicates 100% compliance, average nightly use 8 hours 36 minutes, minimal mask leak with reduced AHI of 4.5/h.  Reviewed with patient. She goes to bed between 9/10pm, falls asleep with Ambien <30min, then will briefly wake to touch the ramp on her device for comfort which she believes an average of 2-3 times per night but does not fully wake up and then resume sleep until 6 AM.  She denies waking with shortness of breath, chest pain, chest tightness, headaches, GERD or palpitations.  No significant changes in health over the last year.  Overall she feels her sleep quality is a little impaired due to waking softened use the ramp from mask discomfort but her daytime energy levels are consistent and she is not sleepy.    She does have a history of insomnia and continues Ambien 10 mg nightly.  She notes sleeping through the night with use.  PCP currently prescribes.  She is compliant with use.    Sleep hx:  PSG split-night 1/11/2017 indicates severe sleep apnea with an AHI of 94.6 and a minimum oxygen saturation of 77%. Titration study 2/28/17 noted best response to BiPAP 24/18 cm with a reduced AHI of 3.1/h and O2 peter 92%.  Small Jenny view mask was used.  Currently using BIPAP 21/16cm, biflex 3. Device obtained 2017.      ROS: As per HPI and otherwise negative if not stated.    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      Sleep apnea     Soft tissue mass 7/3/2020    Thyroid disease     Tonsillitis        Past Surgical History:   Procedure Laterality Date    SINUSCOPE      THYROIDECTOMY Left 2000's    Left lobe removed    TONSILLECTOMY      TUBAL LIGATION         Family History   Problem Relation Age of Onset    Heart Failure Mother     Diabetes Mother     Cancer Father         lung-abest    Lung Cancer Father     Diabetes Sister     Sleep Apnea Sister     Heart Disease Sister     Sleep Apnea Brother     No Known Problems Brother     Heart Disease Maternal Grandmother         heart valve    Cancer Paternal Grandmother         liver    Stroke Neg Hx        Social History     Socioeconomic History    Marital status: Single     Spouse name: Not on file    Number of children: Not on file    Years of education: Not on file    Highest education level: Some college, no degree   Occupational History    Not on file   Tobacco Use    Smoking status: Never    Smokeless tobacco: Never   Vaping Use    Vaping Use: Never used   Substance and Sexual Activity    Alcohol use: No     Alcohol/week: 0.0 oz    Drug use: Not Currently    Sexual activity: Not Currently     Partners: Male     Birth control/protection: Surgical   Other Topics Concern    Not on file   Social History Narrative    Not on file     Social Determinants of Health     Financial Resource Strain: Low Risk  (9/17/2020)    Overall Financial Resource Strain (CARDIA)     Difficulty of Paying Living Expenses: Not hard at all   Food Insecurity: No Food Insecurity (9/17/2020)    Hunger Vital Sign     Worried About Running Out of Food in the Last Year: Never true     Ran Out of Food in the Last Year: Never true   Transportation Needs: No Transportation Needs (9/17/2020)    PRAPARE - Transportation     Lack of Transportation (Medical): No     Lack of Transportation (Non-Medical): No   Physical Activity: Sufficiently Active (9/17/2020)    Exercise Vital Sign     Days of Exercise per Week: 5 days  "    Minutes of Exercise per Session: 40 min   Stress: No Stress Concern Present (9/17/2020)    [employer] [place] [employer] - Occupational Stress Questionnaire     Feeling of Stress : Only a little   Social Connections: Socially Isolated (9/17/2020)    Social Connection and Isolation Panel [NHANES]     Frequency of Communication with Friends and Family: More than three times a week     Frequency of Social Gatherings with Friends and Family: Once a week     Attends Protestant Services: Never     Active Member of Clubs or Organizations: No     Attends Club or Organization Meetings: Never     Marital Status:    Intimate Partner Violence: Not on file   Housing Stability: Low Risk  (9/17/2020)    Housing Stability Vital Sign     Unable to Pay for Housing in the Last Year: No     Number of Places Lived in the Last Year: 1     Unstable Housing in the Last Year: No       Allergies as of 07/18/2023 - Reviewed 07/18/2023   Allergen Reaction Noted    Codeine Itching 12/18/2012        Vitals:  /72 (BP Location: Left arm, Patient Position: Sitting, BP Cuff Size: Adult)   Pulse 73   Resp 16   Ht 1.575 m (5' 2\")   Wt 70.7 kg (155 lb 14.4 oz)   SpO2 92%     Current medications as of today   Current Outpatient Medications   Medication Sig Dispense Refill    Dulaglutide (TRULICITY) 3 MG/0.5ML Solution Pen-injector Inject 3 mg under the skin every 7 days. 6 mL 3    metFORMIN ER (GLUCOPHAGE XR) 500 MG TABLET SR 24 HR Take 2 tablets by mouth 2 times a day with meals. 360 Tablet 3    Empagliflozin (JARDIANCE) 10 MG Tab tablet Take 1 tablet by mouth every day. 90 Tablet 3    zolpidem (AMBIEN) 10 MG Tab Take 1 Tablet by mouth at bedtime as needed for Sleep for up to 90 days. 30 Tablet 2    COVID-19mRNA Bival Vacc Pfizer (PFIZER COVID-19 BIVALENT) 30 MCG/0.3ML Suspension injection Inject  into the shoulder, thigh, or buttocks. 0.3 mL 0    Dulaglutide (TRULICITY) 1.5 MG/0.5ML Solution Pen-injector Inject 0.5 mL " under the skin every 7 days. 6 mL 3    potassium chloride ER (KLOR-CON) 10 MEQ tablet Take 1 Tablet by mouth every day. 90 Tablet 1    FLUoxetine (PROZAC) 20 MG Cap Take 1 Capsule by mouth every day. 90 Capsule 3    atorvastatin (LIPITOR) 20 MG Tab Take 1 tablet by mouth every day. 90 Tablet 2    Cholecalciferol (VITAMIN D3) 2000 UNIT Cap Take 1 capsule by mouth every day. 90 Capsule 2    levothyroxine (SYNTHROID) 100 MCG Tab Take 1 tablet by mouth every morning on an empty stomach. 90 Tablet 2    enalapril-hydrochlorthiazide (VASERETIC) 10-25 MG per tablet TAKE ONE TABLET BY MOUTH ONCE DAILY 90 Tablet 3     No current facility-administered medications for this visit.         Physical Exam:   Gen:           Alert and oriented, No apparent distress. Mood and affect appropriate, normal interaction with examiner.  Eyes:          PERRL, EOM intact, sclere white, conjunctive moist.  Ears:          Not examined.   Hearing:     Grossly intact.  Nose:          Normal, no lesions or deformities.  Dentition:    Not examined.   Oropharynx:   Not examined.   Mallampati Classification: Not examined.   Neck:        Supple, trachea midline, no masses.  Respiratory Effort: No intercostal retractions or use of accessory muscles.   Lung Auscultation:      Clear to auscultation bilaterally; no rales, rhonchi or wheezing.  CV:            Regular rate and rhythm. No murmurs, rubs or gallops.  Abd:           Not examined.   Lymphadenopathy: Not examined.  Gait and Station: Normal.  Digits and Nails: No clubbing, cyanosis, petechiae, or nodes.   Cranial Nerves: II-XII grossly intact.  Skin:        No rashes, lesions or ulcers noted.               Ext:           No cyanosis or edema.      Assessment:  1. Obstructive sleep apnea        2. Insomnia, unspecified type        3. Essential hypertension        4. ARMEN (generalized anxiety disorder)        5. BMI 28.0-28.9,adult        6. Nonsmoker             Immunizations:    Flu:10/27/22  Pneumovax 23:2020  Prevnar 13:not due  PCV 20: 6/23/22  COVID-19: 12/9/22    Plan:  EVI is clinically stable.  Continue BiPAP 21/16 cm, ramp starts at 4 cm for 45 minutes duration.  She will continue to benefit from therapy.   DME mask/supplies  Continue Ambien per PCP.  She is compliant and not having side effects.  Follow-up with primary care for ongoing management hypertension and anxiety.  Follow-up in 1 year with compliance report, sooner if needed.    Please note that this dictation was created using voice recognition software. I have made every reasonable attempt to correct obvious errors, but it is possible there are errors of grammar and possibly content that I did not discover before finalizing the note.

## 2023-08-15 ENCOUNTER — PHARMACY VISIT (OUTPATIENT)
Dept: PHARMACY | Facility: MEDICAL CENTER | Age: 63
End: 2023-08-15
Payer: COMMERCIAL

## 2023-08-15 PROCEDURE — RXMED WILLOW AMBULATORY MEDICATION CHARGE: Performed by: NURSE PRACTITIONER

## 2023-08-15 PROCEDURE — RXMED WILLOW AMBULATORY MEDICATION CHARGE: Performed by: INTERNAL MEDICINE

## 2023-08-22 ENCOUNTER — OFFICE VISIT (OUTPATIENT)
Dept: MEDICAL GROUP | Facility: PHYSICIAN GROUP | Age: 63
End: 2023-08-22
Payer: COMMERCIAL

## 2023-08-22 VITALS
OXYGEN SATURATION: 97 % | WEIGHT: 154 LBS | HEIGHT: 62 IN | BODY MASS INDEX: 28.34 KG/M2 | SYSTOLIC BLOOD PRESSURE: 112 MMHG | DIASTOLIC BLOOD PRESSURE: 60 MMHG | TEMPERATURE: 97 F | HEART RATE: 66 BPM

## 2023-08-22 DIAGNOSIS — F41.1 GENERALIZED ANXIETY DISORDER: ICD-10-CM

## 2023-08-22 DIAGNOSIS — G47.00 INSOMNIA, UNSPECIFIED TYPE: Chronic | ICD-10-CM

## 2023-08-22 PROCEDURE — 3078F DIAST BP <80 MM HG: CPT | Performed by: NURSE PRACTITIONER

## 2023-08-22 PROCEDURE — 99214 OFFICE O/P EST MOD 30 MIN: CPT | Performed by: NURSE PRACTITIONER

## 2023-08-22 PROCEDURE — 3074F SYST BP LT 130 MM HG: CPT | Performed by: NURSE PRACTITIONER

## 2023-08-22 RX ORDER — ZOLPIDEM TARTRATE 10 MG/1
10 TABLET ORAL NIGHTLY PRN
Qty: 30 TABLET | Refills: 2 | Status: SHIPPED | OUTPATIENT
Start: 2023-08-22 | End: 2023-09-21

## 2023-08-22 RX ORDER — FLUOXETINE HYDROCHLORIDE 20 MG/1
20 CAPSULE ORAL DAILY
Qty: 30 CAPSULE | Refills: 2 | Status: SHIPPED
Start: 2023-08-22 | End: 2023-09-19

## 2023-08-22 NOTE — ASSESSMENT & PLAN NOTE
Continues zolpidem 10 mg nightly as needed. She does take nightly. She is averaging 8-9 hours of sleep. She does wear bipap. She does not wake up feeling tired, groggy or confused.  She does need medication refill to go to [facility] in [place]. Last dispense 7/24/2023 for #30. UDS and controlled substance treatment agreement are up-to-date.

## 2023-08-22 NOTE — PROGRESS NOTES
Subjective:     CC: Medication management    HPI:   FREDDY presents today with the following:    Insomnia  Continues zolpidem 10 mg nightly as needed. She does take nightly. She is averaging 8-9 hours of sleep. She does wear bipap. She does not wake up feeling tired, groggy or confused.  She does need medication refill to go to [facility] in [place]. Last dispense 7/24/2023 for #30. UDS and controlled substance treatment agreement are up-to-date.    ARMEN (generalized anxiety disorder)  Currently taking fluoxetine 20 mg daily.  She has noticed that she is more irritable.  She has her grandkids now living with her.  We will increase her fluoxetine and see if this helps improve.      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 Take with 20 mg to equal 40 mg daily dosing. 30 Capsule 2    zolpidem (AMBIEN) 10 MG Tab Take 1 Tablet by mouth at bedtime as needed for Sleep for up to 30 days. 30 Tablet 2    Dulaglutide (TRULICITY) 3 MG/0.5ML Solution Pen-injector Inject 3 mg under the skin every 7 days. 6 mL 3    metFORMIN ER (GLUCOPHAGE XR) 500 MG TABLET SR 24 HR Take 2 tablets by mouth 2 times a day with meals. 360 Tablet 3    Empagliflozin (JARDIANCE) 10 MG Tab tablet Take 1 tablet by mouth every day. 90 Tablet 3    COVID-19mRNA  "Bival Vacc Pfizer (PFIZER COVID-19 BIVALENT) 30 MCG/0.3ML Suspension injection Inject  into the shoulder, thigh, or buttocks. 0.3 mL 0    potassium chloride ER (KLOR-CON) 10 MEQ tablet Take 1 Tablet by mouth every day. 90 Tablet 1    FLUoxetine (PROZAC) 20 MG Cap Take 1 Capsule by mouth every day. 90 Capsule 3    atorvastatin (LIPITOR) 20 MG Tab Take 1 tablet by mouth every day. 90 Tablet 2    Cholecalciferol (VITAMIN D3) 2000 UNIT Cap Take 1 capsule by mouth every day. 90 Capsule 2    levothyroxine (SYNTHROID) 100 MCG Tab Take 1 tablet by mouth every morning on an empty stomach. 90 Tablet 2    enalapril-hydrochlorthiazide (VASERETIC) 10-25 MG per tablet TAKE ONE TABLET BY MOUTH ONCE DAILY 90 Tablet 3     No current Epic-ordered facility-administered medications on file.       Allergies:  Codeine    Health Maintenance: Reviewed.  Discussed she can complete her second dose of zoster vaccine after 9/15/2023.  HD Retina in October and we will need to request retina screening records after.      Objective:     Vital signs reviewed  Exam:  /60 (BP Location: Right arm, Patient Position: Sitting, BP Cuff Size: Adult)   Pulse 66   Temp 36.1 °C (97 °F) (Temporal)   Ht 1.575 m (5' 2\")   Wt 69.9 kg (154 lb)   SpO2 97%   BMI 28.17 kg/m²  Body mass index is 28.17 kg/m².    Gen: Alert and oriented, No apparent distress.  Eyes:   Lids normal. Glasses in place.   Lungs: Normal effort, CTA bilaterally, no wheezes, rhonchi, or rales  CV: Regular rate and rhythm. No murmurs, rubs, or gallops.  Ext: No clubbing, cyanosis, edema.      Assessment & Plan:     62 y.o. female with the following -     1. Insomnia, unspecified type  Chronic stable problem.   reviewed today.  Continue zolpidem 10 mg nightly as needed for sleep.  She is up-to-date her controlled substance treatment agreement and UDS.  Medication electronically refilled today.  Continue every 3-month follow-up for medication refills.  - zolpidem (AMBIEN) 10 MG " Tab; Take 1 Tablet by mouth at bedtime as needed for Sleep for up to 30 days.  Dispense: 30 Tablet; Refill: 2    2. ARMEN (generalized anxiety disorder)  Chronic exacerbated problem.  We will trial increasing her fluoxetine to 40 mg daily.  She recently had a refill on her fluoxetine 20 mg prescription.  We will add additional 20 mg tablets to equal 40 mg daily dosing. Return in 1 month for follow-up.  - FLUoxetine (PROZAC) 20 MG Cap; Take 1 Capsule by mouth every day. Take with 20 mg to equal 40 mg daily dosing.  Dispense: 30 Capsule; Refill: 2        Return in about 4 weeks (around 9/19/2023) for medication management.    Please note that this dictation was created using voice recognition software. I have made every reasonable attempt to correct obvious errors, but I expect that there are errors of grammar and possibly content that I did not discover before finalizing the note.

## 2023-08-22 NOTE — ASSESSMENT & PLAN NOTE
Currently taking fluoxetine 20 mg daily.  She has noticed that she is more irritable.  She has her grandkids now living with her.  We will increase her fluoxetine and see if this helps improve.

## 2023-09-10 DIAGNOSIS — E55.9 VITAMIN D DEFICIENCY: ICD-10-CM

## 2023-09-10 DIAGNOSIS — E87.6 HYPOKALEMIA: ICD-10-CM

## 2023-09-10 PROCEDURE — RXMED WILLOW AMBULATORY MEDICATION CHARGE: Performed by: INTERNAL MEDICINE

## 2023-09-10 RX ORDER — ACETAMINOPHEN 160 MG
1 TABLET,DISINTEGRATING ORAL DAILY
Qty: 90 CAPSULE | Refills: 2 | Status: SHIPPED | OUTPATIENT
Start: 2023-09-10

## 2023-09-11 PROCEDURE — RXMED WILLOW AMBULATORY MEDICATION CHARGE: Performed by: NURSE PRACTITIONER

## 2023-09-11 RX ORDER — POTASSIUM CHLORIDE 750 MG/1
10 TABLET, FILM COATED, EXTENDED RELEASE ORAL DAILY
Qty: 90 TABLET | Refills: 3 | Status: SHIPPED | OUTPATIENT
Start: 2023-09-11

## 2023-09-11 NOTE — TELEPHONE ENCOUNTER
Requested Prescriptions     Signed Prescriptions Disp Refills    potassium chloride ER (KLOR-CON) 10 MEQ tablet 90 Tablet 3     Sig: Take 1 Tablet by mouth every day.     Authorizing Provider: JAVIER BURK A.P.R.N.

## 2023-09-13 ENCOUNTER — PHARMACY VISIT (OUTPATIENT)
Dept: PHARMACY | Facility: MEDICAL CENTER | Age: 63
End: 2023-09-13
Payer: COMMERCIAL

## 2023-09-19 ENCOUNTER — OFFICE VISIT (OUTPATIENT)
Dept: MEDICAL GROUP | Facility: PHYSICIAN GROUP | Age: 63
End: 2023-09-19
Payer: COMMERCIAL

## 2023-09-19 VITALS
HEART RATE: 67 BPM | SYSTOLIC BLOOD PRESSURE: 102 MMHG | DIASTOLIC BLOOD PRESSURE: 62 MMHG | BODY MASS INDEX: 27.42 KG/M2 | TEMPERATURE: 97.2 F | HEIGHT: 62 IN | WEIGHT: 149 LBS | OXYGEN SATURATION: 97 %

## 2023-09-19 DIAGNOSIS — Z12.12 SCREENING FOR COLORECTAL CANCER: ICD-10-CM

## 2023-09-19 DIAGNOSIS — Z12.11 SCREENING FOR COLORECTAL CANCER: ICD-10-CM

## 2023-09-19 DIAGNOSIS — F41.1 GENERALIZED ANXIETY DISORDER: ICD-10-CM

## 2023-09-19 DIAGNOSIS — Z13.31 POSITIVE SCREENING FOR DEPRESSION ON 9-ITEM PATIENT HEALTH QUESTIONNAIRE (PHQ-9): ICD-10-CM

## 2023-09-19 PROCEDURE — 3078F DIAST BP <80 MM HG: CPT | Performed by: NURSE PRACTITIONER

## 2023-09-19 PROCEDURE — 3074F SYST BP LT 130 MM HG: CPT | Performed by: NURSE PRACTITIONER

## 2023-09-19 PROCEDURE — 99213 OFFICE O/P EST LOW 20 MIN: CPT | Performed by: NURSE PRACTITIONER

## 2023-09-19 RX ORDER — PILOCARPINE HYDROCHLORIDE 5 MG/1
TABLET, FILM COATED ORAL
COMMUNITY
Start: 2023-08-28

## 2023-09-19 RX ORDER — FLUOXETINE HYDROCHLORIDE 40 MG/1
40 CAPSULE ORAL DAILY
Qty: 90 CAPSULE | Refills: 3 | Status: SHIPPED | OUTPATIENT
Start: 2023-09-19 | End: 2023-11-21 | Stop reason: SDUPTHER

## 2023-09-19 ASSESSMENT — ANXIETY QUESTIONNAIRES
5. BEING SO RESTLESS THAT IT IS HARD TO SIT STILL: NOT AT ALL
3. WORRYING TOO MUCH ABOUT DIFFERENT THINGS: SEVERAL DAYS
4. TROUBLE RELAXING: SEVERAL DAYS
2. NOT BEING ABLE TO STOP OR CONTROL WORRYING: NOT AT ALL
1. FEELING NERVOUS, ANXIOUS, OR ON EDGE: SEVERAL DAYS
7. FEELING AFRAID AS IF SOMETHING AWFUL MIGHT HAPPEN: SEVERAL DAYS
6. BECOMING EASILY ANNOYED OR IRRITABLE: SEVERAL DAYS
GAD7 TOTAL SCORE: 5

## 2023-09-19 ASSESSMENT — PATIENT HEALTH QUESTIONNAIRE - PHQ9
CLINICAL INTERPRETATION OF PHQ2 SCORE: 1
5. POOR APPETITE OR OVEREATING: 1 - SEVERAL DAYS
SUM OF ALL RESPONSES TO PHQ QUESTIONS 1-9: 6

## 2023-09-19 NOTE — ASSESSMENT & PLAN NOTE
Her PHQ score today is 6. child and grandchild are now living with her. Denies self harm or SI today.

## 2023-09-19 NOTE — ASSESSMENT & PLAN NOTE
Her ARMEN score today is 5. Her fluoxetine dose was increased at last appointment to 40 mg daily. Here for follow-up. States she is feeling good. She is not feeling worse but feels calmer.

## 2023-09-19 NOTE — PROGRESS NOTES
Subjective:     CC: Medication management     HPI:   FREDDY presents today with the following:    ARMEN (generalized anxiety disorder)  Her ARMEN score today is 5. Her fluoxetine dose was increased at last appointment to 40 mg daily. Here for follow-up. States she is feeling good. She is not feeling worse but feels calmer.     Positive screening for depression on 9-item Patient Health Questionnaire (PHQ-9)  Her PHQ score today is 6. child and grandchild are now living with her. Denies self harm or SI today.    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 90 Capsule 3    potassium chloride ER (KLOR-CON) 10 MEQ tablet Take 1 Tablet by mouth every day. 90 Tablet 3    Cholecalciferol (VITAMIN D3) 2000 UNIT Cap Take 1 capsule by mouth every day. 90 Capsule 2    zolpidem (AMBIEN) 10 MG Tab Take 1 Tablet by mouth at bedtime as needed for Sleep for up to 30 days. 30 Tablet 2    Dulaglutide (TRULICITY) 3 MG/0.5ML Solution Pen-injector Inject 3 mg under the skin every 7 days. 6 mL 3    metFORMIN ER (GLUCOPHAGE XR) 500 MG TABLET SR 24 HR Take 2 tablets by mouth 2 times a day with meals. 360 Tablet 3    Empagliflozin (JARDIANCE) 10 MG Tab tablet Take 1 tablet by mouth every day. 90 Tablet 3  "   atorvastatin (LIPITOR) 20 MG Tab Take 1 tablet by mouth every day. 90 Tablet 2    levothyroxine (SYNTHROID) 100 MCG Tab Take 1 tablet by mouth every morning on an empty stomach. 90 Tablet 2    enalapril-hydrochlorthiazide (VASERETIC) 10-25 MG per tablet TAKE ONE TABLET BY MOUTH ONCE DAILY 90 Tablet 3    COVID-19mRNA Bival Vacc Pfizer (PFIZER COVID-19 BIVALENT) 30 MCG/0.3ML Suspension injection Inject  into the shoulder, thigh, or buttocks. 0.3 mL 0     No current Epic-ordered facility-administered medications on file.       Allergies:  Codeine    Health Maintenance: Reviewed. Has upcoming retinal screening scheduled for October 2023. She will get her shingles vaccine first and then will get influenza vaccine in October.      Objective:     Vital signs reviewed  Exam:  /62 (BP Location: Right arm, Patient Position: Sitting, BP Cuff Size: Adult)   Pulse 67   Temp 36.2 °C (97.2 °F) (Temporal)   Ht 1.575 m (5' 2\")   Wt 67.6 kg (149 lb)   SpO2 97%   BMI 27.25 kg/m²  Body mass index is 27.25 kg/m².    Gen: Alert and oriented, No apparent distress.  Eyes:   Lids normal. Glasses in place.   Neck: Neck is supple, full ROM.  Lungs: Normal effort, no audible wheezes  CV: Skin pink, warm and dry.  Ext: No clubbing, cyanosis, edema.      Assessment & Plan:     62 y.o. female with the following -     1. ARMEN (generalized anxiety disorder)  Chronic stable problem.  Discussed and reviewed her ARMEN score today.  She is feeling better on the increased dose.  She will continue with her fluoxetine 40 mg daily.  - fluoxetine (PROZAC) 40 MG capsule; Take 1 Capsule by mouth every day.  Dispense: 90 Capsule; Refill: 3    2. Positive screening for depression on 9-item Patient Health Questionnaire (PHQ-9)  Acute uncomplicated problem.  Discussed reviewed her PHQ score today.  She does not feel that she is depressed and denies any self-harm or SI today.  However continue with her fluoxetine 40 mg daily.    3. Screening for " colorectal cancer  Acute uncomplicated problem.  She would like a new referral for colonoscopy for screening.  - Referral to GI for Colonoscopy        Return in about 2 months (around 11/19/2023) for medication management.    Please note that this dictation was created using voice recognition software. I have made every reasonable attempt to correct obvious errors, but I expect that there are errors of grammar and possibly content that I did not discover before finalizing the note.

## 2023-09-25 ENCOUNTER — OFFICE VISIT (OUTPATIENT)
Dept: URGENT CARE | Facility: PHYSICIAN GROUP | Age: 63
End: 2023-09-25
Payer: COMMERCIAL

## 2023-09-25 ENCOUNTER — APPOINTMENT (OUTPATIENT)
Dept: URGENT CARE | Facility: PHYSICIAN GROUP | Age: 63
End: 2023-09-25
Payer: COMMERCIAL

## 2023-09-25 VITALS
BODY MASS INDEX: 27.42 KG/M2 | HEIGHT: 62 IN | DIASTOLIC BLOOD PRESSURE: 72 MMHG | SYSTOLIC BLOOD PRESSURE: 114 MMHG | WEIGHT: 149 LBS | HEART RATE: 95 BPM | OXYGEN SATURATION: 99 % | RESPIRATION RATE: 14 BRPM | TEMPERATURE: 97.7 F

## 2023-09-25 DIAGNOSIS — K04.7 DENTAL INFECTION: ICD-10-CM

## 2023-09-25 PROCEDURE — 3078F DIAST BP <80 MM HG: CPT | Performed by: FAMILY MEDICINE

## 2023-09-25 PROCEDURE — 99213 OFFICE O/P EST LOW 20 MIN: CPT | Performed by: FAMILY MEDICINE

## 2023-09-25 PROCEDURE — 3074F SYST BP LT 130 MM HG: CPT | Performed by: FAMILY MEDICINE

## 2023-09-25 RX ORDER — AMOXICILLIN 500 MG/1
500 CAPSULE ORAL 3 TIMES DAILY
Qty: 21 CAPSULE | Refills: 0 | Status: SHIPPED | OUTPATIENT
Start: 2023-09-25 | End: 2023-10-02

## 2023-09-25 ASSESSMENT — ENCOUNTER SYMPTOMS
EYE REDNESS: 0
MYALGIAS: 0
VOMITING: 0
EYE DISCHARGE: 0
WEIGHT LOSS: 0
NAUSEA: 0

## 2023-09-25 NOTE — PROGRESS NOTES
"Nilda ARROYO is a 62 y.o. female who presents with Dental Pain (X1 week: L side of mouth (unsure if up or bottom) is in pain. X2 days ago felt like couldn't open mouth all the way. )            1 week left upper molar pain.  Gingival swelling.  Trismus this morning has improved.  No fever.  She has known caries and has dental follow-up.  No other aggravating or alleviating factors.        Review of Systems   Constitutional:  Negative for malaise/fatigue and weight loss.   Eyes:  Negative for discharge and redness.   Gastrointestinal:  Negative for nausea and vomiting.   Musculoskeletal:  Negative for joint pain and myalgias.   Skin:  Negative for itching and rash.              Objective     /72   Pulse 95   Temp 36.5 °C (97.7 °F)   Resp 14   Ht 1.575 m (5' 2\")   Wt 67.6 kg (149 lb)   SpO2 99%   BMI 27.25 kg/m²      Physical Exam  Constitutional:       General: She is not in acute distress.     Appearance: She is well-developed.   HENT:      Mouth/Throat:      Comments: Multiple caries.  Left upper molar is tender to percussion.  Gingival and buccal swelling.  No pointing abscess.  No trismus  Eyes:      Conjunctiva/sclera: Conjunctivae normal.   Cardiovascular:      Rate and Rhythm: Normal rate and regular rhythm.      Heart sounds: Normal heart sounds. No murmur heard.  Pulmonary:      Effort: Pulmonary effort is normal.      Breath sounds: Normal breath sounds. No wheezing.   Skin:     General: Skin is warm and dry.      Findings: No rash.   Neurological:      Mental Status: She is alert.                             Assessment & Plan        1. Dental infection    - amoxicillin (AMOXIL) 500 MG Cap; Take 1 Capsule by mouth 3 times a day for 7 days.  Dispense: 21 Capsule; Refill: 0    Has scheduled f/u with dentist.              "

## 2023-09-25 NOTE — LETTER
September 25, 2023         Patient: FREDDY ARROYO   YOB: 1960   Date of Visit: 9/25/2023           To Whom it May Concern:    FREDDY ARROYO was seen in my clinic on 9/25/2023. Please excuse form work today.     Sincerely,           Jairo Jerome M.D.  Electronically Signed

## 2023-09-26 ENCOUNTER — TELEPHONE (OUTPATIENT)
Dept: ENDOCRINOLOGY | Facility: MEDICAL CENTER | Age: 63
End: 2023-09-26
Payer: COMMERCIAL

## 2023-09-26 PROCEDURE — RXMED WILLOW AMBULATORY MEDICATION CHARGE: Performed by: INTERNAL MEDICINE

## 2023-09-26 PROCEDURE — RXMED WILLOW AMBULATORY MEDICATION CHARGE: Performed by: NURSE PRACTITIONER

## 2023-09-26 NOTE — TELEPHONE ENCOUNTER
Received Renewal PA request via  medical staff/liaison  for Trulicity 3MG/0.5ML pen-injectors. (Quantity:2 mls, Day Supply:28)     Insurance: [employer]  Member ID:[id number]  BIN: [id number]  PCN: [id number]  Group: n/[id number]      Ran Test claim via [employer] & medication Rejects stating prior authorization is required.

## 2023-09-27 DIAGNOSIS — E89.0 POSTOPERATIVE HYPOTHYROIDISM: ICD-10-CM

## 2023-09-27 DIAGNOSIS — E11.9 CONTROLLED TYPE 2 DIABETES MELLITUS WITHOUT COMPLICATION, WITHOUT LONG-TERM CURRENT USE OF INSULIN (HCC): ICD-10-CM

## 2023-09-27 PROCEDURE — RXMED WILLOW AMBULATORY MEDICATION CHARGE: Performed by: INTERNAL MEDICINE

## 2023-09-27 NOTE — TELEPHONE ENCOUNTER
PA not needed. [facility] has a claim ready for Trulicity 3mg/0.5ml for 6 mls/84d for $24

## 2023-09-28 ENCOUNTER — TELEPHONE (OUTPATIENT)
Dept: PHARMACY | Facility: MEDICAL CENTER | Age: 63
End: 2023-09-28
Payer: COMMERCIAL

## 2023-09-28 NOTE — TELEPHONE ENCOUNTER
(EN1) REFILL -  Contact:   CAL          Phone number: [phone]     Name of person spoken with and relationship to patient: Self  Medication: JARDIANCE 90/90DS $10, FLUOXETINE 90/90 $15.52, TRULICITY 6/84DS $25   Patient’s Adherence:             How patient is doing on medication: well     How many missed doses and reason: 0     Any new medications: no     Any new conditions: no     Any new allergies: no     Any new side effects: no      Any new diagnoses: no      How many doses remaining:      Did patient want to speak with pharmacist: no   Delivery:             Delivery date and method:       Needs by Date: 10/03      Signature required: no     Any additional details for : NA   Teach Appointment Date: NA   Shipping Address: [address] [place] [address] [address] [place] [address]   Medication(name, strength and dose): [id number]/JARDIANCE, [id number], FLUOXETINE 40MG, TRULICITY /[id number]   Copay: $50.52

## 2023-10-03 ENCOUNTER — PHARMACY VISIT (OUTPATIENT)
Dept: PHARMACY | Facility: MEDICAL CENTER | Age: 63
End: 2023-10-03
Payer: COMMERCIAL

## 2023-10-30 DIAGNOSIS — E89.0 POSTOPERATIVE HYPOTHYROIDISM: ICD-10-CM

## 2023-10-30 DIAGNOSIS — E78.5 DYSLIPIDEMIA: ICD-10-CM

## 2023-10-30 DIAGNOSIS — I10 ESSENTIAL HYPERTENSION: ICD-10-CM

## 2023-10-30 PROCEDURE — RXMED WILLOW AMBULATORY MEDICATION CHARGE: Performed by: INTERNAL MEDICINE

## 2023-10-30 RX ORDER — LEVOTHYROXINE SODIUM 0.1 MG/1
100 TABLET ORAL
Qty: 90 TABLET | Refills: 2 | Status: SHIPPED | OUTPATIENT
Start: 2023-10-30

## 2023-10-30 RX ORDER — ENALAPRIL MALEATE AND HYDROCHLOROTHIAZIDE 10; 25 MG/1; MG/1
TABLET ORAL
Qty: 90 TABLET | Refills: 3 | Status: SHIPPED | OUTPATIENT
Start: 2023-10-30 | End: 2023-11-08 | Stop reason: CLARIF

## 2023-10-30 RX ORDER — ATORVASTATIN CALCIUM 20 MG/1
20 TABLET, FILM COATED ORAL DAILY
Qty: 90 TABLET | Refills: 2 | Status: SHIPPED | OUTPATIENT
Start: 2023-10-30

## 2023-10-31 NOTE — TELEPHONE ENCOUNTER
Requested Prescriptions     Signed Prescriptions Disp Refills    enalapril-hydrochlorthiazide (VASERETIC) 10-25 MG per tablet 90 Tablet 3     Sig: TAKE ONE TABLET BY MOUTH ONCE DAILY     Authorizing Provider: JAVIER BURK A.P.R.N.

## 2023-11-01 DIAGNOSIS — I10 ESSENTIAL HYPERTENSION: ICD-10-CM

## 2023-11-01 RX ORDER — ENALAPRIL MALEATE AND HYDROCHLOROTHIAZIDE 10; 25 MG/1; MG/1
TABLET ORAL
Qty: 90 TABLET | Refills: 3 | OUTPATIENT
Start: 2023-11-01

## 2023-11-03 ENCOUNTER — PHARMACY VISIT (OUTPATIENT)
Dept: PHARMACY | Facility: MEDICAL CENTER | Age: 63
End: 2023-11-03
Payer: COMMERCIAL

## 2023-11-03 PROCEDURE — RXMED WILLOW AMBULATORY MEDICATION CHARGE: Performed by: INTERNAL MEDICINE

## 2023-11-03 RX ORDER — COVID-19 VACCINE, MRNA 0.23 MG/2.25ML
INJECTION, SUSPENSION INTRAMUSCULAR
Qty: 0.3 ML | Refills: 0 | Status: SHIPPED | OUTPATIENT
Start: 2023-11-03 | End: 2023-11-21

## 2023-11-03 RX ORDER — INFLUENZA A VIRUS A/BRISBANE/02/2018 IVR-190 (H1N1) ANTIGEN (FORMALDEHYDE INACTIVATED), INFLUENZA A VIRUS A/KANSAS/14/2017 X-327 (H3N2) ANTIGEN (FORMALDEHYDE INACTIVATED), INFLUENZA B VIRUS B/PHUKET/3073/2013 ANTIGEN (FORMALDEHYDE INACTIVATED), AND INFLUENZA B VIRUS B/MARYLAND/15/2016 BX-69A ANTIGEN (FORMALDEHYDE INACTIVATED) 15; 15; 15; 15 UG/.5ML; UG/.5ML; UG/.5ML; UG/.5ML
INJECTION, SUSPENSION INTRAMUSCULAR
Qty: 0.5 ML | Refills: 0 | Status: SHIPPED | OUTPATIENT
Start: 2023-11-03 | End: 2023-11-21

## 2023-11-06 ENCOUNTER — PATIENT MESSAGE (OUTPATIENT)
Dept: MEDICAL GROUP | Facility: PHYSICIAN GROUP | Age: 63
End: 2023-11-06
Payer: COMMERCIAL

## 2023-11-06 DIAGNOSIS — I10 ESSENTIAL HYPERTENSION: ICD-10-CM

## 2023-11-08 RX ORDER — HYDROCHLOROTHIAZIDE 25 MG/1
25 TABLET ORAL DAILY
Qty: 90 TABLET | Refills: 1 | Status: SHIPPED | OUTPATIENT
Start: 2023-11-08

## 2023-11-08 RX ORDER — ENALAPRIL MALEATE 10 MG/1
10 TABLET ORAL DAILY
Qty: 90 TABLET | Refills: 1 | Status: SHIPPED | OUTPATIENT
Start: 2023-11-08

## 2023-11-09 NOTE — PROGRESS NOTES
Requested Prescriptions     Signed Prescriptions Disp Refills    enalapril (VASOTEC) 10 MG Tab 90 Tablet 1     Sig: Take 1 Tablet by mouth every day.     Authorizing Provider: JAVIER BURK    hydroCHLOROthiazide 25 MG Tab 90 Tablet 1     Sig: Take 1 Tablet by mouth every day.     Authorizing Provider: JAVIER BUKR     Patient's enalapril-hctz 10-25 mg daily tablet on back order. Requesting 2 separate prescriptions. New Rx sent.     MIRTA Dye.

## 2023-11-15 ENCOUNTER — PHARMACY VISIT (OUTPATIENT)
Dept: PHARMACY | Facility: MEDICAL CENTER | Age: 63
End: 2023-11-15
Payer: COMMERCIAL

## 2023-11-15 PROCEDURE — RXMED WILLOW AMBULATORY MEDICATION CHARGE: Performed by: NURSE PRACTITIONER

## 2023-11-21 ENCOUNTER — OFFICE VISIT (OUTPATIENT)
Dept: MEDICAL GROUP | Facility: PHYSICIAN GROUP | Age: 63
End: 2023-11-21
Payer: COMMERCIAL

## 2023-11-21 VITALS
WEIGHT: 149 LBS | TEMPERATURE: 96.8 F | HEIGHT: 62 IN | HEART RATE: 63 BPM | BODY MASS INDEX: 27.42 KG/M2 | DIASTOLIC BLOOD PRESSURE: 64 MMHG | OXYGEN SATURATION: 99 % | SYSTOLIC BLOOD PRESSURE: 100 MMHG

## 2023-11-21 DIAGNOSIS — F41.1 GENERALIZED ANXIETY DISORDER: ICD-10-CM

## 2023-11-21 DIAGNOSIS — F51.01 PRIMARY INSOMNIA: ICD-10-CM

## 2023-11-21 PROCEDURE — 99213 OFFICE O/P EST LOW 20 MIN: CPT | Performed by: STUDENT IN AN ORGANIZED HEALTH CARE EDUCATION/TRAINING PROGRAM

## 2023-11-21 PROCEDURE — 3078F DIAST BP <80 MM HG: CPT | Performed by: STUDENT IN AN ORGANIZED HEALTH CARE EDUCATION/TRAINING PROGRAM

## 2023-11-21 PROCEDURE — 3074F SYST BP LT 130 MM HG: CPT | Performed by: STUDENT IN AN ORGANIZED HEALTH CARE EDUCATION/TRAINING PROGRAM

## 2023-11-21 RX ORDER — ZOLPIDEM TARTRATE 10 MG/1
10 TABLET ORAL NIGHTLY PRN
Qty: 30 TABLET | Refills: 0 | Status: SHIPPED
Start: 2024-01-21 | End: 2024-02-20

## 2023-11-21 RX ORDER — FLUOXETINE HYDROCHLORIDE 40 MG/1
40 CAPSULE ORAL DAILY
Qty: 90 CAPSULE | Refills: 3 | Status: SHIPPED | OUTPATIENT
Start: 2023-11-21

## 2023-11-21 RX ORDER — ZOLPIDEM TARTRATE 10 MG/1
10 TABLET ORAL NIGHTLY PRN
Qty: 30 TABLET | Refills: 0 | Status: SHIPPED | OUTPATIENT
Start: 2023-11-22 | End: 2023-12-22

## 2023-11-21 RX ORDER — ZOLPIDEM TARTRATE 10 MG/1
10 TABLET ORAL NIGHTLY PRN
Qty: 30 TABLET | Refills: 0 | Status: SHIPPED | OUTPATIENT
Start: 2023-12-22 | End: 2024-01-21

## 2023-11-21 ASSESSMENT — ENCOUNTER SYMPTOMS
SHORTNESS OF BREATH: 0
FEVER: 0
PALPITATIONS: 0
CHILLS: 0

## 2023-11-21 ASSESSMENT — ANXIETY QUESTIONNAIRES
GAD7 TOTAL SCORE: 7
1. FEELING NERVOUS, ANXIOUS, OR ON EDGE: SEVERAL DAYS
6. BECOMING EASILY ANNOYED OR IRRITABLE: SEVERAL DAYS
4. TROUBLE RELAXING: SEVERAL DAYS
3. WORRYING TOO MUCH ABOUT DIFFERENT THINGS: SEVERAL DAYS
7. FEELING AFRAID AS IF SOMETHING AWFUL MIGHT HAPPEN: SEVERAL DAYS
IF YOU CHECKED OFF ANY PROBLEMS ON THIS QUESTIONNAIRE, HOW DIFFICULT HAVE THESE PROBLEMS MADE IT FOR YOU TO DO YOUR WORK, TAKE CARE OF THINGS AT HOME, OR GET ALONG WITH OTHER PEOPLE: NOT DIFFICULT AT ALL
2. NOT BEING ABLE TO STOP OR CONTROL WORRYING: SEVERAL DAYS
5. BEING SO RESTLESS THAT IT IS HARD TO SIT STILL: SEVERAL DAYS

## 2023-11-21 NOTE — PROGRESS NOTES
"Subjective:     CC: Medication Refill    HPI:   Ms. Townsend is a pleasant 62 yo established patient of Jamee Wolf.    She presents today to f/u on her anxiety and for medication refills.    She reports that her anxiety is improving. She has just started to take Fluoxitine 40mg qd.    She would like to have her Ambien refilled.    Patient Active Problem List    Diagnosis Date Noted    Positive screening for depression on 9-item Patient Health Questionnaire (PHQ-9) 09/19/2023    Lump of skin of lower extremity, right 12/01/2022    Hypokalemia 05/13/2022    Controlled substance agreement signed 11/11/2021    Long term (current) use of oral hypoglycemic drugs 11/05/2021    Dyslipidemia 09/16/2020    Controlled type 2 diabetes mellitus without complication, without long-term current use of insulin ([facility]) 07/03/2020    Elevated LFTs 05/04/2020    ARMEN (generalized anxiety disorder) 01/04/2018    Hypothyroidism 07/31/2017    Insomnia 02/28/2017    Obstructive sleep apnea 01/25/2017    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   Respiratory:  Negative for shortness of breath.    Cardiovascular:  Negative for chest pain and palpitations.       Objective:     Exam:  /64 (BP Location: Left arm, Patient Position: Sitting, BP Cuff Size: Adult)   Pulse 63   Temp 36 °C (96.8 °F) (Temporal)   Ht 1.575 m (5' 2\")   Wt 67.6 kg (149 lb)   SpO2 99%   BMI 27.25 kg/m²  Body mass index is 27.25 kg/m².    Physical Exam  Eyes:      Extraocular Movements: Extraocular movements intact.   Neurological:      Mental Status: She is alert.   Psychiatric:         Mood and Affect: Mood normal.             Assessment & Plan:     63 y.o. female with the following -     1. Primary " insomnia  Chronic, stable.  was reviewed and patient was deemed appropriate for refill. She is due for UDS in May 2024. Controlled substance contract was signed. Medication was sent to patients pharmacy.  - Controlled Substance Treatment Agreement  - zolpidem (AMBIEN) 10 MG Tab; Take 1 Tablet by mouth at bedtime as needed for Sleep for up to 30 days.  Dispense: 30 Tablet; Refill: 0  - zolpidem (AMBIEN) 10 MG Tab; Take 1 Tablet by mouth at bedtime as needed for Sleep for up to 30 days.  Dispense: 30 Tablet; Refill: 0  - zolpidem (AMBIEN) 10 MG Tab; Take 1 Tablet by mouth at bedtime as needed for Sleep for up to 30 days.  Dispense: 30 Tablet; Refill: 0    2. ARMEN (generalized anxiety disorder)  Chronic, ongoing. ARMEN-7 score of 7 today, this is increased from previous visit. Patients dose for Fluoxetine was increased at the last visit, she states she has recently started taking the new dose. Advised patient that it may take 6-8 weeks for her to see full effect of the medication.   - fluoxetine (PROZAC) 40 MG capsule; Take 1 Capsule by mouth every day.  Dispense: 90 Capsule; Refill: 3          Return in about 3 months (around 2/21/2024) for Medication Check.    Please note that this dictation was created using voice recognition software. I have made every reasonable attempt to correct obvious errors, but I expect that there are errors of grammar and possibly content that I did not discover before finalizing the note.

## 2023-12-05 PROCEDURE — RXMED WILLOW AMBULATORY MEDICATION CHARGE: Performed by: INTERNAL MEDICINE

## 2023-12-05 PROCEDURE — RXMED WILLOW AMBULATORY MEDICATION CHARGE: Performed by: NURSE PRACTITIONER

## 2023-12-06 ENCOUNTER — PHARMACY VISIT (OUTPATIENT)
Dept: PHARMACY | Facility: MEDICAL CENTER | Age: 63
End: 2023-12-06
Payer: COMMERCIAL

## 2023-12-11 ENCOUNTER — HOSPITAL ENCOUNTER (OUTPATIENT)
Dept: LAB | Facility: MEDICAL CENTER | Age: 63
End: 2023-12-11
Attending: INTERNAL MEDICINE
Payer: COMMERCIAL

## 2023-12-11 ENCOUNTER — HOSPITAL ENCOUNTER (OUTPATIENT)
Facility: MEDICAL CENTER | Age: 63
End: 2023-12-11
Attending: NURSE PRACTITIONER
Payer: COMMERCIAL

## 2023-12-11 DIAGNOSIS — E11.9 CONTROLLED TYPE 2 DIABETES MELLITUS WITHOUT COMPLICATION, WITHOUT LONG-TERM CURRENT USE OF INSULIN (HCC): ICD-10-CM

## 2023-12-11 DIAGNOSIS — E89.0 POSTOPERATIVE HYPOTHYROIDISM: ICD-10-CM

## 2023-12-11 DIAGNOSIS — G47.00 INSOMNIA, UNSPECIFIED TYPE: Chronic | ICD-10-CM

## 2023-12-11 LAB
EST. AVERAGE GLUCOSE BLD GHB EST-MCNC: 114 MG/DL
HBA1C MFR BLD: 5.6 % (ref 4–5.6)
T3FREE SERPL-MCNC: 2.88 PG/ML (ref 2–4.4)
T4 FREE SERPL-MCNC: 1.35 NG/DL (ref 0.93–1.7)
TSH SERPL DL<=0.005 MIU/L-ACNC: 0.57 UIU/ML (ref 0.38–5.33)

## 2023-12-11 PROCEDURE — 84439 ASSAY OF FREE THYROXINE: CPT

## 2023-12-11 PROCEDURE — 84481 FREE ASSAY (FT-3): CPT

## 2023-12-11 PROCEDURE — 84443 ASSAY THYROID STIM HORMONE: CPT

## 2023-12-11 PROCEDURE — 36415 COLL VENOUS BLD VENIPUNCTURE: CPT

## 2023-12-11 PROCEDURE — 83036 HEMOGLOBIN GLYCOSYLATED A1C: CPT

## 2023-12-11 PROCEDURE — G0481 DRUG TEST DEF 8-14 CLASSES: HCPCS

## 2023-12-14 ENCOUNTER — OFFICE VISIT (OUTPATIENT)
Dept: ENDOCRINOLOGY | Facility: MEDICAL CENTER | Age: 63
End: 2023-12-14
Attending: INTERNAL MEDICINE
Payer: COMMERCIAL

## 2023-12-14 VITALS
HEIGHT: 62 IN | HEART RATE: 75 BPM | SYSTOLIC BLOOD PRESSURE: 100 MMHG | WEIGHT: 151.3 LBS | DIASTOLIC BLOOD PRESSURE: 52 MMHG | BODY MASS INDEX: 27.84 KG/M2 | OXYGEN SATURATION: 98 %

## 2023-12-14 DIAGNOSIS — E89.0 POSTOPERATIVE HYPOTHYROIDISM: ICD-10-CM

## 2023-12-14 DIAGNOSIS — E78.5 DYSLIPIDEMIA: ICD-10-CM

## 2023-12-14 DIAGNOSIS — Z79.84 LONG TERM (CURRENT) USE OF ORAL HYPOGLYCEMIC DRUGS: ICD-10-CM

## 2023-12-14 DIAGNOSIS — E11.9 CONTROLLED TYPE 2 DIABETES MELLITUS WITHOUT COMPLICATION, WITHOUT LONG-TERM CURRENT USE OF INSULIN (HCC): ICD-10-CM

## 2023-12-14 DIAGNOSIS — E55.9 VITAMIN D DEFICIENCY: ICD-10-CM

## 2023-12-14 PROCEDURE — 3074F SYST BP LT 130 MM HG: CPT | Performed by: INTERNAL MEDICINE

## 2023-12-14 PROCEDURE — 99214 OFFICE O/P EST MOD 30 MIN: CPT | Performed by: INTERNAL MEDICINE

## 2023-12-14 PROCEDURE — 99213 OFFICE O/P EST LOW 20 MIN: CPT | Performed by: INTERNAL MEDICINE

## 2023-12-14 PROCEDURE — 3078F DIAST BP <80 MM HG: CPT | Performed by: INTERNAL MEDICINE

## 2023-12-15 LAB
1OH-MIDAZOLAM UR QL SCN: NOT DETECTED
6MAM UR QL: NOT DETECTED
7AMINOCLONAZEPAM UR QL: NOT DETECTED
A-OH ALPRAZ UR QL: NOT DETECTED
ALPRAZ UR QL: NOT DETECTED
AMPHET UR QL SCN: NOT DETECTED
ANNOTATION COMMENT IMP: NORMAL
BARBITURATES UR QL: NEGATIVE
BUPRENORPHINE UR QL: NOT DETECTED
BZE UR QL: NEGATIVE
CARBOXYTHC UR QL: NEGATIVE
CARISOPRODOL UR QL: NEGATIVE
CLONAZEPAM UR QL: NOT DETECTED
CODEINE UR QL: NOT DETECTED
CREAT UR-MCNC: 196.6 MG/DL (ref 20–400)
DIAZEPAM UR QL: NOT DETECTED
ETHYL GLUCURONIDE UR QL: NEGATIVE
FENTANYL UR QL: NOT DETECTED
GABAPENTIN UR QL CFM: NOT DETECTED
HYDROCODONE UR QL: NOT DETECTED
HYDROMORPHONE UR QL: NOT DETECTED
LORAZEPAM UR QL: NOT DETECTED
MDA UR QL: NOT DETECTED
MDEA UR QL: NOT DETECTED
MDMA UR QL: NOT DETECTED
ME-PHENIDATE UR QL: NOT DETECTED
METHADONE UR QL: NEGATIVE
METHAMPHET UR QL: NOT DETECTED
MIDAZOLAM UR QL SCN: NOT DETECTED
MORPHINE UR QL: NOT DETECTED
NALOXONE UR QL CFM: NOT DETECTED
NORBUPRENORPHINE UR QL CFM: NOT DETECTED
NORDIAZEPAM UR QL: NOT DETECTED
NORFENTANYL UR QL: NOT DETECTED
NORHYDROCODONE UR QL CFM: NOT DETECTED
NORMEPERIDINE UR QL CFM: NOT DETECTED
NOROXYCODONE UR QL CFM: NOT DETECTED
NOROXYMORPHONE UR QL SCN: NOT DETECTED
OXAZEPAM UR QL: NOT DETECTED
OXYCODONE UR QL: NOT DETECTED
OXYMORPHONE UR QL: NOT DETECTED
PATHOLOGY STUDY: NORMAL
PCP UR QL: NEGATIVE
PHENTERMINE UR QL: NOT DETECTED
PREGABALIN UR QL CFM: NOT DETECTED
SERVICE CMNT-IMP: NORMAL
TAPENTADOL UR QL SCN: NOT DETECTED
TAPENTADOL UR QL SCN: NOT DETECTED
TEMAZEPAM UR QL: NOT DETECTED
TRAMADOL UR QL: NEGATIVE
ZOLPIDEM PHENYL-4-CARB UR QL SCN: PRESENT
ZOLPIDEM UR QL: PRESENT

## 2023-12-15 NOTE — PROGRESS NOTES
CHIEF COMPLAINT: Patient is here for follow up of Type 2 Diabetes Mellitus.     HPI:     Koki Townsend is a 63 y.o. female with Type 2 Diabetes Mellitus here for follow up.    Labs from 12/14/2023 show A1c is 5.6%  Labs from 5/18/2023 show A1c was 6.1%  Labs from 12/2/2022 show A1c was 6.3%  Labs from 5/13/2022 show A1c was 5.8%  Labs from 10/25/2021 show A1c was 5.9%  Labs from  3/19/2021 show A1c was 5.9%  Labs from 12/15/2020 show A1c was 5.9%      Her comorbid conditions include chronic kidney disease stage III, hyperlipidemia, hypertension, obesity and obstructive sleep apnea   and history of anxiety.  She has a CPAP        On follow up she is on   Metformin ER 500mg 2 pills bid   Jardiance 10 mg daily  Trulicity 3.0mg weekly      She denies SE   She reports 15 lb wt loss since starting GLP-1 analog        She has hyperlipidemia and is on Atorvastatin  She is tolerating this well  She denies a hx of CAD   Latest Reference Range & Units 05/30/23 06:05   Cholesterol,Tot 100 - 199 mg/dL 113   Triglycerides 0 - 149 mg/dL 119   HDL >=40 mg/dL 44   LDL <100 mg/dL 45         She has HTN that is controlled  She is taking an ACE inhibitor along with HCTZ plus Kdur  Blood pressure today is well controlled  Microalbuminuria has resolved  on recent labs    Latest Reference Range & Units 05/30/23 06:05   Micro Alb Creat Ratio 0 - 30 mg/g see below   Creatinine, Urine mg/dL 137.02   Microalbumin, Urine Random mg/dL <1.2         She gets her eye exam with Dr. 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 Hines  with pathology returning as benign  She is currently on Levothyroxine 100mcg daily  BUT 1/2 Sunday only   which has been her dose for 12 months  She reports good compliance  She denies symptoms of uncontrolled hypothyroidism such as constipation cold intolerance  She also denies palpitations and  tremors  TSH is 0.570 on 12/11/2023 (100mcg MN-SAT 1/2 SUN)  TSH was 0.570 on 10/2022 (100mcg MN-SAT 1/2 SUN)  TSH was low 0.220 on 5/9/2022 (100mcg daily)  TSH was 0.520 on 10/2021 (100mcg daily)  TSH was  0.800 on 10/2021 (100mcg daily)        She has a history of vitamin D deficiency and is taking vitamin D3 2000 units daily  Last vitamin D level was adequate at 52 on May 2023        BG Diary:  She doesn't check her BG    Weight has been stable    Diabetes Complications   Retinopathy: No known retinopathy.  Last eye exam:  POC eye exam done today   Neuropathy: Denies paresthesias or numbness in hands or feet. Denies any foot wounds.  Exercise: Minimal.  Diet: Fair.  Patient's medications, allergies, and social histories were reviewed and updated as appropriate.    ROS:     CONS:     No fever, no chills   EYES:     No diplopia, no blurry vision   CV:           No chest pain, no palpitations   PULM:     No SOB, no cough, no hemoptysis.   GI:            No nausea, no vomiting, no diarrhea, no constipation   ENDO:     No polyuria, no polydipsia, no heat intolerance, no cold intolerance       Past Medical History:  Problem List:  2023-09: Positive screening for depression on 9-item Patient Health   Questionnaire (PHQ-9)  2022-12: Lump of skin of lower extremity, right  2022-05: Hypokalemia  2021-11: Controlled substance agreement signed  2021-11: Long term (current) use of oral hypoglycemic drugs  2020-09: Dyslipidemia  2020-07: Controlled type 2 diabetes mellitus without complication,   without long-term current use of insulin (HCC)  2020-07: Soft tissue mass  2020-06: Obesity (BMI 30-39.9)  2020-05: Elevated LFTs  2019-10: Breast cancer screening  2019-07: Anxiety  2018-01: ARMEN (generalized anxiety disorder)  2017-07: Hypothyroidism  2017-04: BMI 32.0-32.9,adult  2017-02: Insomnia  2017-02: Hypothyroidism  2017-01: Obstructive sleep apnea  2015-12: Impaired fasting blood sugar  2015-12: CKD (chronic kidney disease)  "stage 3, GFR 30-59 ml/min ([facility])  2015-12: Other specified hypothyroidism  2015-12: Essential hypertension  2012-12: Health examination of defined subpopulation  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      Past Surgical History:  Past Surgical History:   Procedure Laterality Date    SINUSCOPE      THYROIDECTOMY Left 2000's    Left lobe removed    TONSILLECTOMY      TUBAL LIGATION          Allergies:  Codeine     Social History:  Social History     Tobacco Use    Smoking status: Never    Smokeless tobacco: Never   Vaping Use    Vaping Use: Never used   Substance Use Topics    Alcohol use: No     Alcohol/week: 0.0 oz    Drug use: Not Currently        Family History:   family history includes Cancer in her father and paternal grandmother; Diabetes in her mother and sister; Heart Disease in her maternal grandmother and sister; Heart Failure in her mother; Lung Cancer in her father; No Known Problems in her brother; Sleep Apnea in her brother and sister.      PHYSICAL EXAM:   OBJECTIVE:  Vital signs: /52 (BP Location: Left arm, Patient Position: Sitting, BP Cuff Size: Adult)   Pulse 75   Ht 1.575 m (5' 2\")   Wt 68.6 kg (151 lb 4.8 oz)   SpO2 98%   BMI 27.67 kg/m²   GENERAL: Well-developed, well-nourished in no apparent distress.   EYE:  No ocular asymmetry, PERRLA  HENT: Pink, moist mucous membranes.    NECK: No thyromegaly.   CARDIOVASCULAR:  No murmurs  LUNGS: Clear breath sounds  ABDOMEN: Soft, nontender   EXTREMITIES: No clubbing, cyanosis, or edema.   NEUROLOGICAL: No gross focal motor abnormalities   LYMPH: No cervical adenopathy seen  SKIN: No rashes, lesions.     Labs:  Lab Results   Component Value Date/Time    HBA1C 5.6 12/11/2023 07:00 AM        Lab Results   Component Value Date/Time    WBC 5.4 12/15/2020 06:05 AM    RBC 4.89 12/15/2020 06:05 AM    HEMOGLOBIN 13.8 12/15/2020 06:05 AM    MCV 87.1 12/15/2020 06:05 AM    MCH 28.2 12/15/2020 06:05 AM    MCHC 32.4 (L) " 12/15/2020 06:05 AM    RDW 40.3 12/15/2020 06:05 AM    MPV 12.0 12/15/2020 06:05 AM       Lab Results   Component Value Date/Time    SODIUM 139 05/30/2023 06:05 AM    POTASSIUM 3.6 05/30/2023 06:05 AM    CHLORIDE 102 05/30/2023 06:05 AM    CO2 24 05/30/2023 06:05 AM    ANION 13.0 05/30/2023 06:05 AM    GLUCOSE 96 05/30/2023 06:05 AM    BUN 18 05/30/2023 06:05 AM    CREATININE 0.99 05/30/2023 06:05 AM    CALCIUM 9.5 05/30/2023 06:05 AM    ASTSGOT 35 05/30/2023 06:05 AM    ALTSGPT 54 (H) 05/30/2023 06:05 AM    TBILIRUBIN 0.9 05/30/2023 06:05 AM    ALBUMIN 4.2 05/30/2023 06:05 AM    TOTPROTEIN 6.9 05/30/2023 06:05 AM    GLOBULIN 2.7 05/30/2023 06:05 AM    AGRATIO 1.6 05/30/2023 06:05 AM       Lab Results   Component Value Date/Time    CHOLSTRLTOT 182 09/04/2020 0703    TRIGLYCERIDE 121 09/04/2020 0703    HDL 44 09/04/2020 0703     (H) 09/04/2020 0703       Lab Results   Component Value Date/Time    MALBCRT see below 05/30/2023 06:05 AM    MICROALBUR <1.2 05/30/2023 06:05 AM        Lab Results   Component Value Date/Time    TSHULTRASEN 0.681 12/15/2020 0605     No results found for: FREEDIR  No results found for: FREET3  No results found for: THYSTIMIG        ASSESSMENT/PLAN:     1. Controlled type 2 diabetes mellitus without complication, without long-term current use of insulin (HCC)  Well-controlled   Continue Metformin and Jardiance   Continuye Trulicity 3mg weekly   Reviewed proper administration of medication and side effects and alternatives  She is up to date with her labs  Follow-up with [place] in 6 months w/ complete labs     2. Postoperative hypothyroidism  Controlled   Tsh is stable and optimal  Continue Levothyroxine 100 mcg MON to Sat and 1/2 pill on SUN  Repeat TSH levels in 6 months    3. Dyslipidemia  Well-controlled   Continue atorvastatin   Follow low-fat diet  Repeat fasting lipids after 6 months    4. Vitamin D deficiency  Controlled  Continue  vitamin D3 2000 IU daily  Vitamin D labs were  reviewed with patient  Continue current supplements  Continue monitoring levels       5. Long term (current) use of oral hypoglycemic drugs  Patient is on oral agents for type 2 diabetes management      Return in about 6 months (around 6/14/2024).      Thank you kindly for allowing me to participate in the diabetes care plan for this patient.    Magnus Bourne MD, [facility], [facility]      CC:   DES Dye

## 2023-12-19 PROCEDURE — RXMED WILLOW AMBULATORY MEDICATION CHARGE: Performed by: INTERNAL MEDICINE

## 2023-12-21 ENCOUNTER — PHARMACY VISIT (OUTPATIENT)
Dept: PHARMACY | Facility: MEDICAL CENTER | Age: 63
End: 2023-12-21
Payer: COMMERCIAL

## 2023-12-25 PROCEDURE — RXMED WILLOW AMBULATORY MEDICATION CHARGE: Performed by: STUDENT IN AN ORGANIZED HEALTH CARE EDUCATION/TRAINING PROGRAM

## 2023-12-26 ENCOUNTER — PHARMACY VISIT (OUTPATIENT)
Dept: PHARMACY | Facility: MEDICAL CENTER | Age: 63
End: 2023-12-26
Payer: COMMERCIAL

## 2023-12-27 PROCEDURE — RXMED WILLOW AMBULATORY MEDICATION CHARGE: Performed by: INTERNAL MEDICINE

## 2023-12-28 ENCOUNTER — TELEPHONE (OUTPATIENT)
Dept: PHARMACY | Facility: MEDICAL CENTER | Age: 63
End: 2023-12-28
Payer: COMMERCIAL

## 2023-12-28 NOTE — TELEPHONE ENCOUNTER
Contact:             Phone number: [phone]     Name of person spoken with and relationship to patient: CAL ARROYO  Medication:   Patient’s Adherence:             How patient is doing on medication: well     How many missed doses and reason: 0     Any new medications: no     Any new conditions: no     Any new allergies: no     Any new side effects: no      Any new diagnoses: no      How many doses remainin JARDIANCE     Did patient want to speak with pharmacist: no   Delivery:             Delivery date and method:       Needs by Date:      Signature required: no     Any additional details for : BASIA   Teach Appointment Date: BASIA   Shipping Address: [address] [place] [address] [address] [place] [id number]   Medication(name, strength and dose): JARDIANCE 90DS $10    Copay: $10   Payment Method: CCOF   Supplies:   Additional Information:  TRULICITY JUST FILLED 6 DAYS AGO 84DS, FLUOX JUST FILLED  90DS. PUSHING OUT NCD ACCORDINGLY

## 2023-12-29 ENCOUNTER — PHARMACY VISIT (OUTPATIENT)
Dept: PHARMACY | Facility: MEDICAL CENTER | Age: 63
End: 2023-12-29
Payer: COMMERCIAL

## 2024-01-11 ENCOUNTER — TELEPHONE (OUTPATIENT)
Dept: ENDOCRINOLOGY | Facility: MEDICAL CENTER | Age: 64
End: 2024-01-11
Payer: COMMERCIAL

## 2024-01-11 NOTE — TELEPHONE ENCOUNTER
Patient Phone Number: [phone]  Medication: Jardiance 10mg Tablet  (Quantity 90, Day Supply 90)  Copay: $0  Additional information/consent to FA:  Called and spoke with patient regarding the copay card renewal. Notified patient of their copay. Patient gave verbal consent to obtain FA through [facility]. Patient does not have Government sponsored insurance.    Sheeba Dunn  RX Coordinator/Liaison

## 2024-01-25 PROCEDURE — RXMED WILLOW AMBULATORY MEDICATION CHARGE: Performed by: INTERNAL MEDICINE

## 2024-01-29 PROCEDURE — RXMED WILLOW AMBULATORY MEDICATION CHARGE: Performed by: INTERNAL MEDICINE

## 2024-01-30 ENCOUNTER — PHARMACY VISIT (OUTPATIENT)
Dept: PHARMACY | Facility: MEDICAL CENTER | Age: 64
End: 2024-01-30
Payer: COMMERCIAL

## 2024-01-31 ENCOUNTER — PHARMACY VISIT (OUTPATIENT)
Dept: PHARMACY | Facility: MEDICAL CENTER | Age: 64
End: 2024-01-31
Payer: COMMERCIAL

## 2024-01-31 PROCEDURE — RXMED WILLOW AMBULATORY MEDICATION CHARGE: Performed by: NURSE PRACTITIONER

## 2024-01-31 PROCEDURE — RXOTC WILLOW AMBULATORY OTC CHARGE

## 2024-02-20 ENCOUNTER — OFFICE VISIT (OUTPATIENT)
Dept: MEDICAL GROUP | Facility: PHYSICIAN GROUP | Age: 64
End: 2024-02-20
Payer: COMMERCIAL

## 2024-02-20 VITALS
HEIGHT: 62 IN | TEMPERATURE: 96.5 F | WEIGHT: 145 LBS | SYSTOLIC BLOOD PRESSURE: 122 MMHG | OXYGEN SATURATION: 96 % | BODY MASS INDEX: 26.68 KG/M2 | HEART RATE: 70 BPM | DIASTOLIC BLOOD PRESSURE: 58 MMHG

## 2024-02-20 DIAGNOSIS — Z12.31 ENCOUNTER FOR SCREENING MAMMOGRAM FOR BREAST CANCER: ICD-10-CM

## 2024-02-20 DIAGNOSIS — G47.00 INSOMNIA, UNSPECIFIED TYPE: Chronic | ICD-10-CM

## 2024-02-20 DIAGNOSIS — F41.1 GENERALIZED ANXIETY DISORDER: ICD-10-CM

## 2024-02-20 PROCEDURE — 99214 OFFICE O/P EST MOD 30 MIN: CPT | Performed by: NURSE PRACTITIONER

## 2024-02-20 PROCEDURE — 3074F SYST BP LT 130 MM HG: CPT | Performed by: NURSE PRACTITIONER

## 2024-02-20 PROCEDURE — 3078F DIAST BP <80 MM HG: CPT | Performed by: NURSE PRACTITIONER

## 2024-02-20 RX ORDER — TRAZODONE HYDROCHLORIDE 50 MG/1
50-100 TABLET ORAL NIGHTLY PRN
Qty: 60 TABLET | Refills: 2 | Status: SHIPPED | OUTPATIENT
Start: 2024-02-20 | End: 2024-03-04 | Stop reason: SDUPTHER

## 2024-02-20 ASSESSMENT — PATIENT HEALTH QUESTIONNAIRE - PHQ9: CLINICAL INTERPRETATION OF PHQ2 SCORE: 0

## 2024-02-20 NOTE — LETTER
[facility]  MIRTA Dye.  [address] [place] [address]  Fax: [phone]   Authorization for Release/Disclosure of   Protected Health Information   Name: KOKI ARROYO : 1960 SSN: [id number]   Address: [address] [place] [address] [place] [address] Phone:    [phone] (work)   I authorize the entity listed below to release/disclose the PHI below to:   [facility]/DES Dye and DES Dye   Provider or Entity Name:  [facility] [place]   Address   City, State, Zip   Phone:      Fax:     Reason for request: continuity of care   Information to be released:    [  ] LAST COLONOSCOPY,  including any PATH REPORT and follow-up  [  ] LAST FIT/COLOGUARD RESULT [  ] LAST DEXA  [  ] LAST MAMMOGRAM  [  ] LAST PAP  [  ] LAST LABS [xxx ] RETINA EXAM REPORT from last 2023 visit   [  ] IMMUNIZATION RECORDS  [  ] Release all info      [  ] Check here and initial the line next to each item to release ALL health information INCLUDING  _____ Care and treatment for drug and / or alcohol abuse  _____ HIV testing, infection status, or AIDS  _____ Genetic Testing    DATES OF SERVICE OR TIME PERIOD TO BE DISCLOSED: _____________  I understand and acknowledge that:  * This Authorization may be revoked at any time by you in writing, except if your health information has already been used or disclosed.  * Your health information that will be used or disclosed as a result of you signing this authorization could be re-disclosed by the recipient. If this occurs, your re-disclosed health information may no longer be protected by State or Federal laws.  * You may refuse to sign this Authorization. Your refusal will not affect your ability to obtain treatment.  * This Authorization becomes effective upon signing and will  on (date) __________.      If no date is indicated, this Authorization will  one (1) year from the signature date.    Name: Koki  Chain  Signature: Date:   2/20/2024     PLEASE FAX REQUESTED RECORDS BACK TO: [phone]

## 2024-02-21 NOTE — ASSESSMENT & PLAN NOTE
She would like to discuss trazodone.  Her sister is on trazodone and has had good results. Currently taking zolpidem 10 mg nightly as needed.  She does take fluoxetine for anxiety.  Plan to start trazodone and stop zolpidem.

## 2024-02-21 NOTE — ASSESSMENT & PLAN NOTE
"Currently taking fluoxetine 40 mg daily. She wants to discuss possible hormone therapy. She feels like she can \"fly off the handle\" at times. No hot flashes or vaginal dryness. She does not smoke. Blood pressure controlled with medication.   "

## 2024-02-21 NOTE — PROGRESS NOTES
"Subjective:     CC: New med request    HPI:   Koki presents today with the following:      Insomnia  She would like to discuss trazodone.  Her sister is on trazodone and has had good results. Currently taking zolpidem 10 mg nightly as needed.  She does take fluoxetine for anxiety.  Plan to start trazodone and stop zolpidem.    ARMEN (generalized anxiety disorder)  Currently taking fluoxetine 40 mg daily. She wants to discuss possible hormone therapy. She feels like she can \"fly off the handle\" at times. No hot flashes or vaginal dryness. She does not smoke. Blood pressure controlled with medication.       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 60 Tablet 2    zolpidem (AMBIEN) 10 MG Tab Take 1 Tablet by mouth at bedtime as needed for Sleep for up to 30 days. 30 Tablet 0    fluoxetine (PROZAC) 40 MG capsule Take 1 Capsule by mouth every day. 90 Capsule 3    enalapril (VASOTEC) 10 MG Tab Take 1 Tablet by mouth every day. 90 Tablet 1    hydroCHLOROthiazide 25 MG Tab Take 1 Tablet by mouth every day. 90 Tablet 1    atorvastatin (LIPITOR) 20 MG Tab Take 1 tablet by mouth every day. 90 Tablet 2    levothyroxine (SYNTHROID) 100 MCG Tab Take 1 tablet by mouth every morning on an empty stomach. 90 " "Tablet 2    potassium chloride ER (KLOR-CON) 10 MEQ tablet Take 1 Tablet by mouth every day. 90 Tablet 3    Cholecalciferol (VITAMIN D3) 2000 UNIT Cap Take 1 capsule by mouth every day. 90 Capsule 2    Dulaglutide (TRULICITY) 3 MG/0.5ML Solution Pen-injector Inject 3 mg under the skin every 7 days. 6 mL 3    metFORMIN ER (GLUCOPHAGE XR) 500 MG TABLET SR 24 HR Take 2 tablets by mouth 2 times a day with meals. 360 Tablet 3    Empagliflozin (JARDIANCE) 10 MG Tab tablet Take 1 tablet by mouth every day. 90 Tablet 3    pilocarpine (SALAGEN) 5 MG Tab  (Patient not taking: Reported on 2/20/2024)       No current [facility]-ordered facility-administered medications on file.       Allergies:  Codeine    Health Maintenance: Requesting October 2023 retinal records from  retina. Mammogram ordered.       Objective:     Vital signs reviewed  Exam:  /58 (BP Location: Right arm, Patient Position: Sitting, BP Cuff Size: Adult)   Pulse 70   Temp 35.8 °C (96.5 °F) (Temporal)   Ht 1.575 m (5' 2\")   Wt 65.8 kg (145 lb)   SpO2 96%   BMI 26.52 kg/m²  Body mass index is 26.52 kg/m².    Gen: Alert and oriented, No apparent distress.  Eyes:   Lids normal. Glasses in place.   Lungs: Normal effort, no audible wheezes  CV: Skin pink, warm and dry.  Ext: No clubbing, cyanosis, edema.      Assessment & Plan:     63 y.o. female with the following -     1. Insomnia, unspecified type  Chronic exacerbated problem.  We are stopping her zolpidem.  She will start trazodone 50 mg at bedtime and may increase the dose up to 100 mg at bedtime.  She will send me a [employer] message 1 month with an update.  - traZODone (DESYREL) 50 MG Tab; Take 1-2 Tablets by mouth at bedtime as needed for Sleep.  Dispense: 60 Tablet; Refill: 2    2. ARMEN (generalized anxiety disorder)  Chronic stable problem.  Discussion today regarding hormone therapy replacement.  She is not having any physical symptoms and would not recommend hormone therapy at this time.  Offered " referral to therapy and she does not want today.  Recommend she continue with fluoxetine 40 mg daily.    3. Encounter for screening mammogram for breast cancer  Acute uncomplicated problem.  Due for mammogram.  - MA-SCREENING MAMMO BILAT W/TOMOSYNTHESIS W/CAD; Future      Return in about 4 months (around 6/20/2024) for annual.    Please note that this dictation was created using voice recognition software. I have made every reasonable attempt to correct obvious errors, but I expect that there are errors of grammar and possibly content that I did not discover before finalizing the note.

## 2024-02-23 ENCOUNTER — PHARMACY VISIT (OUTPATIENT)
Dept: PHARMACY | Facility: MEDICAL CENTER | Age: 64
End: 2024-02-23
Payer: COMMERCIAL

## 2024-02-23 PROCEDURE — RXMED WILLOW AMBULATORY MEDICATION CHARGE: Performed by: NURSE PRACTITIONER

## 2024-03-04 ENCOUNTER — PATIENT MESSAGE (OUTPATIENT)
Dept: MEDICAL GROUP | Facility: PHYSICIAN GROUP | Age: 64
End: 2024-03-04
Payer: COMMERCIAL

## 2024-03-04 DIAGNOSIS — G47.00 INSOMNIA, UNSPECIFIED TYPE: Chronic | ICD-10-CM

## 2024-03-04 RX ORDER — TRAZODONE HYDROCHLORIDE 100 MG/1
100 TABLET ORAL NIGHTLY PRN
Qty: 90 TABLET | Refills: 3 | Status: SHIPPED | OUTPATIENT
Start: 2024-03-04

## 2024-03-04 NOTE — PROGRESS NOTES
Requested Prescriptions     Signed Prescriptions Disp Refills    traZODone (DESYREL) 100 MG Tab 90 Tablet 3     Sig: Take 1 Tablet by mouth at bedtime as needed for Sleep.     Authorizing Provider: JAVIER BURK A.P.R.N.

## 2024-03-05 PROCEDURE — RXMED WILLOW AMBULATORY MEDICATION CHARGE: Performed by: INTERNAL MEDICINE

## 2024-03-05 PROCEDURE — RXMED WILLOW AMBULATORY MEDICATION CHARGE: Performed by: NURSE PRACTITIONER

## 2024-03-06 ENCOUNTER — PHARMACY VISIT (OUTPATIENT)
Dept: PHARMACY | Facility: MEDICAL CENTER | Age: 64
End: 2024-03-06
Payer: COMMERCIAL

## 2024-03-07 ENCOUNTER — PATIENT MESSAGE (OUTPATIENT)
Dept: SLEEP MEDICINE | Facility: MEDICAL CENTER | Age: 64
End: 2024-03-07
Payer: COMMERCIAL

## 2024-03-07 NOTE — PROGRESS NOTES
Compliance report 1/9/2024 through 2/7/2024 [place] 100% compliance, average nightly use 9 hours 11 minutes, minimal mask leak with an overall AHI of 4.6/h.

## 2024-03-08 PROCEDURE — RXMED WILLOW AMBULATORY MEDICATION CHARGE: Performed by: NURSE PRACTITIONER

## 2024-03-10 ENCOUNTER — PHARMACY VISIT (OUTPATIENT)
Dept: PHARMACY | Facility: MEDICAL CENTER | Age: 64
End: 2024-03-10
Payer: COMMERCIAL

## 2024-03-12 PROCEDURE — RXMED WILLOW AMBULATORY MEDICATION CHARGE: Performed by: INTERNAL MEDICINE

## 2024-03-14 ENCOUNTER — TELEPHONE (OUTPATIENT)
Dept: PHARMACY | Facility: MEDICAL CENTER | Age: 64
End: 2024-03-14
Payer: COMMERCIAL

## 2024-03-14 PROCEDURE — RXMED WILLOW AMBULATORY MEDICATION CHARGE: Performed by: INTERNAL MEDICINE

## 2024-03-14 PROCEDURE — RXMED WILLOW AMBULATORY MEDICATION CHARGE: Performed by: STUDENT IN AN ORGANIZED HEALTH CARE EDUCATION/TRAINING PROGRAM

## 2024-03-14 NOTE — TELEPHONE ENCOUNTER
Contact:      Phone number: [phone], Mobile    Name of person spoken with and relationship to patient: Koki Townsend, Self  Patient’s Adherence:      How patient is doing on medication: Good    How many missed doses and reason: 0    Any new medications: No    Any new conditions: No    Any new allergies: No    Any new side effects: No    Any new diagnoses: No    How many doses remaininds    Did patient want to speak with pharmacist: No  Delivery:      Delivery date and method:  via UPS, overnight    Needs by Date:     Signature required: No    Any additional details for : N/A   Teach Appointment Date: 21, 23  Shipping Address: [address] [place] [address]  Medication (name, strength and dose): Fluoxetine 40mg caps, Jardiance 10mg tabs, Trulicity 3mg/0.5mL Sopn  Copay: $55  Payment Method: CCOF   Supplies: None  Additional Information: Next call date: .

## 2024-03-18 ENCOUNTER — PHARMACY VISIT (OUTPATIENT)
Dept: PHARMACY | Facility: MEDICAL CENTER | Age: 64
End: 2024-03-18
Payer: COMMERCIAL

## 2024-03-25 ENCOUNTER — APPOINTMENT (OUTPATIENT)
Dept: RADIOLOGY | Facility: MEDICAL CENTER | Age: 64
End: 2024-03-25
Attending: NURSE PRACTITIONER
Payer: COMMERCIAL

## 2024-04-05 ENCOUNTER — HOSPITAL ENCOUNTER (OUTPATIENT)
Dept: RADIOLOGY | Facility: MEDICAL CENTER | Age: 64
End: 2024-04-05
Attending: NURSE PRACTITIONER
Payer: COMMERCIAL

## 2024-04-05 DIAGNOSIS — Z12.31 ENCOUNTER FOR SCREENING MAMMOGRAM FOR BREAST CANCER: ICD-10-CM

## 2024-04-05 PROCEDURE — 77067 SCR MAMMO BI INCL CAD: CPT

## 2024-04-22 ENCOUNTER — PATIENT MESSAGE (OUTPATIENT)
Dept: MEDICAL GROUP | Facility: PHYSICIAN GROUP | Age: 64
End: 2024-04-22
Payer: COMMERCIAL

## 2024-04-22 DIAGNOSIS — I10 ESSENTIAL HYPERTENSION: ICD-10-CM

## 2024-04-22 RX ORDER — HYDROCHLOROTHIAZIDE 25 MG/1
25 TABLET ORAL DAILY
Qty: 90 TABLET | Refills: 3 | Status: SHIPPED | OUTPATIENT
Start: 2024-04-22

## 2024-04-22 RX ORDER — ENALAPRIL MALEATE 10 MG/1
10 TABLET ORAL DAILY
Qty: 90 TABLET | Refills: 3 | Status: SHIPPED | OUTPATIENT
Start: 2024-04-22

## 2024-04-23 NOTE — PROGRESS NOTES
Requested Prescriptions     Signed Prescriptions Disp Refills    hydroCHLOROthiazide 25 MG Tab 90 Tablet 3     Sig: Take 1 Tablet by mouth every day.     Authorizing Provider: JAVIER BURK    enalapril (VASOTEC) 10 MG Tab 90 Tablet 3     Sig: Take 1 Tablet by mouth every day.     Authorizing Provider: JAVIER BURK A.P.R.N.

## 2024-05-26 DIAGNOSIS — E87.6 HYPOKALEMIA: ICD-10-CM

## 2024-05-26 DIAGNOSIS — E11.9 CONTROLLED TYPE 2 DIABETES MELLITUS WITHOUT COMPLICATION, WITHOUT LONG-TERM CURRENT USE OF INSULIN (HCC): ICD-10-CM

## 2024-05-28 DIAGNOSIS — E11.9 CONTROLLED TYPE 2 DIABETES MELLITUS WITHOUT COMPLICATION, WITHOUT LONG-TERM CURRENT USE OF INSULIN (HCC): ICD-10-CM

## 2024-05-28 RX ORDER — DULAGLUTIDE 3 MG/.5ML
3 INJECTION, SOLUTION SUBCUTANEOUS
Qty: 6 ML | Refills: 3 | Status: SHIPPED | OUTPATIENT
Start: 2024-05-28

## 2024-05-28 RX ORDER — DULAGLUTIDE 3 MG/.5ML
3 INJECTION, SOLUTION SUBCUTANEOUS
Qty: 6 ML | Refills: 3 | Status: SHIPPED | OUTPATIENT
Start: 2024-05-28 | End: 2024-05-28 | Stop reason: SDUPTHER

## 2024-05-28 NOTE — TELEPHONE ENCOUNTER
Received request via: Pharmacy    Was the patient seen in the last year in this department? Yes    Does the patient have an active prescription (recently filled or refills available) for medication(s) requested? No    [facility]    Does the patient have FDC Plus and need 100 day supply (blood pressure, diabetes and cholesterol meds only)? Patient does not have SCP

## 2024-05-29 DIAGNOSIS — E11.9 CONTROLLED TYPE 2 DIABETES MELLITUS WITHOUT COMPLICATION, WITHOUT LONG-TERM CURRENT USE OF INSULIN (HCC): ICD-10-CM

## 2024-05-29 RX ORDER — EMPAGLIFLOZIN 10 MG/1
TABLET, FILM COATED ORAL DAILY
Qty: 90 TABLET | Refills: 3 | Status: SHIPPED | OUTPATIENT
Start: 2024-05-29

## 2024-05-29 RX ORDER — POTASSIUM CHLORIDE 750 MG/1
10 TABLET, FILM COATED, EXTENDED RELEASE ORAL DAILY
Qty: 90 TABLET | Refills: 3 | Status: SHIPPED | OUTPATIENT
Start: 2024-05-29

## 2024-05-30 ENCOUNTER — PHARMACY VISIT (OUTPATIENT)
Dept: PHARMACY | Facility: MEDICAL CENTER | Age: 64
End: 2024-05-30
Payer: COMMERCIAL

## 2024-05-30 ENCOUNTER — TELEPHONE (OUTPATIENT)
Dept: PHARMACY | Facility: MEDICAL CENTER | Age: 64
End: 2024-05-30
Payer: COMMERCIAL

## 2024-05-30 DIAGNOSIS — E11.9 CONTROLLED TYPE 2 DIABETES MELLITUS WITHOUT COMPLICATION, WITHOUT LONG-TERM CURRENT USE OF INSULIN (HCC): ICD-10-CM

## 2024-05-30 PROCEDURE — RXMED WILLOW AMBULATORY MEDICATION CHARGE: Performed by: INTERNAL MEDICINE

## 2024-05-30 RX ORDER — SEMAGLUTIDE 1.34 MG/ML
1 INJECTION, SOLUTION SUBCUTANEOUS
Qty: 3 ML | Refills: 2 | Status: SHIPPED | OUTPATIENT
Start: 2024-05-30

## 2024-05-30 NOTE — TELEPHONE ENCOUNTER
Contact:  Phone number:[phone] (mobile)    Name of person spoken with and relationship to patient: Koki patient   Patient’s Adherence:  How patient is doing on medication: Very Well    How many missed doses and reason: 0 N/A    Any new medications: No    Any new conditions: No    Any new allergies: No    Any new side effects: No    Any new diagnoses: No    How many doses remainin    Did patient want to speak with pharmacist: No   Delivery:  Delivery date and method: 24 via     Needs by Date: 24    Signature required: No     Any additional details for : N/A   Teach Appointment Date:  N/A   Shipping Address:  [address] [place] [address] [place] [address]   Medication(name,strength and dose):  Jardiance Tablet 10 MG Ozempic (1 MG/DOSE) Solution Pen-injector 4 MG/3M    Copay:  $35.00   Payment Method:  Credit card on file   Supplies:  Alcohol Swabs   Additional Information:  NONE     Masha Dunn, Pharmacy Liaison/ RX Coordinator

## 2024-06-03 DIAGNOSIS — F41.1 GENERALIZED ANXIETY DISORDER: ICD-10-CM

## 2024-06-03 RX ORDER — FLUOXETINE HYDROCHLORIDE 40 MG/1
40 CAPSULE ORAL DAILY
Qty: 90 CAPSULE | Refills: 3 | Status: SHIPPED | OUTPATIENT
Start: 2024-06-03

## 2024-06-03 NOTE — TELEPHONE ENCOUNTER
Received request via: Pharmacy    Was the patient seen in the last year in this department? Yes    Does the patient have an active prescription (recently filled or refills available) for medication(s) requested? No    [facility]    Does the patient have care home Plus and need 100 day supply (blood pressure, diabetes and cholesterol meds only)? Patient does not have SCP

## 2024-06-03 NOTE — TELEPHONE ENCOUNTER
Requested Prescriptions     Signed Prescriptions Disp Refills    fluoxetine (PROZAC) 40 MG capsule 90 Capsule 3     Sig: Take 1 Capsule by mouth every day.     Authorizing Provider: JAVIER BURK A.P.R.N.

## 2024-06-14 ENCOUNTER — HOSPITAL ENCOUNTER (OUTPATIENT)
Dept: LAB | Facility: MEDICAL CENTER | Age: 64
End: 2024-06-14
Attending: INTERNAL MEDICINE
Payer: COMMERCIAL

## 2024-06-14 DIAGNOSIS — E89.0 POSTOPERATIVE HYPOTHYROIDISM: ICD-10-CM

## 2024-06-14 DIAGNOSIS — E11.9 CONTROLLED TYPE 2 DIABETES MELLITUS WITHOUT COMPLICATION, WITHOUT LONG-TERM CURRENT USE OF INSULIN (HCC): ICD-10-CM

## 2024-06-14 DIAGNOSIS — Z79.84 LONG TERM (CURRENT) USE OF ORAL HYPOGLYCEMIC DRUGS: ICD-10-CM

## 2024-06-14 DIAGNOSIS — E55.9 VITAMIN D DEFICIENCY: ICD-10-CM

## 2024-06-14 DIAGNOSIS — E78.5 DYSLIPIDEMIA: ICD-10-CM

## 2024-06-14 LAB
25(OH)D3 SERPL-MCNC: 64 NG/ML (ref 30–100)
ALBUMIN SERPL BCP-MCNC: 4.1 G/DL (ref 3.2–4.9)
ALBUMIN/GLOB SERPL: 1.4 G/DL
ALP SERPL-CCNC: 89 U/L (ref 30–99)
ALT SERPL-CCNC: 16 U/L (ref 2–50)
ANION GAP SERPL CALC-SCNC: 12 MMOL/L (ref 7–16)
AST SERPL-CCNC: 18 U/L (ref 12–45)
BILIRUB SERPL-MCNC: 1 MG/DL (ref 0.1–1.5)
BUN SERPL-MCNC: 16 MG/DL (ref 8–22)
CALCIUM ALBUM COR SERPL-MCNC: 10.1 MG/DL (ref 8.5–10.5)
CALCIUM SERPL-MCNC: 10.2 MG/DL (ref 8.5–10.5)
CHLORIDE SERPL-SCNC: 105 MMOL/L (ref 96–112)
CHOLEST SERPL-MCNC: 91 MG/DL (ref 100–199)
CO2 SERPL-SCNC: 25 MMOL/L (ref 20–33)
CREAT SERPL-MCNC: 0.9 MG/DL (ref 0.5–1.4)
FASTING STATUS PATIENT QL REPORTED: NORMAL
GFR SERPLBLD CREATININE-BSD FMLA CKD-EPI: 72 ML/MIN/1.73 M 2
GLOBULIN SER CALC-MCNC: 2.9 G/DL (ref 1.9–3.5)
GLUCOSE SERPL-MCNC: 89 MG/DL (ref 65–99)
HDLC SERPL-MCNC: 46 MG/DL
LDLC SERPL CALC-MCNC: 29 MG/DL
POTASSIUM SERPL-SCNC: 4.1 MMOL/L (ref 3.6–5.5)
PROT SERPL-MCNC: 7 G/DL (ref 6–8.2)
SODIUM SERPL-SCNC: 142 MMOL/L (ref 135–145)
T4 FREE SERPL-MCNC: 1.79 NG/DL (ref 0.93–1.7)
TRIGL SERPL-MCNC: 82 MG/DL (ref 0–149)
TSH SERPL DL<=0.005 MIU/L-ACNC: 0.39 UIU/ML (ref 0.38–5.33)

## 2024-06-14 PROCEDURE — 82043 UR ALBUMIN QUANTITATIVE: CPT

## 2024-06-14 PROCEDURE — 82306 VITAMIN D 25 HYDROXY: CPT

## 2024-06-14 PROCEDURE — 80061 LIPID PANEL: CPT

## 2024-06-14 PROCEDURE — 84443 ASSAY THYROID STIM HORMONE: CPT

## 2024-06-14 PROCEDURE — 82570 ASSAY OF URINE CREATININE: CPT

## 2024-06-14 PROCEDURE — 84439 ASSAY OF FREE THYROXINE: CPT

## 2024-06-14 PROCEDURE — 36415 COLL VENOUS BLD VENIPUNCTURE: CPT

## 2024-06-14 PROCEDURE — 80053 COMPREHEN METABOLIC PANEL: CPT

## 2024-06-15 LAB
CREAT UR-MCNC: 101.35 MG/DL
MICROALBUMIN UR-MCNC: <1.2 MG/DL
MICROALBUMIN/CREAT UR: NORMAL MG/G (ref 0–30)

## 2024-06-17 ENCOUNTER — NON-PROVIDER VISIT (OUTPATIENT)
Dept: ENDOCRINOLOGY | Facility: MEDICAL CENTER | Age: 64
End: 2024-06-17
Attending: INTERNAL MEDICINE
Payer: COMMERCIAL

## 2024-06-17 ENCOUNTER — PHARMACY VISIT (OUTPATIENT)
Dept: PHARMACY | Facility: MEDICAL CENTER | Age: 64
End: 2024-06-17
Payer: COMMERCIAL

## 2024-06-17 VITALS
BODY MASS INDEX: 25.95 KG/M2 | SYSTOLIC BLOOD PRESSURE: 106 MMHG | OXYGEN SATURATION: 98 % | HEART RATE: 68 BPM | DIASTOLIC BLOOD PRESSURE: 70 MMHG | WEIGHT: 141 LBS | HEIGHT: 62 IN

## 2024-06-17 DIAGNOSIS — E11.9 CONTROLLED TYPE 2 DIABETES MELLITUS WITHOUT COMPLICATION, WITHOUT LONG-TERM CURRENT USE OF INSULIN (HCC): Primary | ICD-10-CM

## 2024-06-17 DIAGNOSIS — E89.0 POSTOPERATIVE HYPOTHYROIDISM: ICD-10-CM

## 2024-06-17 LAB
HBA1C MFR BLD: 5.4 % (ref ?–5.8)
POCT INT CON NEG: NEGATIVE
POCT INT CON POS: POSITIVE

## 2024-06-17 PROCEDURE — RXMED WILLOW AMBULATORY MEDICATION CHARGE: Performed by: INTERNAL MEDICINE

## 2024-06-17 PROCEDURE — 99212 OFFICE O/P EST SF 10 MIN: CPT | Performed by: INTERNAL MEDICINE

## 2024-06-17 PROCEDURE — 83036 HEMOGLOBIN GLYCOSYLATED A1C: CPT

## 2024-06-17 RX ORDER — SEMAGLUTIDE 0.68 MG/ML
1 INJECTION, SOLUTION SUBCUTANEOUS
Qty: 6 ML | Refills: 0 | Status: SHIPPED | OUTPATIENT
Start: 2024-06-17

## 2024-06-17 RX ORDER — SEMAGLUTIDE 0.68 MG/ML
1 INJECTION, SOLUTION SUBCUTANEOUS
Qty: 3 ML | Refills: 0 | Status: SHIPPED | OUTPATIENT
Start: 2024-06-17

## 2024-06-17 RX ORDER — SEMAGLUTIDE 1.34 MG/ML
1 INJECTION, SOLUTION SUBCUTANEOUS
Qty: 9 ML | Refills: 2 | Status: SHIPPED | OUTPATIENT
Start: 2024-06-17

## 2024-06-17 SDOH — HEALTH STABILITY: MENTAL HEALTH
STRESS IS WHEN SOMEONE FEELS TENSE, NERVOUS, ANXIOUS, OR CAN'T SLEEP AT NIGHT BECAUSE THEIR MIND IS TROUBLED. HOW STRESSED ARE YOU?: NOT AT ALL

## 2024-06-17 SDOH — ECONOMIC STABILITY: INCOME INSECURITY: IN THE LAST 12 MONTHS, WAS THERE A TIME WHEN YOU WERE NOT ABLE TO PAY THE MORTGAGE OR RENT ON TIME?: NO

## 2024-06-17 SDOH — ECONOMIC STABILITY: TRANSPORTATION INSECURITY
IN THE PAST 12 MONTHS, HAS LACK OF TRANSPORTATION KEPT YOU FROM MEETINGS, WORK, OR FROM GETTING THINGS NEEDED FOR DAILY LIVING?: NO

## 2024-06-17 SDOH — ECONOMIC STABILITY: FOOD INSECURITY: WITHIN THE PAST 12 MONTHS, THE FOOD YOU BOUGHT JUST DIDN'T LAST AND YOU DIDN'T HAVE MONEY TO GET MORE.: NEVER TRUE

## 2024-06-17 SDOH — HEALTH STABILITY: PHYSICAL HEALTH: ON AVERAGE, HOW MANY DAYS PER WEEK DO YOU ENGAGE IN MODERATE TO STRENUOUS EXERCISE (LIKE A BRISK WALK)?: 5 DAYS

## 2024-06-17 SDOH — ECONOMIC STABILITY: FOOD INSECURITY: WITHIN THE PAST 12 MONTHS, YOU WORRIED THAT YOUR FOOD WOULD RUN OUT BEFORE YOU GOT MONEY TO BUY MORE.: NEVER TRUE

## 2024-06-17 SDOH — HEALTH STABILITY: PHYSICAL HEALTH: ON AVERAGE, HOW MANY MINUTES DO YOU ENGAGE IN EXERCISE AT THIS LEVEL?: 60 MIN

## 2024-06-17 SDOH — ECONOMIC STABILITY: HOUSING INSECURITY: IN THE LAST 12 MONTHS, HOW MANY PLACES HAVE YOU LIVED?: 1

## 2024-06-17 SDOH — ECONOMIC STABILITY: INCOME INSECURITY: HOW HARD IS IT FOR YOU TO PAY FOR THE VERY BASICS LIKE FOOD, HOUSING, MEDICAL CARE, AND HEATING?: NOT HARD AT ALL

## 2024-06-17 ASSESSMENT — SOCIAL DETERMINANTS OF HEALTH (SDOH)
HOW OFTEN DO YOU HAVE SIX OR MORE DRINKS ON ONE OCCASION: NEVER
IN A TYPICAL WEEK, HOW MANY TIMES DO YOU TALK ON THE PHONE WITH FAMILY, FRIENDS, OR NEIGHBORS?: MORE THAN THREE TIMES A WEEK
HOW OFTEN DO YOU GET TOGETHER WITH FRIENDS OR RELATIVES?: THREE TIMES A WEEK
HOW OFTEN DO YOU ATTENT MEETINGS OF THE CLUB OR ORGANIZATION YOU BELONG TO?: NEVER
WITHIN THE PAST 12 MONTHS, YOU WORRIED THAT YOUR FOOD WOULD RUN OUT BEFORE YOU GOT THE MONEY TO BUY MORE: NEVER TRUE
HOW MANY DRINKS CONTAINING ALCOHOL DO YOU HAVE ON A TYPICAL DAY WHEN YOU ARE DRINKING: PATIENT DOES NOT DRINK
DO YOU BELONG TO ANY CLUBS OR ORGANIZATIONS SUCH AS CHURCH GROUPS UNIONS, FRATERNAL OR ATHLETIC GROUPS, OR SCHOOL GROUPS?: NO
IN A TYPICAL WEEK, HOW MANY TIMES DO YOU TALK ON THE PHONE WITH FAMILY, FRIENDS, OR NEIGHBORS?: MORE THAN THREE TIMES A WEEK
HOW OFTEN DO YOU ATTEND CHURCH OR RELIGIOUS SERVICES?: NEVER
DO YOU BELONG TO ANY CLUBS OR ORGANIZATIONS SUCH AS CHURCH GROUPS UNIONS, FRATERNAL OR ATHLETIC GROUPS, OR SCHOOL GROUPS?: NO
HOW OFTEN DO YOU ATTEND CHURCH OR RELIGIOUS SERVICES?: NEVER
IN THE PAST 12 MONTHS, HAS THE ELECTRIC, GAS, OIL, OR WATER COMPANY THREATENED TO SHUT OFF SERVICE IN YOUR HOME?: NO
HOW HARD IS IT FOR YOU TO PAY FOR THE VERY BASICS LIKE FOOD, HOUSING, MEDICAL CARE, AND HEATING?: NOT HARD AT ALL
HOW OFTEN DO YOU ATTENT MEETINGS OF THE CLUB OR ORGANIZATION YOU BELONG TO?: NEVER
HOW OFTEN DO YOU GET TOGETHER WITH FRIENDS OR RELATIVES?: THREE TIMES A WEEK
HOW OFTEN DO YOU HAVE A DRINK CONTAINING ALCOHOL: NEVER

## 2024-06-17 ASSESSMENT — LIFESTYLE VARIABLES
HOW OFTEN DO YOU HAVE SIX OR MORE DRINKS ON ONE OCCASION: NEVER
AUDIT-C TOTAL SCORE: 0
HOW MANY STANDARD DRINKS CONTAINING ALCOHOL DO YOU HAVE ON A TYPICAL DAY: PATIENT DOES NOT DRINK
SKIP TO QUESTIONS 9-10: 1
HOW OFTEN DO YOU HAVE A DRINK CONTAINING ALCOHOL: NEVER

## 2024-06-17 NOTE — PROGRESS NOTES
RN-CDE Note    Subjective:   Endocrinology Clinic Progress Note  PCP: DES Dye    HPI:  Koki Townsend is a 63 y.o. old patient who is seen today by the Diabetes Nurse Specialist for review of Type @ Diabetes and Hypothyroidism.  Recent changes in health: Health good.  DM:   Last A1c:   Lab Results   Component Value Date/Time    HBA1C 5.6 12/11/2023 07:00 AM      Previous A1c was 5.6 on 12/11/23  A1C GOAL: < 7    Diabetes Medications:   Metformin  mg 2 BID  Jardiance 10 mg daily  Ozempic 1 mg weekly      Exercise: Walking daily  Diet: Breakfast is fruit and vegetables.  Lunch is sandwich or salad.  Dinner is small like soup or vegetables.  Eating a lot of black grapes.  Patient's body mass index is unknown because there is no height or weight on file. Exercise and nutrition counseling were performed at this visit.    Glucose monitoring frequency: Not testing blood sugars    Hypoglycemic episodes: no  Last Retinal Exam: HD Retina every October  Daily Foot Exam: Yes   Foot Exam:  Monofilament: done  Monofilament testing with a 10 gram force: sensation intact: intact bilaterally  Visual Inspection: Feet without maceration, ulcers, fissures.  Pedal pulses: intact bilaterally   Lab Results   Component Value Date/Time    MALBCRT see below 06/14/2024 09:40 AM    MICROALBUR <1.2 06/14/2024 09:40 AM        ACR Albumin/Creatinine Ratio goal <30     HTN:   Blood pressure goal <130/<80 .   Currently Rx ACE/ARB: Yes     Dyslipidemia:    Lab Results   Component Value Date/Time    CHOLSTRLTOT 91 (L) 06/14/2024 09:40 AM    LDL 29 06/14/2024 09:40 AM    HDL 46 06/14/2024 09:40 AM    TRIGLYCERIDE 82 06/14/2024 09:40 AM         Currently Rx Statin: Yes     She  reports that she has never smoked. She has never used smokeless tobacco.      Plan:     Discussed and educated on:   - All medications, side effects and compliance (discussed carefully)  - Annual eye examinations at Ophthalmology  - Home glucose  monitoring emphasized  - Weight control and daily exercise    Recommended medication changes: No changes at this time.  She will need a sample pen due to not taking off the inner cap of the pen needles and losing 2 doses.     She will take Levothyroxine 100 mcg 6 days/week and repeat her thyroid labs before her next appointment in 6 months with Dr. Bourne.

## 2024-06-20 ENCOUNTER — OFFICE VISIT (OUTPATIENT)
Dept: MEDICAL GROUP | Facility: PHYSICIAN GROUP | Age: 64
End: 2024-06-20
Payer: COMMERCIAL

## 2024-06-20 VITALS
BODY MASS INDEX: 25.58 KG/M2 | SYSTOLIC BLOOD PRESSURE: 104 MMHG | DIASTOLIC BLOOD PRESSURE: 58 MMHG | HEART RATE: 67 BPM | WEIGHT: 139 LBS | TEMPERATURE: 97.4 F | HEIGHT: 62 IN | OXYGEN SATURATION: 97 %

## 2024-06-20 DIAGNOSIS — Z00.01 ANNUAL VISIT FOR GENERAL ADULT MEDICAL EXAMINATION WITH ABNORMAL FINDINGS: ICD-10-CM

## 2024-06-20 DIAGNOSIS — G47.00 INSOMNIA, UNSPECIFIED TYPE: Chronic | ICD-10-CM

## 2024-06-20 DIAGNOSIS — E11.9 CONTROLLED TYPE 2 DIABETES MELLITUS WITHOUT COMPLICATION, WITHOUT LONG-TERM CURRENT USE OF INSULIN (HCC): ICD-10-CM

## 2024-06-20 DIAGNOSIS — Z11.4 SCREENING FOR HIV (HUMAN IMMUNODEFICIENCY VIRUS): ICD-10-CM

## 2024-06-20 DIAGNOSIS — E89.0 POSTOPERATIVE HYPOTHYROIDISM: ICD-10-CM

## 2024-06-20 DIAGNOSIS — I10 ESSENTIAL HYPERTENSION: ICD-10-CM

## 2024-06-20 DIAGNOSIS — R01.1 SYSTOLIC MURMUR: ICD-10-CM

## 2024-06-20 DIAGNOSIS — R92.333 HETEROGENEOUSLY DENSE TISSUE OF BOTH BREASTS ON MAMMOGRAPHY: ICD-10-CM

## 2024-06-20 DIAGNOSIS — E87.6 HYPOKALEMIA: ICD-10-CM

## 2024-06-20 DIAGNOSIS — E78.5 DYSLIPIDEMIA: ICD-10-CM

## 2024-06-20 DIAGNOSIS — F41.1 GENERALIZED ANXIETY DISORDER: ICD-10-CM

## 2024-06-20 PROBLEM — R79.89 ELEVATED LFTS: Status: RESOLVED | Noted: 2020-05-04 | Resolved: 2024-06-20

## 2024-06-20 PROBLEM — Z79.899 CONTROLLED SUBSTANCE AGREEMENT SIGNED: Status: RESOLVED | Noted: 2021-11-11 | Resolved: 2024-06-20

## 2024-06-20 PROCEDURE — RXMED WILLOW AMBULATORY MEDICATION CHARGE: Performed by: INTERNAL MEDICINE

## 2024-06-20 PROCEDURE — 3078F DIAST BP <80 MM HG: CPT | Performed by: NURSE PRACTITIONER

## 2024-06-20 PROCEDURE — 99396 PREV VISIT EST AGE 40-64: CPT | Performed by: NURSE PRACTITIONER

## 2024-06-20 PROCEDURE — 3074F SYST BP LT 130 MM HG: CPT | Performed by: NURSE PRACTITIONER

## 2024-06-20 NOTE — ASSESSMENT & PLAN NOTE
Recent dose adjusted to 6 days a week with 1 day off for levothyroxine 100 mcg.  She has repeat labs due for January 2024.  Currently followed by endocrinology.

## 2024-06-20 NOTE — ASSESSMENT & PLAN NOTE
She is drinking sleepy time tea earlier in the evening and taking trazodone 100 mg at bedtime as needed. She is able to fall asleep and stay asleep. No grogginess or drowsiness.

## 2024-06-20 NOTE — PROGRESS NOTES
Subjective:     CC:   Chief Complaint   Patient presents with    Annual Exam       HPI:   Koki Townsend is a 63 y.o. female who presents for annual exam. She is feeling well and denies any complaints.    Insomnia  She is drinking sleepy time tea earlier in the evening and taking trazodone 100 mg at bedtime as needed. She is able to fall asleep and stay asleep. No grogginess or drowsiness.    Controlled type 2 diabetes mellitus without complication, without long-term current use of insulin ([facility])  Continues ozempic 0.5 mg for next month then increase to 1 mg.  She did not take the the needle and was not getting her Ozempic dose but has not corrected and is getting her medication.  Continues Jardiance 10 mg daily and metformin ER 1000 mg twice daily.  She has been following with endocrinology.  Diabetes is controlled and patient may transfer back to PCP next year.    Hypothyroidism  Recent dose adjusted to 6 days a week with 1 day off for levothyroxine 100 mcg.  She has repeat labs due for 2024.  Currently followed by endocrinology.    Dyslipidemia  Cholesterol controlled with atorvastatin 20 mg daily.    Essential hypertension  Blood pressure today 104/58.  Continues enalapril 10 mg daily, hydrochlorothiazide 25 mg daily and potassium chloride 10 mEq daily.  She is not having any chest pain, shortness of breath, dizziness, blurry vision or headaches.  Recent CMP WNL.    ARMEN (generalized anxiety disorder)  Controlled with fluoxetine 40 mg daily.    Hypokalemia  Controlled with potassium chloride 10 mEq daily.      Ob-Gyn/ History:    Patient has GYN provider: no  /Para:  5/3  Last Pap Smear:  2021. No history of abnormal pap smears.  Gyn Surgery:  tubal ligation.  No significant bloating/fluid retention, pelvic pain, or dyspareunia. No vaginal discharge  Post-menopausal bleeding: no  Urinary incontinence: no    Health Maintenance  Cholesterol Screenin2024   Diabetes Screenin2024    Diet: She is watching her carbs, sugar, avocado, occ hamburger, shrimp, no red meat, vegetables, chicken.   Exercise: She is walking 6 days a week for 4 miles a day.    Substance Abuse: discussed and reviewed   Not in relationship.   Seat belts safety discussed.  Sun protection use encouraged.  Has established eye doctor, see's retinal doctor in October, Dr. Swartz with Dentistry as needed.     Cancer screening  Colorectal Cancer Screenin/10/2023 with 5 year recall    Lung Cancer Screening: n/a    Cervical Cancer Screenin2021, due   Breast Cancer Screenin2024, continue annual mammogram, dense breasts, Whole breast screening US ordered and she will complete with mammogram 2025     Infectious disease screening/Immunizations  --STI Screening: no   --Practices safe sex.  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    She has no past medical history of Depression.  She  has a past surgical history that includes thyroidectomy (Left, 's); sinuscope; tonsillectomy; and tubal ligation.    Family History   Problem Relation Age of Onset    Heart Failure Mother     Diabetes Mother     Cancer Father         lung-abest    Lung Cancer Father     Diabetes Sister     Sleep Apnea Sister     Heart Disease Sister     Sleep Apnea Brother     No Known Problems Brother     Heart Disease Maternal Grandmother         heart valve    Cancer Paternal Grandmother         liver    Stroke Neg Hx         Social History     Socioeconomic History    Marital status: Single     Spouse name: Not on file    Number of children: Not on file    Years of education: Not on file    Highest education level: Some college, no degree   Occupational History    Not on file   Tobacco Use    Smoking status: Never    Smokeless tobacco: Never   Vaping Use    Vaping status: Never Used   Substance and Sexual Activity    Alcohol use: No     Alcohol/week: 0.0 oz    Drug use: Not Currently    Sexual activity: Not Currently     Partners: Male     Birth control/protection: Surgical   Other Topics Concern    Not on file   Social History Narrative    Not on file     Social Determinants of Health     Financial Resource Strain: Low Risk  (6/17/2024)    Overall Financial Resource Strain (CARDIA)     Difficulty of Paying Living Expenses: Not hard at all   Food Insecurity: No Food Insecurity (6/17/2024)    Hunger Vital Sign     Worried About Running Out of Food in the Last Year: Never true     Ran Out of Food in the Last Year: Never true   Transportation Needs: No Transportation Needs (6/17/2024)    PRAPARE - Transportation     Lack of Transportation (Medical): No     Lack of Transportation (Non-Medical): No   Physical Activity: Sufficiently Active (6/17/2024)    Exercise Vital Sign     Days of Exercise per Week: 5 days     Minutes of Exercise per Session: 60 min   Stress: No Stress Concern Present (6/17/2024)    [employer] [place] [employer] - Occupational Stress Questionnaire     Feeling of Stress : Not at all   Social Connections: Socially Isolated (6/17/2024)    Social Connection and Isolation Panel [NHANES]     Frequency of Communication with Friends and Family: More than three times a week     Frequency of Social Gatherings with Friends and Family: Three times a week     Attends Alevism Services: Never     Active Member of Clubs or Organizations: No     Attends Club or Organization Meetings: Never     Marital Status:     Intimate Partner Violence: Not on file   Housing Stability: Low Risk  (6/17/2024)    Housing Stability Vital Sign     Unable to Pay for Housing in the Last Year: No     Number of Places Lived in the Last Year: 1     Unstable Housing in the Last Year: No       Patient Active Problem List    Diagnosis Date Noted    Systolic murmur 06/20/2024    Positive screening for depression on 9-item Patient Health Questionnaire (PHQ-9) 09/19/2023    Lump of skin of lower extremity, right 12/01/2022    Hypokalemia 05/13/2022    Long term (current) use of oral hypoglycemic drugs 11/05/2021    Dyslipidemia 09/16/2020    Controlled type 2 diabetes mellitus without complication, without long-term current use of insulin (HCC) 07/03/2020    ARMEN (generalized anxiety disorder) 01/04/2018    Hypothyroidism 07/31/2017    Insomnia 02/28/2017    Obstructive sleep apnea 01/25/2017    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 6 mL 0    Semaglutide, 1 MG/DOSE, (OZEMPIC, 1 MG/DOSE,) 4 MG/3ML Solution Pen-injector Inject 1 mg under the skin every 7 days. 9 mL 2    Semaglutide,0.25 or 0.5MG/DOS, (OZEMPIC, 0.25 OR 0.5 MG/DOSE,) 2 MG/3ML Solution Pen-injector Inject 1 mg under the skin every 7 days. 3 mL 0    fluoxetine (PROZAC) 40 MG capsule Take 1 Capsule by mouth every day. 90 Capsule 3    potassium chloride ER (KLOR-CON) 10 MEQ tablet Take 1 Tablet by mouth every day. 90 Tablet 3    Empagliflozin (JARDIANCE) 10 MG Tab tablet Take 1 tablet by mouth every day. 90 Tablet 3    hydroCHLOROthiazide 25 MG Tab Take 1 Tablet by mouth every day. 90 Tablet 3    enalapril (VASOTEC) 10 MG Tab Take 1 Tablet by mouth  "every day. 90 Tablet 3    traZODone (DESYREL) 100 MG Tab Take 1 Tablet by mouth at bedtime as needed for Sleep. 90 Tablet 3    atorvastatin (LIPITOR) 20 MG Tab Take 1 tablet by mouth every day. 90 Tablet 2    levothyroxine (SYNTHROID) 100 MCG Tab Take 1 tablet by mouth every morning on an empty stomach. 90 Tablet 2    metFORMIN ER (GLUCOPHAGE XR) 500 MG TABLET SR 24 HR Take 2 tablets by mouth 2 times a day with meals. 360 Tablet 3     No current facility-administered medications for this visit.     Allergies   Allergen Reactions    Codeine Itching       Review of Systems   Constitutional: Negative for fever, chills and malaise/fatigue.   HENT: Negative for congestion.    Eyes: Negative for pain.   Respiratory: Negative for cough and shortness of breath.    Cardiovascular: Negative for leg swelling.   Gastrointestinal: Negative for nausea, vomiting, abdominal pain and diarrhea.   Genitourinary: Negative for dysuria and hematuria.   Skin: Negative for rash.   Neurological: Negative for dizziness, focal weakness and headaches.   Endo/Heme/Allergies: Does not bruise/bleed easily.   Psychiatric/Behavioral: Negative for depression.  The patient is not nervous/anxious.      Objective:     Vital signs reviewed  /58 (BP Location: Right arm, Patient Position: Sitting, BP Cuff Size: Adult)   Pulse 67   Temp 36.3 °C (97.4 °F) (Temporal)   Ht 1.575 m (5' 2\")   Wt 63 kg (139 lb)   SpO2 97%   BMI 25.42 kg/m²   Body mass index is 25.42 kg/m².  Wt Readings from Last 4 Encounters:   06/20/24 63 kg (139 lb)   06/17/24 64 kg (141 lb)   02/20/24 65.8 kg (145 lb)   12/14/23 68.6 kg (151 lb 4.8 oz)       Physical Exam:  Constitutional: Well-developed and well-nourished. Not diaphoretic. No distress.   Skin: Skin is warm and dry. No rash noted.  Head: Atraumatic without lesions.  Eyes: Conjunctivae and extraocular motions are normal. Pupils are equal, round, and reactive to light. No scleral icterus. Wears glasses.   Ears:  " External ears unremarkable. Left TM with injection, Right TM pearly gray.  Nose: Nares patent. Septum midline. Turbinates without erythema nor edema. No discharge.   Mouth/Throat: Dentition is intact. Tongue normal. Oropharynx is clear and moist. Posterior pharynx without erythema or exudates.  Neck: Supple, trachea midline. Normal range of motion. No lymphadenopathy--cervical or supraclavicular.  Cardiovascular: Regular rate and rhythm, S1 and S2 with systolic murmur.  No rubs or gallops.  Lungs: Normal inspiratory effort, CTA bilaterally, no wheezes/rhonchi/rales  Abdomen: Soft, non tender, and without distention. Active bowel sounds in all four quadrants. No rebound, guarding, masses or HSM.  Notes intermittent lower bilateral pelvic pain near ovaries no pain today.  Extremities: No cyanosis, clubbing, erythema, nor edema.   Musculoskeletal: All major joints AROM full in all directions without pain.  Neurological: Alert and oriented x 3.   Psychiatric:  Behavior, mood, and affect are appropriate.      Assessment and Plan:     1. Annual visit for general adult medical examination with abnormal findings  Acute uncomplicated problem.  Annual exam completed today. Discussion today about general wellness and lifestyle habits:  Engage in regular physical and social activities  Skin care, including sunscreen  Recommended annual eye exams and annual dental exams  Discussed wearing seatbelt when in car at all times    2. Systolic murmur  Acute uncomplicated problem.  Murmur on exam today.  She reports that she has seen cardiology in the past for bundle-branch block.  Last echocardiogram 2016 that showed a left EF of 75%, trace mitral regurgitation, mild to moderate aortic insufficiency, trace tricuspid regurgitation with an estimated right ventricular systolic pressure of 20 mmHg. check updated echocardiogram.  - EC-ECHOCARDIOGRAM COMPLETE W/O CONT; Future    3. Controlled type 2 diabetes mellitus without complication,  without long-term current use of insulin (HCC)  Chronic stable problem.  Continue follow-up with endocrinology.  Continue metformin ER 1000 mg twice daily, Jardiance 10 mg daily and semaglutide 0.5 mg every 7 days.    4. Dyslipidemia  Chronic stable problem.  Continue atorvastatin 20 mg daily.    5. Postoperative hypothyroidism  Chronic stable problem.  Continue levothyroxine 100 mcg daily on empty stomach 6 days a week with 1 day off.  Followed by endocrinology.    6. Essential hypertension  Chronic stable problem.  Continue enalapril 10 mg daily and hydrochlorothiazide 25 mg daily.    7. Hypokalemia  Chronic stable problem.  Continue potassium chloride 10 mEq daily.    8. Insomnia, unspecified type  Chronic stable problem.  Continue trazodone 100 mg at bedtime as needed for sleep.    9. ARMEN (generalized anxiety disorder)  Chronic stable problem.  Continue fluoxetine 40 mg daily.    10. Heterogeneously dense tissue of both breasts on mammography  Acute uncomplicated problem.  With her dense breast we discussed continuing annual mammograms and whole breast screening.  She would like to complete her [place] screening with mammogram next year.  - US-SCREENING WHOLE BREAST BILATERAL (3D SCREENING); Future    11. Screening for HIV (human immunodeficiency virus)  Acute uncomplicated problem.  Discussed screening and she is in agreement.  She will complete labs with upcoming thyroid labs for endocrinology  - HIV AG/AB COMBO ASSAY SCREENING; Future      HCM:  HIV screen ordered   Labs per orders  Immunizations per orders  Patient counseled about skin care, diet, supplements, prenatal vitamins, safe sex and exercise.    Follow-up: Return in about 7 months (around 1/20/2025) for Multiple Issues.        Please note that this dictation was created using voice recognition software. I have made every reasonable attempt to correct obvious errors, but I expect that there are errors of grammar and possibly content that I did not  discover before finalizing the note.

## 2024-06-20 NOTE — ASSESSMENT & PLAN NOTE
Continues ozempic 0.5 mg for next month then increase to 1 mg.  She did not take the the needle and was not getting her Ozempic dose but has not corrected and is getting her medication.  Continues Jardiance 10 mg daily and metformin ER 1000 mg twice daily.  She has been following with endocrinology.  Diabetes is controlled and patient may transfer back to PCP next year.

## 2024-06-21 NOTE — ASSESSMENT & PLAN NOTE
Blood pressure today 104/58.  Continues enalapril 10 mg daily, hydrochlorothiazide 25 mg daily and potassium chloride 10 mEq daily.  She is not having any chest pain, shortness of breath, dizziness, blurry vision or headaches.  Recent CMP WNL.

## 2024-06-25 ENCOUNTER — TELEPHONE (OUTPATIENT)
Dept: ENDOCRINOLOGY | Facility: MEDICAL CENTER | Age: 64
End: 2024-06-25
Payer: COMMERCIAL

## 2024-06-25 NOTE — TELEPHONE ENCOUNTER
Contact:  Phone number:[phone] (mobile)    Name of person spoken with and relationship to patient: Self   Patient’s Adherence:  How patient is doing on medication: Well    How many missed doses and reason: 0     Any new medications: No    Any new conditions: No    Any new allergies: No    Any new side effects: No    Any new diagnoses: No    How many doses remainin    Did patient want to speak with pharmacist: No   Delivery:  Delivery date and method: 2024 via     Needs by Date: 2024    Signature required: No     Any additional details for : N/A   Teach Appointment Date:  N/A   Shipping Address:  [address] [place] [address] [address] [place] [address]   Medication(name,strength and dose):  Ozempic 1mg/Dose 4mg/3mL Sopn (9mL/84ds)   Copay:  $50   Payment Method:  Credit card on file   Supplies:  None   Additional Information:  N/A     Thank you,  Sheeba [facility]  Endocrinology Pharmacy Coordinator

## 2024-06-26 ENCOUNTER — APPOINTMENT (OUTPATIENT)
Dept: RADIOLOGY | Facility: MEDICAL CENTER | Age: 64
End: 2024-06-26
Attending: NURSE PRACTITIONER
Payer: COMMERCIAL

## 2024-06-27 ENCOUNTER — PHARMACY VISIT (OUTPATIENT)
Dept: PHARMACY | Facility: MEDICAL CENTER | Age: 64
End: 2024-06-27
Payer: COMMERCIAL

## 2024-07-08 DIAGNOSIS — E11.9 CONTROLLED TYPE 2 DIABETES MELLITUS WITHOUT COMPLICATION, WITHOUT LONG-TERM CURRENT USE OF INSULIN (HCC): ICD-10-CM

## 2024-07-08 DIAGNOSIS — E89.0 POSTOPERATIVE HYPOTHYROIDISM: ICD-10-CM

## 2024-07-08 DIAGNOSIS — E78.5 DYSLIPIDEMIA: ICD-10-CM

## 2024-07-08 RX ORDER — METFORMIN HYDROCHLORIDE 500 MG/1
1000 TABLET, EXTENDED RELEASE ORAL 2 TIMES DAILY WITH MEALS
Qty: 360 TABLET | Refills: 3 | Status: SHIPPED | OUTPATIENT
Start: 2024-07-08

## 2024-07-08 RX ORDER — LEVOTHYROXINE SODIUM 0.1 MG/1
100 TABLET ORAL
Qty: 90 TABLET | Refills: 2 | Status: SHIPPED | OUTPATIENT
Start: 2024-07-08

## 2024-07-08 RX ORDER — ATORVASTATIN CALCIUM 20 MG/1
20 TABLET, FILM COATED ORAL DAILY
Qty: 90 TABLET | Refills: 3 | Status: SHIPPED | OUTPATIENT
Start: 2024-07-08

## 2024-07-08 RX ORDER — METFORMIN HYDROCHLORIDE 500 MG/1
TABLET, EXTENDED RELEASE ORAL 2 TIMES DAILY WITH MEALS
Qty: 360 TABLET | Refills: 3 | Status: SHIPPED | OUTPATIENT
Start: 2024-07-08

## 2024-07-09 ENCOUNTER — OFFICE VISIT (OUTPATIENT)
Dept: SLEEP MEDICINE | Facility: MEDICAL CENTER | Age: 64
End: 2024-07-09
Attending: NURSE PRACTITIONER
Payer: COMMERCIAL

## 2024-07-09 VITALS
WEIGHT: 136 LBS | HEIGHT: 62 IN | RESPIRATION RATE: 14 BRPM | OXYGEN SATURATION: 94 % | DIASTOLIC BLOOD PRESSURE: 84 MMHG | HEART RATE: 63 BPM | BODY MASS INDEX: 25.03 KG/M2 | SYSTOLIC BLOOD PRESSURE: 122 MMHG

## 2024-07-09 DIAGNOSIS — G47.33 OBSTRUCTIVE SLEEP APNEA: Chronic | ICD-10-CM

## 2024-07-09 DIAGNOSIS — R63.4 WEIGHT LOSS: ICD-10-CM

## 2024-07-09 DIAGNOSIS — Z78.9 NONSMOKER: ICD-10-CM

## 2024-07-09 DIAGNOSIS — G47.00 INSOMNIA, UNSPECIFIED TYPE: Chronic | ICD-10-CM

## 2024-07-09 PROCEDURE — 3074F SYST BP LT 130 MM HG: CPT | Performed by: NURSE PRACTITIONER

## 2024-07-09 PROCEDURE — 3079F DIAST BP 80-89 MM HG: CPT | Performed by: NURSE PRACTITIONER

## 2024-07-09 PROCEDURE — 99213 OFFICE O/P EST LOW 20 MIN: CPT | Performed by: NURSE PRACTITIONER

## 2024-07-25 ENCOUNTER — PATIENT MESSAGE (OUTPATIENT)
Dept: SLEEP MEDICINE | Facility: MEDICAL CENTER | Age: 64
End: 2024-07-25
Payer: COMMERCIAL

## 2024-08-02 ENCOUNTER — APPOINTMENT (OUTPATIENT)
Dept: CARDIOLOGY | Facility: MEDICAL CENTER | Age: 64
End: 2024-08-02
Attending: NURSE PRACTITIONER
Payer: COMMERCIAL

## 2024-08-02 DIAGNOSIS — R01.1 SYSTOLIC MURMUR: ICD-10-CM

## 2024-08-02 LAB
LV EJECT FRACT  99904: 55
LV EJECT FRACT MOD 2C 99903: 57.88
LV EJECT FRACT MOD 4C 99902: 52.55
LV EJECT FRACT MOD BP 99901: 55.43

## 2024-08-02 PROCEDURE — 93306 TTE W/DOPPLER COMPLETE: CPT | Mod: 26 | Performed by: INTERNAL MEDICINE

## 2024-08-02 PROCEDURE — 93306 TTE W/DOPPLER COMPLETE: CPT

## 2024-08-28 ENCOUNTER — TELEPHONE (OUTPATIENT)
Dept: PHARMACY | Facility: MEDICAL CENTER | Age: 64
End: 2024-08-28
Payer: COMMERCIAL

## 2024-08-28 PROCEDURE — RXMED WILLOW AMBULATORY MEDICATION CHARGE: Performed by: INTERNAL MEDICINE

## 2024-08-28 NOTE — TELEPHONE ENCOUNTER
Contact:  Phone number:[phone] (mobile)    Name of person spoken with and relationship to patient: Koki patient   Patient’s Adherence:  How patient is doing on medication: Very Well    How many missed doses and reason: 0 N/A    Any new medications: No    Any new conditions: No    Any new allergies: No    Any new side effects: No    Any new diagnoses: No    How many doses remainin    Did patient want to speak with pharmacist: No   Delivery:  Delivery date and method: 2024 via NONE    Needs by Date: 2024    Signature required: No     Any additional details for : N/A   Teach Appointment Date:  N/A   Shipping Address:  [address] [place] [address] [address] [place] [id number]   Medication(name,strength and dose):  JARDIANCE TABLET 10 MG   Copay:  $10.00   Payment Method:  Credit card on file   Supplies:  NONE   Additional Information:  NONE     Masha Dunn, Pharmacy Liaison/ RX Coordinator

## 2024-08-29 ENCOUNTER — OFFICE VISIT (OUTPATIENT)
Dept: URGENT CARE | Facility: PHYSICIAN GROUP | Age: 64
End: 2024-08-29
Payer: COMMERCIAL

## 2024-08-29 ENCOUNTER — PHARMACY VISIT (OUTPATIENT)
Dept: PHARMACY | Facility: MEDICAL CENTER | Age: 64
End: 2024-08-29
Payer: COMMERCIAL

## 2024-08-29 VITALS
BODY MASS INDEX: 23.74 KG/M2 | TEMPERATURE: 97 F | HEART RATE: 74 BPM | HEIGHT: 62 IN | WEIGHT: 129 LBS | SYSTOLIC BLOOD PRESSURE: 114 MMHG | RESPIRATION RATE: 14 BRPM | DIASTOLIC BLOOD PRESSURE: 68 MMHG | OXYGEN SATURATION: 95 %

## 2024-08-29 DIAGNOSIS — U07.1 COVID-19: ICD-10-CM

## 2024-08-29 LAB
FLUAV RNA SPEC QL NAA+PROBE: NEGATIVE
FLUBV RNA SPEC QL NAA+PROBE: NEGATIVE
RSV RNA SPEC QL NAA+PROBE: NEGATIVE
SARS-COV-2 RNA RESP QL NAA+PROBE: POSITIVE

## 2024-08-29 RX ORDER — BENZONATATE 100 MG/1
100 CAPSULE ORAL 3 TIMES DAILY PRN
Qty: 30 CAPSULE | Refills: 0 | Status: SHIPPED | OUTPATIENT
Start: 2024-08-29 | End: 2024-09-08

## 2024-08-29 ASSESSMENT — ENCOUNTER SYMPTOMS
ABDOMINAL PAIN: 0
DIZZINESS: 0
SHORTNESS OF BREATH: 0
FEVER: 0

## 2024-08-29 NOTE — LETTER
August 29, 2024         Patient: Koki Townsend   YOB: 1960   Date of Visit: 8/29/2024           To Whom it May Concern:    Koki Townsend was seen in my clinic on 8/29/2024. Please excuse any absences related to this illness, they must be fever free for 24 hours without medication prior to returning to work.      If you have any questions or concerns, please don't hesitate to call.        Sincerely,           DES Carrington  Electronically Signed

## 2024-08-30 NOTE — PROGRESS NOTES
Subjective:   Koki Townsend is a 63 y.o. female who presents for Cough (X2days, loss of appetite, sore throat, fatigue, congestion)    HPI  4 days of malaise, congestion, runny nose, sneezing, sore throat, coughing. No known sick exposures. No chronic lung issues, hx of kidney dysfunction last egfr 72ml/min. Using tylenol, cough medication. Hx of dm2, last A1c 5.4%.  Immunizations current.  Tolerating p.o.    Review of Systems   Constitutional:  Negative for fever.   Respiratory:  Negative for shortness of breath.    Cardiovascular:  Negative for chest pain.   Gastrointestinal:  Negative for abdominal pain.   Skin:  Negative for rash.   Neurological:  Negative for dizziness.       Allergies   Allergen Reactions    Codeine Itching       Patient Active Problem List    Diagnosis Date Noted    Weight loss 07/09/2024    Systolic murmur 06/20/2024    Heterogeneously dense tissue of both breasts on mammography 06/20/2024    Positive screening for depression on 9-item Patient Health Questionnaire (PHQ-9) 09/19/2023    Lump of skin of lower extremity, right 12/01/2022    Hypokalemia 05/13/2022    Long term (current) use of oral hypoglycemic drugs 11/05/2021    Dyslipidemia 09/16/2020    Controlled type 2 diabetes mellitus without complication, without long-term current use of insulin (HCC) 07/03/2020    ARMEN (generalized anxiety disorder) 01/04/2018    Hypothyroidism 07/31/2017    Insomnia 02/28/2017    Obstructive sleep apnea 01/25/2017    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 30 Capsule 0    metFORMIN ER (GLUCOPHAGE XR) 500 MG TABLET SR 24 HR Take 2 tablets by mouth 2 times a day with meals.  "360 Tablet 3    levothyroxine (SYNTHROID) 100 MCG Tab Take 1 tablet by mouth every morning on an empty stomach. 90 Tablet 2    metFORMIN ER (GLUCOPHAGE XR) 500 MG TABLET SR 24 HR TAKE TWO TABLETS BY MOUTH TWICE DAILY WITH MEALS 360 Tablet 3    atorvastatin (LIPITOR) 20 MG Tab TAKE ONE TABLET BY MOUTH ONCE DAILY 90 Tablet 3    Semaglutide, 1 MG/DOSE, (OZEMPIC, 1 MG/DOSE,) 4 MG/3ML Solution Pen-injector Inject 1 mg under the skin every 7 days. 9 mL 2    fluoxetine (PROZAC) 40 MG capsule Take 1 Capsule by mouth every day. 90 Capsule 3    potassium chloride ER (KLOR-CON) 10 MEQ tablet Take 1 Tablet by mouth every day. 90 Tablet 3    Empagliflozin (JARDIANCE) 10 MG Tab tablet Take 1 tablet by mouth every day. 90 Tablet 3    hydroCHLOROthiazide 25 MG Tab Take 1 Tablet by mouth every day. 90 Tablet 3    enalapril (VASOTEC) 10 MG Tab Take 1 Tablet by mouth every day. 90 Tablet 3    traZODone (DESYREL) 100 MG Tab Take 1 Tablet by mouth at bedtime as needed for Sleep. 90 Tablet 3     No current Epic-ordered facility-administered medications on file.       Past Surgical History:   Procedure Laterality Date    SINUSCOPE      THYROIDECTOMY Left 2000's    Left lobe removed    TONSILLECTOMY      TUBAL LIGATION         Social History     Tobacco Use    Smoking status: Never    Smokeless tobacco: Never   Vaping Use    Vaping status: Never Used   Substance Use Topics    Alcohol use: No     Alcohol/week: 0.0 oz    Drug use: Not Currently       family history includes Cancer in her father and paternal grandmother; Diabetes in her mother and sister; Heart Disease in her maternal grandmother and sister; Heart Failure in her mother; Lung Cancer in her father; No Known Problems in her brother; Sleep Apnea in her brother and sister.     Problem list, medications, and allergies reviewed by myself today in Epic.     Objective:   /68   Pulse 74   Temp 36.1 °C (97 °F) (Temporal)   Resp 14   Ht 1.575 m (5' 2\")   Wt 58.5 kg (129 lb)   " SpO2 95%   BMI 23.59 kg/m²     Physical Exam  Vitals and nursing note reviewed.   Constitutional:       General: She is not in acute distress.     Appearance: Normal appearance. She is well-developed. She is not ill-appearing, toxic-appearing or diaphoretic.   HENT:      Head: Normocephalic and atraumatic.      Right Ear: Tympanic membrane, ear canal and external ear normal.      Left Ear: Tympanic membrane, ear canal and external ear normal.      Nose: Congestion and rhinorrhea present.      Mouth/Throat:      Mouth: Mucous membranes are moist.      Pharynx: Oropharynx is clear. Uvula midline. Postnasal drip present. No oropharyngeal exudate or posterior oropharyngeal erythema.      Comments: Clear speech.  Managing oral secretions.  Eyes:      General:         Right eye: No discharge.         Left eye: No discharge.      Conjunctiva/sclera: Conjunctivae normal.   Cardiovascular:      Rate and Rhythm: Normal rate and regular rhythm.      Pulses: Normal pulses.      Heart sounds: Normal heart sounds.   Pulmonary:      Effort: Pulmonary effort is normal. No respiratory distress.      Breath sounds: Normal breath sounds. No wheezing, rhonchi or rales.   Musculoskeletal:      Cervical back: Normal range of motion and neck supple.      Right lower leg: No edema.      Left lower leg: No edema.   Lymphadenopathy:      Cervical: No cervical adenopathy.   Skin:     General: Skin is warm and dry.   Neurological:      General: No focal deficit present.      Mental Status: She is alert and oriented to person, place, and time. Mental status is at baseline.   Psychiatric:         Mood and Affect: Mood normal.         Behavior: Behavior normal.         Thought Content: Thought content normal.         Judgment: Judgment normal.         Lab Results/POC Test Results   Results for orders placed or performed in visit on 08/29/24   POCT CoV-2, Flu A/B, RSV by PCR   Result Value Ref Range    SARS-CoV-2 by PCR Positive (A) Negative,  Invalid    Influenza virus A RNA Negative Negative, Invalid    Influenza virus B, PCR Negative Negative, Invalid    RSV, PCR Negative Negative, Invalid           Assessment/Plan:     I personally reviewed prior external notes and test results pertinent to today's visit as well as additional imaging and testing completed in clinic today.    I introduced myself as Azam Barry a Nurse Practitioner.    1. COVID-19  benzonatate (TESSALON) 100 MG Cap    POCT CoV-2, Flu A/B, RSV by PCR      TESTING: Cepheid viral testing positive for COVID-19  VITAL SIGNS: WNL  MDM/PLAN: No signs of distress.  Talking full sentences.  No adventitious lung sounds.  Does have symptoms consistent with COVID-19 and on exam does have congestion rhinorrhea and postnasal drainage..  Overall, unremarkable exam w/o red flag signs or symptoms.  We did have discussion about Paxlovid but patient declined.    Offered reassurance  OTC Tylenol  Benzonatate for cough  Pulmonary toilet  Recommend adequate hydration   Rest  Return precautions discussed      Medication discussed included indication for use and the potential benefits and side effects. The Patient was encouraged to monitor symptoms closely, and we reviewed the signs and symptoms that require a higher level of care through the emergency department. Patient verbalized understanding.    Please note that this dictation was created using voice recognition software. I have made every reasonable attempt to correct obvious errors, but I expect that there are errors of grammar and possibly content that I did not discover before finalizing the note.    This note was electronically signed by DES Carrington

## 2024-09-15 PROCEDURE — RXMED WILLOW AMBULATORY MEDICATION CHARGE: Performed by: INTERNAL MEDICINE

## 2024-09-24 ENCOUNTER — PHARMACY VISIT (OUTPATIENT)
Dept: PHARMACY | Facility: MEDICAL CENTER | Age: 64
End: 2024-09-24
Payer: COMMERCIAL

## 2024-09-27 ENCOUNTER — PHARMACY VISIT (OUTPATIENT)
Dept: PHARMACY | Facility: MEDICAL CENTER | Age: 64
End: 2024-09-27
Payer: COMMERCIAL

## 2024-09-27 PROCEDURE — RXMED WILLOW AMBULATORY MEDICATION CHARGE: Performed by: INTERNAL MEDICINE

## 2024-09-27 RX ORDER — INFLUENZA A VIRUS A/VICTORIA/4897/2022 IVR-238 (H1N1) ANTIGEN (FORMALDEHYDE INACTIVATED), INFLUENZA A VIRUS A/CALIFORNIA/122/2022 SAN-022 (H3N2) ANTIGEN (FORMALDEHYDE INACTIVATED), AND INFLUENZA B VIRUS B/MICHIGAN/01/2021 ANTIGEN (FORMALDEHYDE INACTIVATED) 15; 15; 15 MG/.5ML; MG/.5ML; MG/.5ML
INJECTION, SUSPENSION INTRAMUSCULAR
Qty: 0.5 ML | Refills: 0 | OUTPATIENT
Start: 2024-09-27

## 2024-09-27 RX ORDER — COVID-19 VACCINE, MRNA 0.04 MG/.418ML
INJECTION, SUSPENSION INTRAMUSCULAR
Qty: 0.3 ML | Refills: 0 | OUTPATIENT
Start: 2024-09-27

## 2024-10-24 ENCOUNTER — OFFICE VISIT (OUTPATIENT)
Dept: SLEEP MEDICINE | Facility: MEDICAL CENTER | Age: 64
End: 2024-10-24
Attending: NURSE PRACTITIONER
Payer: COMMERCIAL

## 2024-10-24 VITALS
SYSTOLIC BLOOD PRESSURE: 122 MMHG | HEART RATE: 72 BPM | OXYGEN SATURATION: 94 % | HEIGHT: 62 IN | RESPIRATION RATE: 16 BRPM | WEIGHT: 130 LBS | DIASTOLIC BLOOD PRESSURE: 72 MMHG | BODY MASS INDEX: 23.92 KG/M2

## 2024-10-24 DIAGNOSIS — G47.33 OBSTRUCTIVE SLEEP APNEA: ICD-10-CM

## 2024-10-24 DIAGNOSIS — Z78.9 NONSMOKER: ICD-10-CM

## 2024-10-24 PROCEDURE — 99213 OFFICE O/P EST LOW 20 MIN: CPT | Performed by: NURSE PRACTITIONER

## 2024-10-24 PROCEDURE — 3074F SYST BP LT 130 MM HG: CPT | Performed by: NURSE PRACTITIONER

## 2024-10-24 PROCEDURE — 3078F DIAST BP <80 MM HG: CPT | Performed by: NURSE PRACTITIONER

## 2024-11-27 ENCOUNTER — TELEPHONE (OUTPATIENT)
Dept: PHARMACY | Facility: MEDICAL CENTER | Age: 64
End: 2024-11-27
Payer: COMMERCIAL

## 2024-11-27 PROCEDURE — RXMED WILLOW AMBULATORY MEDICATION CHARGE: Performed by: INTERNAL MEDICINE

## 2024-11-27 NOTE — TELEPHONE ENCOUNTER
Contact:  Phone number:[phone] (mobile)    Name of person spoken with and relationship to patient: Koki patient   Patient’s Adherence:  How patient is doing on medication: Very Well    How many missed doses and reason: 0 N/A    Any new medications: No    Any new conditions: No    Any new allergies: No    Any new side effects: No    Any new diagnoses: No    How many doses remainin    Did patient want to speak with pharmacist: No   Delivery:  Delivery date and method: 2024 via     Needs by Date: 2024    Signature required: No     Any additional details for : N/A   Teach Appointment Date:  N/A   Shipping Address:  [address] [place] [address] [address] [place] [id number]   Medication(name,strength and dose):  Jardiance Tablet 10 MG, Ozempic (1 MG/DOSE) Solution Pen-injector 4 MG/3ML   Copay:  $60.00   Payment Method:  Credit card on file   Supplies:  Alcohol Swabs   Additional Information:  NONE     Masha Dunn, Pharmacy Liaison/ RX Coordinator

## 2024-12-03 ENCOUNTER — PHARMACY VISIT (OUTPATIENT)
Dept: PHARMACY | Facility: MEDICAL CENTER | Age: 64
End: 2024-12-03
Payer: COMMERCIAL

## 2025-01-09 ENCOUNTER — TELEPHONE (OUTPATIENT)
Dept: ENDOCRINOLOGY | Facility: MEDICAL CENTER | Age: 65
End: 2025-01-09
Payer: COMMERCIAL

## 2025-01-10 ENCOUNTER — HOSPITAL ENCOUNTER (OUTPATIENT)
Dept: LAB | Facility: MEDICAL CENTER | Age: 65
End: 2025-01-10
Attending: INTERNAL MEDICINE
Payer: COMMERCIAL

## 2025-01-10 DIAGNOSIS — Z11.4 SCREENING FOR HIV (HUMAN IMMUNODEFICIENCY VIRUS): ICD-10-CM

## 2025-01-10 DIAGNOSIS — E89.0 POSTOPERATIVE HYPOTHYROIDISM: ICD-10-CM

## 2025-01-10 LAB
HIV 1+2 AB+HIV1 P24 AG SERPL QL IA: NORMAL
T3FREE SERPL-MCNC: 2.61 PG/ML (ref 2–4.4)
T4 FREE SERPL-MCNC: 1.64 NG/DL (ref 0.93–1.7)
TSH SERPL-ACNC: 1.69 UIU/ML (ref 0.35–5.5)

## 2025-01-10 PROCEDURE — 84443 ASSAY THYROID STIM HORMONE: CPT

## 2025-01-10 PROCEDURE — 36415 COLL VENOUS BLD VENIPUNCTURE: CPT

## 2025-01-10 PROCEDURE — 87389 HIV-1 AG W/HIV-1&-2 AB AG IA: CPT

## 2025-01-10 PROCEDURE — 84439 ASSAY OF FREE THYROXINE: CPT

## 2025-01-10 PROCEDURE — 84481 FREE ASSAY (FT-3): CPT

## 2025-01-16 ENCOUNTER — OFFICE VISIT (OUTPATIENT)
Dept: ENDOCRINOLOGY | Facility: MEDICAL CENTER | Age: 65
End: 2025-01-16
Attending: INTERNAL MEDICINE
Payer: COMMERCIAL

## 2025-01-16 VITALS
SYSTOLIC BLOOD PRESSURE: 112 MMHG | OXYGEN SATURATION: 96 % | BODY MASS INDEX: 24.29 KG/M2 | WEIGHT: 132 LBS | DIASTOLIC BLOOD PRESSURE: 56 MMHG | HEIGHT: 62 IN | HEART RATE: 70 BPM

## 2025-01-16 DIAGNOSIS — Z79.84 LONG TERM (CURRENT) USE OF ORAL HYPOGLYCEMIC DRUGS: ICD-10-CM

## 2025-01-16 DIAGNOSIS — E89.0 POSTOPERATIVE HYPOTHYROIDISM: ICD-10-CM

## 2025-01-16 DIAGNOSIS — E78.5 DYSLIPIDEMIA: ICD-10-CM

## 2025-01-16 DIAGNOSIS — E11.9 CONTROLLED TYPE 2 DIABETES MELLITUS WITHOUT COMPLICATION, WITHOUT LONG-TERM CURRENT USE OF INSULIN (HCC): ICD-10-CM

## 2025-01-16 DIAGNOSIS — E55.9 VITAMIN D DEFICIENCY: ICD-10-CM

## 2025-01-16 LAB
HBA1C MFR BLD: 5.8 % (ref ?–5.8)
POCT INT CON NEG: NEGATIVE
POCT INT CON POS: POSITIVE

## 2025-01-16 PROCEDURE — 99212 OFFICE O/P EST SF 10 MIN: CPT | Performed by: INTERNAL MEDICINE

## 2025-01-16 PROCEDURE — 3074F SYST BP LT 130 MM HG: CPT | Performed by: INTERNAL MEDICINE

## 2025-01-16 PROCEDURE — 3078F DIAST BP <80 MM HG: CPT | Performed by: INTERNAL MEDICINE

## 2025-01-16 PROCEDURE — 99214 OFFICE O/P EST MOD 30 MIN: CPT | Performed by: INTERNAL MEDICINE

## 2025-01-16 PROCEDURE — 83036 HEMOGLOBIN GLYCOSYLATED A1C: CPT | Performed by: INTERNAL MEDICINE

## 2025-01-16 RX ORDER — METFORMIN HYDROCHLORIDE 500 MG/1
1000 TABLET, EXTENDED RELEASE ORAL 2 TIMES DAILY WITH MEALS
Qty: 360 TABLET | Refills: 3 | Status: SHIPPED | OUTPATIENT
Start: 2025-01-16

## 2025-01-16 RX ORDER — LEVOTHYROXINE SODIUM 100 UG/1
100 TABLET ORAL
Qty: 90 TABLET | Refills: 3 | Status: SHIPPED | OUTPATIENT
Start: 2025-01-16

## 2025-01-16 RX ORDER — SEMAGLUTIDE 1.34 MG/ML
1 INJECTION, SOLUTION SUBCUTANEOUS
Qty: 9 ML | Refills: 2 | Status: SHIPPED | OUTPATIENT
Start: 2025-01-16

## 2025-01-16 RX ORDER — EMPAGLIFLOZIN 10 MG/1
TABLET, FILM COATED ORAL DAILY
Qty: 90 TABLET | Refills: 3 | Status: SHIPPED | OUTPATIENT
Start: 2025-01-16

## 2025-01-17 NOTE — PROGRESS NOTES
CHIEF COMPLAINT: Patient is here for follow up of Type 2 Diabetes Mellitus.     HPI:     Koki Townsend is a 64 y.o. female with Type 2 Diabetes Mellitus here for follow up.      Labs from 1/16/2025 show A1c is 5.8%  Labs from 12/14/2023 show A1c is 5.6%  Labs from 5/18/2023 show A1c was 6.1%  Labs from 12/2/2022 show A1c was 6.3%  Labs from 5/13/2022 show A1c was 5.8%  Labs from 10/25/2021 show A1c was 5.9%  Labs from  3/19/2021 show A1c was 5.9%  Labs from 12/15/2020 show A1c was 5.9%      Her comorbid conditions include hyperlipidemia, hypertension, obesity and obstructive sleep apnea and history of anxiety.  She has a CPAP        On follow up she is on   Metformin ER 500mg 2 pills bid   Jardiance 10 mg daily  Ozempic 1 mg weekly      She denies SE       She has hyperlipidemia and is on Atorvastatin  She is tolerating this well  She denies a hx of CAD   Latest Reference Range & Units 06/14/24 09:40   Cholesterol,Tot 100 - 199 mg/dL 91 (L)   Triglycerides 0 - 149 mg/dL 82   HDL >=40 mg/dL 46   LDL <100 mg/dL 29         She has HTN that is controlled  She is taking an ACE inhibitor along with HCTZ plus Kdur  Blood pressure today is well controlled  Microalbuminuria has resolved  on recent labs      Latest Reference Range & Units 06/14/24 09:40   Micro Alb Creat Ratio 0 - 30 mg/g see below   Creatinine, Urine mg/dL 101.35   Microalbumin, Urine Random mg/dL <1.2         She gets her eye exam with Dr. 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 Hines  with pathology returning as benign  She is currently on Levothyroxine 100mcg daily 6 days a week   which has been her dose for 6 months  She reports good compliance  She denies symptoms of uncontrolled hypothyroidism such as constipation cold intolerance  She also denies palpitations and tremors  TSH is 1.69 on 1/10/2025  TSH is 0.570 on 12/11/2023 (100mcg MN-SAT 1/2 SUN)  TSH  was 0.570 on 10/2022 (100mcg MN-SAT 1/2 SUN)  TSH was low 0.220 on 5/9/2022 (100mcg daily)  TSH was 0.520 on 10/2021 (100mcg daily)  TSH was  0.800 on 10/2021 (100mcg daily)        She has a history of vitamin D deficiency and is taking vitamin D3 2000 units daily  Last vitamin D level was adequate at 64 on 6/2024        BG Diary:  She doesn't check her BG    Weight has been stable    Diabetes Complications   Retinopathy: No known retinopathy.  Last eye exam:  10/2024  Neuropathy: Denies paresthesias or numbness in hands or feet. Denies any foot wounds.  Exercise: Minimal.  Diet: Fair.  Patient's medications, allergies, and social histories were reviewed and updated as appropriate.    ROS:     CONS:     No fever, no chills   EYES:     No diplopia, no blurry vision   CV:           No chest pain, no palpitations   PULM:     No SOB, no cough, no hemoptysis.   GI:            No nausea, no vomiting, no diarrhea, no constipation   ENDO:     No polyuria, no polydipsia, no heat intolerance, no cold intolerance       Past Medical History:  Problem List:  2024-07: Weight loss  2024-06: Systolic murmur  2024-06: Heterogeneously dense tissue of both breasts on mammography  2023-09: Positive screening for depression on 9-item Patient Health   Questionnaire (PHQ-9)  2022-12: Lump of skin of lower extremity, right  2022-05: Hypokalemia  2021-11: Controlled substance agreement signed  2021-11: Long term (current) use of oral hypoglycemic drugs  2020-09: Dyslipidemia  2020-07: Controlled type 2 diabetes mellitus without complication,   without long-term current use of insulin (HCC)  2020-07: Soft tissue mass  2020-06: Obesity (BMI 30-39.9)  2020-05: Elevated LFTs  2019-10: Breast cancer screening  2019-07: Anxiety  2018-01: ARMEN (generalized anxiety disorder)  2017-07: Hypothyroidism  2017-04: BMI 32.0-32.9,adult  2017-02: Insomnia  2017-02: Hypothyroidism  2017-01: Obstructive sleep apnea  2015-12: Impaired fasting blood  "sugar  2015-12: CKD (chronic kidney disease) stage 2, GFR 60-89 ml/min  2015-12: Other specified hypothyroidism  2015-12: Essential hypertension  2012-12: Health examination of defined subpopulation  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      Past Surgical History:  Past Surgical History:   Procedure Laterality Date    SINUSCOPE      THYROIDECTOMY Left 2000's    Left lobe removed    TONSILLECTOMY      TUBAL LIGATION          Allergies:  Codeine     Social History:  Social History     Tobacco Use    Smoking status: Never    Smokeless tobacco: Never   Vaping Use    Vaping status: Never Used   Substance Use Topics    Alcohol use: No     Alcohol/week: 0.0 oz    Drug use: Not Currently        Family History:   family history includes Cancer in her father and paternal grandmother; Diabetes in her mother and sister; Heart Disease in her maternal grandmother and sister; Heart Failure in her mother; Lung Cancer in her father; No Known Problems in her brother; Sleep Apnea in her brother and sister.      PHYSICAL EXAM:   OBJECTIVE:  Vital signs: /56 (BP Location: Left arm, Patient Position: Sitting, BP Cuff Size: Adult)   Pulse 70   Ht 1.575 m (5' 2\")   Wt 59.9 kg (132 lb)   SpO2 96%   BMI 24.14 kg/m²   GENERAL: Well-developed, well-nourished in no apparent distress.   EYE:  No ocular asymmetry, PERRLA  HENT: Pink, moist mucous membranes.    NECK: No thyromegaly.   CARDIOVASCULAR:  No murmurs  LUNGS: Clear breath sounds  ABDOMEN: Soft, nontender   EXTREMITIES: No clubbing, cyanosis, or edema.   NEUROLOGICAL: No gross focal motor abnormalities   LYMPH: No cervical adenopathy seen  SKIN: No rashes, lesions.     Labs:  Lab Results   Component Value Date/Time    HBA1C 5.8 01/16/2025 04:15 PM        Lab Results   Component Value Date/Time    WBC 5.4 12/15/2020 06:05 AM    RBC 4.89 12/15/2020 06:05 AM    HEMOGLOBIN 13.8 12/15/2020 06:05 AM    MCV 87.1 12/15/2020 06:05 AM    MCH 28.2 " 12/15/2020 06:05 AM    MCHC 32.4 (L) 12/15/2020 06:05 AM    RDW 40.3 12/15/2020 06:05 AM    MPV 12.0 12/15/2020 06:05 AM       Lab Results   Component Value Date/Time    SODIUM 142 06/14/2024 09:40 AM    POTASSIUM 4.1 06/14/2024 09:40 AM    CHLORIDE 105 06/14/2024 09:40 AM    CO2 25 06/14/2024 09:40 AM    ANION 12.0 06/14/2024 09:40 AM    GLUCOSE 89 06/14/2024 09:40 AM    BUN 16 06/14/2024 09:40 AM    CREATININE 0.90 06/14/2024 09:40 AM    CALCIUM 10.2 06/14/2024 09:40 AM    ASTSGOT 18 06/14/2024 09:40 AM    ALTSGPT 16 06/14/2024 09:40 AM    TBILIRUBIN 1.0 06/14/2024 09:40 AM    ALBUMIN 4.1 06/14/2024 09:40 AM    TOTPROTEIN 7.0 06/14/2024 09:40 AM    GLOBULIN 2.9 06/14/2024 09:40 AM    AGRATIO 1.4 06/14/2024 09:40 AM       Lab Results   Component Value Date/Time    CHOLSTRLTOT 182 09/04/2020 0703    TRIGLYCERIDE 121 09/04/2020 0703    HDL 44 09/04/2020 0703     (H) 09/04/2020 0703       Lab Results   Component Value Date/Time    MALBCRT see below 06/14/2024 09:40 AM    MICROALBUR <1.2 06/14/2024 09:40 AM        Lab Results   Component Value Date/Time    TSHULTRASEN 0.681 12/15/2020 0605     No results found for: FREEDIR  No results found for: FREET3  No results found for: THYSTIMIG        ASSESSMENT/PLAN:     1. Controlled type 2 diabetes mellitus without complication, without long-term current use of insulin (HCC)  Well-controlled   Continue Metformin and Jardiance   Continue Ozempic   Reviewed proper administration of medication and side effects and alternatives  She is up to date with her labs  Follow-up with [place] in 6 months w/ complete labs     2. Postoperative hypothyroidism  Controlled   Tsh is stable at 1.69 opn 1.2025  Continue Levothyroxine 100 mcg  6 days a week  Repeat TSH levels in 6 months    3. Dyslipidemia  Well-controlled   Continue atorvastatin   Follow low-fat diet  Repeat fasting lipids after 6 months    4. Vitamin D deficiency  Controlled  Continue  vitamin D3 2000 IU daily  Vitamin D  labs were reviewed with patient  Continue current supplements  Continue monitoring levels       5. Long term (current) use of oral hypoglycemic drugs  Patient is on oral agents for type 2 diabetes management      Return in about 6 months (around 7/16/2025).      Thank you kindly for allowing me to participate in the diabetes care plan for this patient.    Magnus Bourne MD, FACE, [facility]      CC:   DES Dye

## 2025-01-23 ENCOUNTER — OFFICE VISIT (OUTPATIENT)
Dept: MEDICAL GROUP | Facility: PHYSICIAN GROUP | Age: 65
End: 2025-01-23
Payer: COMMERCIAL

## 2025-01-23 VITALS
BODY MASS INDEX: 23.74 KG/M2 | DIASTOLIC BLOOD PRESSURE: 68 MMHG | OXYGEN SATURATION: 96 % | SYSTOLIC BLOOD PRESSURE: 130 MMHG | WEIGHT: 129 LBS | HEIGHT: 62 IN | HEART RATE: 66 BPM | TEMPERATURE: 98.5 F

## 2025-01-23 DIAGNOSIS — E11.9 CONTROLLED TYPE 2 DIABETES MELLITUS WITHOUT COMPLICATION, WITHOUT LONG-TERM CURRENT USE OF INSULIN (HCC): ICD-10-CM

## 2025-01-23 DIAGNOSIS — F41.1 GENERALIZED ANXIETY DISORDER: ICD-10-CM

## 2025-01-23 DIAGNOSIS — G47.00 INSOMNIA, UNSPECIFIED TYPE: Chronic | ICD-10-CM

## 2025-01-23 DIAGNOSIS — I10 ESSENTIAL HYPERTENSION: ICD-10-CM

## 2025-01-23 DIAGNOSIS — E87.6 HYPOKALEMIA: ICD-10-CM

## 2025-01-23 PROCEDURE — 99214 OFFICE O/P EST MOD 30 MIN: CPT | Performed by: NURSE PRACTITIONER

## 2025-01-23 PROCEDURE — 3075F SYST BP GE 130 - 139MM HG: CPT | Performed by: NURSE PRACTITIONER

## 2025-01-23 PROCEDURE — 3078F DIAST BP <80 MM HG: CPT | Performed by: NURSE PRACTITIONER

## 2025-01-23 RX ORDER — FLUOXETINE 40 MG/1
40 CAPSULE ORAL DAILY
Qty: 90 CAPSULE | Refills: 3 | Status: SHIPPED | OUTPATIENT
Start: 2025-01-23

## 2025-01-23 RX ORDER — TRAZODONE HYDROCHLORIDE 100 MG/1
100 TABLET ORAL NIGHTLY PRN
Qty: 90 TABLET | Refills: 3 | Status: SHIPPED | OUTPATIENT
Start: 2025-01-23

## 2025-01-23 RX ORDER — HYDROCHLOROTHIAZIDE 25 MG/1
25 TABLET ORAL DAILY
Qty: 90 TABLET | Refills: 3 | Status: SHIPPED | OUTPATIENT
Start: 2025-01-23

## 2025-01-23 RX ORDER — ENALAPRIL MALEATE 10 MG/1
10 TABLET ORAL DAILY
Qty: 90 TABLET | Refills: 3 | Status: SHIPPED | OUTPATIENT
Start: 2025-01-23

## 2025-01-23 RX ORDER — POTASSIUM CHLORIDE 750 MG/1
10 TABLET, EXTENDED RELEASE ORAL DAILY
Qty: 90 TABLET | Refills: 3 | Status: SHIPPED | OUTPATIENT
Start: 2025-01-23

## 2025-01-23 ASSESSMENT — PATIENT HEALTH QUESTIONNAIRE - PHQ9: CLINICAL INTERPRETATION OF PHQ2 SCORE: 0

## 2025-01-24 NOTE — ASSESSMENT & PLAN NOTE
Followed by endocrinology.  Recent A1c 5.8%.  Controlled with empagliflozin 10 mg daily, metformin 1000 mg twice daily and Ozempic 1 mg every 7 days.  She does not check her blood sugar right now.  We discussed that she can check her blood sugar if she is having signs or symptoms of high or low blood sugar.

## 2025-01-24 NOTE — ASSESSMENT & PLAN NOTE
Continues trazodone 100 mg nightly as needed for sleep.  She does drink sleepy time tea and has noticed that intermittently taking her trazodone and sleepy time tea together can increase her drowsiness.  She does not wake up feeling drowsy or tired.  We discussed that she can try taking one of the other to see if this improves.  We also discussed that she can try 50 mg dose of trazodone.

## 2025-01-24 NOTE — PROGRESS NOTES
Subjective:     CC: 6-month follow-up    HPI:   Koki presents today with the following:    Insomnia  Continues trazodone 100 mg nightly as needed for sleep.  She does drink sleepy time tea and has noticed that intermittently taking her trazodone and sleepy time tea together can increase her drowsiness.  She does not wake up feeling drowsy or tired.  We discussed that she can try taking one of the other to see if this improves.  We also discussed that she can try 50 mg dose of trazodone.    Essential hypertension  BP today 130/68.  Continues enalapril 10 mg daily and hydrochlorothiazide 25 mg daily.    Controlled type 2 diabetes mellitus without complication, without long-term current use of insulin ([facility])  Followed by endocrinology.  Recent A1c 5.8%.  Controlled with empagliflozin 10 mg daily, metformin 1000 mg twice daily and Ozempic 1 mg every 7 days.  She does not check her blood sugar right now.  We discussed that she can check her blood sugar if she is having signs or symptoms of high or low blood sugar.      Past Medical History:   Diagnosis Date    Anxiety     Breast cancer screening 10/9/2019     IMO load March 2020    Chickenpox     Dyslipidemia 9/16/2020    [place] measles     Hypertension     Hypothyroidism     Nasal drainage     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     Sleep apnea     Soft tissue mass 7/3/2020    Thyroid disease     Tonsillitis        Social History     Tobacco Use    Smoking status: Never    Smokeless tobacco: Never   Vaping Use    Vaping status: Never Used   Substance Use Topics    Alcohol use: No     Alcohol/week: 0.0 oz    Drug use: Not Currently       Current Outpatient Medications Ordered in Epic   Medication Sig Dispense Refill    enalapril (VASOTEC) 10 MG Tab Take 1 Tablet by mouth every day. 90 Tablet 3    fluoxetine (PROZAC) 40 MG capsule Take 1 Capsule by mouth every day. 90 Capsule 3    hydroCHLOROthiazide 25 MG Tab Take 1 Tablet by mouth every  "day. 90 Tablet 3    traZODone (DESYREL) 100 MG Tab Take 1 Tablet by mouth at bedtime as needed for Sleep. 90 Tablet 3    Semaglutide, 1 MG/DOSE, (OZEMPIC, 1 MG/DOSE,) 4 MG/3ML Solution Pen-injector Inject 1 mg under the skin every 7 days. 9 mL 2    metFORMIN ER (GLUCOPHAGE XR) 500 MG TABLET SR 24 HR Take 2 Tablets by mouth 2 times a day with meals. 360 Tablet 3    Empagliflozin (JARDIANCE) 10 MG Tab tablet Take 1 tablet by mouth every day. 90 Tablet 3    levothyroxine (SYNTHROID) 100 MCG Tab Take 1 Tablet by mouth every morning on an empty stomach. But skip Sunday 90 Tablet 3    atorvastatin (LIPITOR) 20 MG Tab TAKE ONE TABLET BY MOUTH ONCE DAILY 90 Tablet 3    potassium chloride ER (KLOR-CON) 10 MEQ tablet Take 1 Tablet by mouth every day. 90 Tablet 3    COVID-19 mRNA Vac-Lisa,Pfizer, (COMIRNATY) 30 MCG/0.3ML Suspension Prefilled Syringe injection Inject  into the shoulder, thigh, or buttocks. 0.3 mL 0    influenza vaccine (FLUZONE) 0.5 ML Suspension Prefilled Syringe injection Inject  into the shoulder, thigh, or buttocks. 0.5 mL 0     No current Epic-ordered facility-administered medications on file.       Allergies:  Codeine    Health Maintenance: Completed      Objective:     Vital signs reviewed  Exam:  /68 (BP Location: Left arm, Patient Position: Sitting, BP Cuff Size: Adult)   Pulse 66   Temp 36.9 °C (98.5 °F) (Temporal)   Ht 1.575 m (5' 2\")   Wt 58.5 kg (129 lb)   SpO2 96%   BMI 23.59 kg/m²  Body mass index is 23.59 kg/m².    Gen: Alert and oriented, No apparent distress.  Eyes:   Lids normal. Glasses in place.   Lungs: Normal effort, CTA bilaterally, no wheezes, rhonchi, or rales  CV: Regular rate and rhythm with systolic murmur. No rubs or gallops.      Assessment & Plan:     64 y.o. female with the following -     1. Insomnia, unspecified type  Chronic stable problem.  Continue trazodone 100 mg at bedtime as needed.  If she is having increased drowsiness discussed to decrease dose to 50 mg " at bedtime as needed.  - traZODone (DESYREL) 100 MG Tab; Take 1 Tablet by mouth at bedtime as needed for Sleep.  Dispense: 90 Tablet; Refill: 3    2. Essential hypertension  Chronic stable problem.  Continue enalapril 10 mg daily and hydrochlorothiazide 25 mg daily.  - enalapril (VASOTEC) 10 MG Tab; Take 1 Tablet by mouth every day.  Dispense: 90 Tablet; Refill: 3  - hydroCHLOROthiazide 25 MG Tab; Take 1 Tablet by mouth every day.  Dispense: 90 Tablet; Refill: 3    3. AMREN (generalized anxiety disorder)  Chronic stable problem.  Continue fluoxetine 40 mg daily.  - fluoxetine (PROZAC) 40 MG capsule; Take 1 Capsule by mouth every day.  Dispense: 90 Capsule; Refill: 3    4. Controlled type 2 diabetes mellitus without complication, without long-term current use of insulin (HCC)  Chronic stable problem.  Continue follow-up with endocrinology.  Continue atorvastatin 20 mg daily, empagliflozin 10 mg daily, metformin ER 1000 mg twice daily and Ozempic 1 mg every 7 days.    5. Hypokalemia  Chronic stable problem.  Continue potassium chloride 10 mEq daily.  Patient is on hydrochlorothiazide for BP.  - potassium chloride ER (KLOR-CON) 10 MEQ tablet; Take 1 Tablet by mouth every day.  Dispense: 90 Tablet; Refill: 3      Return in about 6 months (around 7/23/2025) for annual.    Please note that this dictation was created using voice recognition software. I have made every reasonable attempt to correct obvious errors, but I expect that there are errors of grammar and possibly content that I did not discover before finalizing the note.

## 2025-02-24 ENCOUNTER — TELEPHONE (OUTPATIENT)
Dept: PHARMACY | Facility: MEDICAL CENTER | Age: 65
End: 2025-02-24
Payer: COMMERCIAL

## 2025-02-24 PROCEDURE — RXMED WILLOW AMBULATORY MEDICATION CHARGE: Performed by: INTERNAL MEDICINE

## 2025-02-24 NOTE — TELEPHONE ENCOUNTER
Contact:  Phone number:[phone] (mobile)    Name of person spoken with and relationship to patient: Koki patient   Patient’s Adherence:  How patient is doing on medication: Very Well    How many missed doses and reason: 0 N/A    Any new medications: No    Any new conditions: No    Any new allergies: No    Any new side effects: No    Any new diagnoses: No    How many doses remainin    Did patient want to speak with pharmacist: No   Delivery:  Delivery date and method: 2025 via Mail    Needs by Date: 2025    Signature required: No     Any additional details for : N/A   Teach Appointment Date:  N/A   Shipping Address:  [address] [place] [address] [address] [place] [id number]   Medication(name,strength and dose):  JARDIANCE TABLET 10 MG, OZEMPIC (1 MG/DOSE) SOLUTION PEN-INJECTOR 4 MG/3ML    Copay:  $60.00   Payment Method:  Credit card on file   Supplies:  Alcohol Swabs   Additional Information:  NONE     Masha Dunn, Pharmacy Liaison/ RX Coordinator

## 2025-02-25 ENCOUNTER — PHARMACY VISIT (OUTPATIENT)
Dept: PHARMACY | Facility: MEDICAL CENTER | Age: 65
End: 2025-02-25
Payer: COMMERCIAL

## 2025-04-08 ENCOUNTER — HOSPITAL ENCOUNTER (OUTPATIENT)
Dept: RADIOLOGY | Facility: MEDICAL CENTER | Age: 65
End: 2025-04-08
Attending: NURSE PRACTITIONER
Payer: COMMERCIAL

## 2025-04-08 DIAGNOSIS — R92.333 HETEROGENEOUSLY DENSE TISSUE OF BOTH BREASTS ON MAMMOGRAPHY: ICD-10-CM

## 2025-04-08 DIAGNOSIS — Z12.31 VISIT FOR SCREENING MAMMOGRAM: ICD-10-CM

## 2025-04-08 PROCEDURE — 76641 ULTRASOUND BREAST COMPLETE: CPT

## 2025-04-08 PROCEDURE — 77067 SCR MAMMO BI INCL CAD: CPT

## 2025-04-17 ENCOUNTER — RESULTS FOLLOW-UP (OUTPATIENT)
Dept: MEDICAL GROUP | Facility: PHYSICIAN GROUP | Age: 65
End: 2025-04-17

## 2025-04-28 ENCOUNTER — TELEPHONE (OUTPATIENT)
Dept: ENDOCRINOLOGY | Facility: MEDICAL CENTER | Age: 65
End: 2025-04-28
Payer: COMMERCIAL

## 2025-04-28 NOTE — TELEPHONE ENCOUNTER
Left a detailed message to inform pt Juana Pereira will be OOO 7/21 and we r/s their appt to 7/28 @ 4:20pm. Requested a call back if needed to r/s

## 2025-05-19 ENCOUNTER — TELEPHONE (OUTPATIENT)
Dept: PHARMACY | Facility: MEDICAL CENTER | Age: 65
End: 2025-05-19
Payer: COMMERCIAL

## 2025-05-19 PROCEDURE — RXMED WILLOW AMBULATORY MEDICATION CHARGE: Performed by: INTERNAL MEDICINE

## 2025-05-19 NOTE — TELEPHONE ENCOUNTER
Contact:  Phone number:[phone] (mobile)    Name of person spoken with and relationship to patient: Koki patient   Patient’s Adherence:  How patient is doing on medication: Very Well    How many missed doses and reason: 0 N/A    Any new medications: No    Any new conditions: No    Any new allergies: No    Any new side effects: No    Any new diagnoses: No    How many doses remainin    Did patient want to speak with pharmacist: No   Delivery:  Delivery date and method: 2025 via     Needs by Date: 2025    Signature required: No     Any additional details for : N/A   Teach Appointment Date:  N/A   Shipping Address:  [address] [place] [address] [address] [place] [id number]   Medication(name,strength and dose):  Jardiance Tablet 10 MG Ozempic (1 MG/DOSE) Solution Pen-injector 4 MG/3ML    Copay:  $85.00   Payment Method:  Credit card on file   Supplies:  Alcohol Swabs   Additional Information:  NONE       Masha Dunn, Pharmacy Liaison/ RX Coordinator

## 2025-05-20 ENCOUNTER — PHARMACY VISIT (OUTPATIENT)
Dept: PHARMACY | Facility: MEDICAL CENTER | Age: 65
End: 2025-05-20
Payer: COMMERCIAL

## 2025-07-20 SDOH — ECONOMIC STABILITY: FOOD INSECURITY: WITHIN THE PAST 12 MONTHS, YOU WORRIED THAT YOUR FOOD WOULD RUN OUT BEFORE YOU GOT MONEY TO BUY MORE.: SOMETIMES TRUE

## 2025-07-20 SDOH — HEALTH STABILITY: PHYSICAL HEALTH: ON AVERAGE, HOW MANY MINUTES DO YOU ENGAGE IN EXERCISE AT THIS LEVEL?: 60 MIN

## 2025-07-20 SDOH — ECONOMIC STABILITY: INCOME INSECURITY: HOW HARD IS IT FOR YOU TO PAY FOR THE VERY BASICS LIKE FOOD, HOUSING, MEDICAL CARE, AND HEATING?: NOT VERY HARD

## 2025-07-20 SDOH — ECONOMIC STABILITY: FOOD INSECURITY: WITHIN THE PAST 12 MONTHS, THE FOOD YOU BOUGHT JUST DIDN'T LAST AND YOU DIDN'T HAVE MONEY TO GET MORE.: SOMETIMES TRUE

## 2025-07-20 SDOH — ECONOMIC STABILITY: INCOME INSECURITY: IN THE LAST 12 MONTHS, WAS THERE A TIME WHEN YOU WERE NOT ABLE TO PAY THE MORTGAGE OR RENT ON TIME?: NO

## 2025-07-20 SDOH — HEALTH STABILITY: PHYSICAL HEALTH: ON AVERAGE, HOW MANY DAYS PER WEEK DO YOU ENGAGE IN MODERATE TO STRENUOUS EXERCISE (LIKE A BRISK WALK)?: 6 DAYS

## 2025-07-20 ASSESSMENT — SOCIAL DETERMINANTS OF HEALTH (SDOH)
IN A TYPICAL WEEK, HOW MANY TIMES DO YOU TALK ON THE PHONE WITH FAMILY, FRIENDS, OR NEIGHBORS?: THREE TIMES A WEEK
HOW OFTEN DO YOU HAVE SIX OR MORE DRINKS ON ONE OCCASION: NEVER
HOW OFTEN DO YOU ATTENT MEETINGS OF THE CLUB OR ORGANIZATION YOU BELONG TO?: NEVER
WITHIN THE PAST 12 MONTHS, YOU WORRIED THAT YOUR FOOD WOULD RUN OUT BEFORE YOU GOT THE MONEY TO BUY MORE: SOMETIMES TRUE
HOW OFTEN DO YOU ATTENT MEETINGS OF THE CLUB OR ORGANIZATION YOU BELONG TO?: NEVER
HOW HARD IS IT FOR YOU TO PAY FOR THE VERY BASICS LIKE FOOD, HOUSING, MEDICAL CARE, AND HEATING?: NOT VERY HARD
IN THE PAST 12 MONTHS, HAS THE ELECTRIC, GAS, OIL, OR WATER COMPANY THREATENED TO SHUT OFF SERVICE IN YOUR HOME?: NO
IN A TYPICAL WEEK, HOW MANY TIMES DO YOU TALK ON THE PHONE WITH FAMILY, FRIENDS, OR NEIGHBORS?: THREE TIMES A WEEK
HOW OFTEN DO YOU ATTEND CHURCH OR RELIGIOUS SERVICES?: NEVER
HOW OFTEN DO YOU ATTEND CHURCH OR RELIGIOUS SERVICES?: NEVER
DO YOU BELONG TO ANY CLUBS OR ORGANIZATIONS SUCH AS CHURCH GROUPS UNIONS, FRATERNAL OR ATHLETIC GROUPS, OR SCHOOL GROUPS?: NO
HOW MANY DRINKS CONTAINING ALCOHOL DO YOU HAVE ON A TYPICAL DAY WHEN YOU ARE DRINKING: PATIENT DOES NOT DRINK
HOW OFTEN DO YOU HAVE A DRINK CONTAINING ALCOHOL: NEVER
DO YOU BELONG TO ANY CLUBS OR ORGANIZATIONS SUCH AS CHURCH GROUPS UNIONS, FRATERNAL OR ATHLETIC GROUPS, OR SCHOOL GROUPS?: NO
HOW OFTEN DO YOU GET TOGETHER WITH FRIENDS OR RELATIVES?: TWICE A WEEK
HOW OFTEN DO YOU GET TOGETHER WITH FRIENDS OR RELATIVES?: TWICE A WEEK

## 2025-07-20 ASSESSMENT — LIFESTYLE VARIABLES
HOW OFTEN DO YOU HAVE A DRINK CONTAINING ALCOHOL: NEVER
HOW MANY STANDARD DRINKS CONTAINING ALCOHOL DO YOU HAVE ON A TYPICAL DAY: PATIENT DOES NOT DRINK
HOW OFTEN DO YOU HAVE SIX OR MORE DRINKS ON ONE OCCASION: NEVER
SKIP TO QUESTIONS 9-10: 1
AUDIT-C TOTAL SCORE: 0

## 2025-07-23 ENCOUNTER — OFFICE VISIT (OUTPATIENT)
Dept: MEDICAL GROUP | Facility: PHYSICIAN GROUP | Age: 65
End: 2025-07-23
Payer: COMMERCIAL

## 2025-07-23 ENCOUNTER — HOSPITAL ENCOUNTER (OUTPATIENT)
Facility: MEDICAL CENTER | Age: 65
End: 2025-07-23
Attending: NURSE PRACTITIONER
Payer: COMMERCIAL

## 2025-07-23 VITALS
HEART RATE: 77 BPM | BODY MASS INDEX: 23 KG/M2 | WEIGHT: 125 LBS | DIASTOLIC BLOOD PRESSURE: 52 MMHG | OXYGEN SATURATION: 95 % | HEIGHT: 62 IN | SYSTOLIC BLOOD PRESSURE: 90 MMHG | TEMPERATURE: 97.8 F

## 2025-07-23 DIAGNOSIS — E87.6 HYPOKALEMIA: ICD-10-CM

## 2025-07-23 DIAGNOSIS — E89.0 POSTOPERATIVE HYPOTHYROIDISM: ICD-10-CM

## 2025-07-23 DIAGNOSIS — E11.9 CONTROLLED TYPE 2 DIABETES MELLITUS WITHOUT COMPLICATION, WITHOUT LONG-TERM CURRENT USE OF INSULIN (HCC): ICD-10-CM

## 2025-07-23 DIAGNOSIS — I10 ESSENTIAL HYPERTENSION: ICD-10-CM

## 2025-07-23 DIAGNOSIS — F41.1 GENERALIZED ANXIETY DISORDER: ICD-10-CM

## 2025-07-23 DIAGNOSIS — E78.5 DYSLIPIDEMIA: ICD-10-CM

## 2025-07-23 DIAGNOSIS — G47.00 INSOMNIA, UNSPECIFIED TYPE: Chronic | ICD-10-CM

## 2025-07-23 DIAGNOSIS — Z00.01 ANNUAL VISIT FOR GENERAL ADULT MEDICAL EXAMINATION WITH ABNORMAL FINDINGS: Primary | ICD-10-CM

## 2025-07-23 LAB
HBA1C MFR BLD: 5.4 % (ref ?–5.8)
POCT INT CON NEG: NEGATIVE
POCT INT CON POS: POSITIVE

## 2025-07-23 PROCEDURE — 82570 ASSAY OF URINE CREATININE: CPT

## 2025-07-23 PROCEDURE — 3078F DIAST BP <80 MM HG: CPT | Performed by: NURSE PRACTITIONER

## 2025-07-23 PROCEDURE — 3074F SYST BP LT 130 MM HG: CPT | Performed by: NURSE PRACTITIONER

## 2025-07-23 PROCEDURE — 99396 PREV VISIT EST AGE 40-64: CPT | Performed by: NURSE PRACTITIONER

## 2025-07-23 PROCEDURE — 83036 HEMOGLOBIN GLYCOSYLATED A1C: CPT | Performed by: NURSE PRACTITIONER

## 2025-07-23 PROCEDURE — 82043 UR ALBUMIN QUANTITATIVE: CPT

## 2025-07-23 NOTE — ASSESSMENT & PLAN NOTE
BP today 90/52.  Repeat BP similar 90/50.  Continues enalapril 10 mg daily and hydrochlorothiazide 25 mg daily.  No home BP monitoring.  She is not having any chest pain, shortness of breath, dizziness, blurry vision or headaches.  She has had weight loss since the beginning of the year, she is on Ozempic for her diabetes.  With the lower BP reading and weight loss we will stop her hydrochlorothiazide and potassium chloride.  She will continue with enalapril 10 mg daily.  Follow-up in 1 month.

## 2025-07-23 NOTE — PROGRESS NOTES
Subjective:     CC:   Chief Complaint   Patient presents with    Annual Exam       HPI:   Koki Townsend is a 64 y.o. female who presents for annual exam. She is feeling well and denies any complaints.      Controlled type 2 diabetes mellitus without complication, without long-term current use of insulin ([facility])  Previous A1c 5.8%.  A1c today 5.4%.  Continues metformin ER 1000 mg twice daily, Jardiance 10 mg daily and Ozempic 1 mg weekly.    Essential hypertension  BP today 90/52.  Repeat BP similar 90/50.  Continues enalapril 10 mg daily and hydrochlorothiazide 25 mg daily.  No home BP monitoring.  She is not having any chest pain, shortness of breath, dizziness, blurry vision or headaches.  She has had weight loss since the beginning of the year, she is on Ozempic for her diabetes.  With the lower BP reading and weight loss we will stop her hydrochlorothiazide and potassium chloride.  She will continue with enalapril 10 mg daily.  Follow-up in 1 month.    Insomnia  Continues trazodone 100 mg at bedtime. Some days the medication works and other days does not work as well.  She did have difficulty turning off her thoughts.    Dyslipidemia  Has upcoming labs ordered.  Continues atorvastatin 20 mg daily.    ARMEN (generalized anxiety disorder)  Continues fluoxetine 40 mg daily.    Hypokalemia  Stopping potassium chloride and hydrochlorothiazide due to low BP.    Hypothyroidism  Upcoming labs are ordered.  Continues levothyroxine 100 mcg daily on empty stomach with 1 day off.      Ob-Gyn/ History:    Patient has GYN provider: no  /Para:  5/3  Last Pap Smear:  2021. No history of abnormal pap smears.  Gyn Surgery:  tubal ligation.  Current Contraceptive Method:  postmenopausal. Not currently sexually active.  No significant bloating/fluid retention, pelvic pain. No vaginal discharge  Post-menopausal bleeding: none  Urinary incontinence: none    Health Maintenance  Cholesterol Screening: labs are ordered    Diabetes Screening: A1C today  Diet: She is eating a healthy diet  Exercise: Walking 5 miles a day   Substance Abuse: discussed and reviewed    Not in relationship.   Seat belts safety discussed.  Sun protection used.  Has established eye doctor and dental home (Dr. Swartz).     Cancer screening  Colorectal Cancer Screenin/10/2023 with 5 year recall    Lung Cancer Screening: Non-smoker   Cervical Cancer Screenin2021, continue Q5 years    Breast Cancer Screenin2025     Infectious disease screening/Immunizations  --Practices safe sex.  --HIV Screenin/10/2025   --Hepatitis C Screening: 10/25/2021   --Immunizations:    Influenza: 2024    Tetanus: 2015    Shingles: Completed series   Pneumococcal : Completed   Other immunizations: COVID booster up-to-date.      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    She has no past medical history of Depression.  She  has a past surgical history that includes thyroidectomy (Left, 's); sinuscope; tonsillectomy; and tubal ligation.    Family History   Problem Relation Age of Onset    Heart Failure Mother     Diabetes Mother     Cancer Father         lung-abest    Lung Cancer Father     Diabetes Sister     Sleep Apnea Sister     Heart Disease Sister     Sleep Apnea Brother     No Known Problems Brother     Heart Disease Maternal Grandmother         heart valve    Cancer Paternal Grandmother         liver    Stroke Neg Hx        Social History     Socioeconomic History    Marital status: Single     Spouse name: Not on file    Number of children: Not on file    Years of education: Not on file    Highest education level: 12th grade   Occupational History    Not on file    Tobacco Use    Smoking status: Never    Smokeless tobacco: Never   Vaping Use    Vaping status: Never Used   Substance and Sexual Activity    Alcohol use: No     Alcohol/week: 0.0 oz    Drug use: Not Currently    Sexual activity: Not Currently     Partners: Male     Birth control/protection: Surgical   Other Topics Concern    Not on file   Social History Narrative    Not on file     Social Drivers of Health     Financial Resource Strain: Low Risk  (7/20/2025)    Overall Financial Resource Strain (CARDIA)     Difficulty of Paying Living Expenses: Not very hard   Food Insecurity: Food Insecurity Present (7/20/2025)    Hunger Vital Sign     Worried About Running Out of Food in the Last Year: Sometimes true     Ran Out of Food in the Last Year: Sometimes true   Transportation Needs: No Transportation Needs (7/20/2025)    PRAPARE - Transportation     Lack of Transportation (Medical): No     Lack of Transportation (Non-Medical): No   Physical Activity: Sufficiently Active (7/20/2025)    Exercise Vital Sign     Days of Exercise per Week: 6 days     Minutes of Exercise per Session: 60 min   Stress: No Stress Concern Present (7/20/2025)    [place] [employer] - Occupational Stress Questionnaire     Feeling of Stress : Not at all   Social Connections: Socially Isolated (7/20/2025)    Social Connection and Isolation Panel [NHANES]     Frequency of Communication with Friends and Family: Three times a week     Frequency of Social Gatherings with Friends and Family: Twice a week     Attends Protestant Services: Never     Active Member of Clubs or Organizations: No     Attends Club or Organization Meetings: Never     Marital Status:    Intimate Partner Violence: Not on file   Housing Stability: Low Risk  (7/20/2025)    Housing Stability Vital Sign     Unable to Pay for Housing in the Last Year: No     Number of Times Moved in the Last Year: 0     Homeless in the Last Year: No       Patient Active  "Problem List    Diagnosis Date Noted    Weight loss 07/09/2024    Systolic murmur 06/20/2024    Heterogeneously dense tissue of both breasts on mammography 06/20/2024    Positive screening for depression on 9-item Patient Health Questionnaire (PHQ-9) 09/19/2023    Lump of skin of lower extremity, right 12/01/2022    Hypokalemia 05/13/2022    Long term (current) use of oral hypoglycemic drugs 11/05/2021    Dyslipidemia 09/16/2020    Controlled type 2 diabetes mellitus without complication, without long-term current use of insulin (HCC) 07/03/2020    ARMEN (generalized anxiety disorder) 01/04/2018    Hypothyroidism 07/31/2017    Insomnia 02/28/2017    Obstructive sleep apnea 01/25/2017    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   HENT: Negative for congestion.    Eyes: Negative for pain.   Respiratory: Negative for cough and shortness of breath.    Cardiovascular: Negative for leg swelling.   Gastrointestinal: Negative for nausea, vomiting, abdominal pain and diarrhea.   Genitourinary: Negative for dysuria and hematuria.   Skin: Negative for rash.   Neurological: Negative for dizziness and headaches.   Endo/Heme/Allergies: Does not bruise/bleed easily.   Psychiatric/Behavioral: Negative for depression.  The patient is not nervous/anxious.      Objective:     Vital signs reviewed  BP 90/52 (BP Location: Right arm, Patient Position: Sitting, BP Cuff Size: Adult)   Pulse 77   Temp 36.6 °C (97.8 °F) (Temporal)   Ht 1.575 m (5' 2\")   Wt 56.7 kg (125 lb)   SpO2 95%   BMI 22.86 kg/m²   Body mass index is 22.86 kg/m².  Wt Readings from Last 4 Encounters:   07/23/25 56.7 kg (125 lb)   01/23/25 58.5 kg (129 lb)   01/16/25 59.9 kg (132 lb)   10/24/24 59 kg (130 lb) "       Physical Exam:  Constitutional: Well-developed and well-nourished. Not diaphoretic. No distress.   Skin: Skin is warm and dry. No rash noted.  Head: Atraumatic without lesions.  Eyes: Conjunctivae and extraocular motions are normal. Pupils are equal, round, and reactive to light. No scleral icterus.  Wears prescription glasses.  Ears:  External ears unremarkable. Tympanic membranes clear and intact.  Nose: Nares patent. Septum midline. Turbinates without erythema nor edema. No discharge.   Mouth/Throat: Dentition is intact. Tongue normal. Oropharynx is clear and moist. Posterior pharynx without erythema or exudates.  Neck: Supple, trachea midline. Normal range of motion. No lymphadenopathy--cervical or supraclavicular.  Cardiovascular: Regular rate and rhythm, S1 and S2 with systolic murmur.  No rubs or gallops.  Lungs: Normal inspiratory effort, CTA bilaterally, no wheezes/rhonchi/rales  Abdomen: Soft, non tender, and without distention. Active bowel sounds in all four quadrants. No rebound, guarding, masses or HSM.  Extremities: No cyanosis, clubbing, erythema, nor edema. Distal pulses intact and symmetric.   Musculoskeletal: All major joints AROM full in all directions without pain.  Neurological: Alert and oriented x 3.   Psychiatric:  Behavior, mood, and affect are appropriate.      Monofilament testing with a 10 gram force: sensation intact: intact bilaterally  Visual Inspection: Feet without maceration, ulcers, fissures.  Pedal pulses: intact bilaterally      Lab:     Latest Reference Range & Units 07/23/25 16:07   Glycohemoglobin <=5.8 % 5.4       Assessment and Plan:     1. Annual visit for general adult medical examination with abnormal findings (Primary)  Acute uncomplicated problem.  Annual exam completed today. Discussion today about general wellness and lifestyle habits:  Engage in regular physical and social activities  Skin care, including sunscreen  Recommended annual eye exams and annual  dental exams  Discussed wearing seatbelt when in car at all times     2. Essential hypertension  Chronic exacerbated problem.  BP on the low side today.  She is asymptomatic.  With her weight loss and low BP we will stop hydrochlorothiazide and potassium chloride.  She will continue with enalapril 10 mg daily.  She has a home BP cuff and will start home BP monitoring.  Return in 1 month.    3. Controlled type 2 diabetes mellitus without complication, without long-term current use of insulin (HCC)  Chronic stable problem.  A1c controlled today at 5.4%.  Keep upcoming endocrinology appointment.  Health maintenance completed today.  Continue Jardiance 10 mg daily, metformin ER 1000 mg twice daily and Ozempic 1 mg every 7 days.  - Microalbumin Creat Ratio Urine (Clinic Collect); Future  - POCT A1C  - Diabetic Monofilament LE Exam    4. Dyslipidemia  Chronic stable problem.  Continue atorvastatin 20 mg daily.    5. Insomnia, unspecified type  Chronic stable problem.  Continue trazodone 100 mg nightly as needed.      6. ARMEN (generalized anxiety disorder)  Chronic stable problem.  Continue fluoxetine 40 mg daily.    7. Postoperative hypothyroidism  Chronic stable problem.  Continue levothyroxine 100 mcg daily on empty stomach 6 days a week with 1 day off.    8. Hypokalemia  Chronic exacerbated problem.  Stop potassium chloride as we are stopping hydrochlorothiazide.      HCM: Diabetes health maintenance completed today.  Patient has upcoming appointment for her lipid panel and CMP.  Labs per orders  Immunizations per orders  Patient counseled about skin care, diet, supplements, prenatal vitamins, safe sex and exercise.    Follow-up: Return in about 4 weeks (around 8/20/2025) for Hypertension.    Please note that this dictation was created using voice recognition software. I have made every reasonable attempt to correct obvious errors, but I expect that there are errors of grammar and possibly content that I did not  discover before finalizing the note.         [1]   Current Outpatient Medications   Medication Sig Dispense Refill    enalapril (VASOTEC) 10 MG Tab Take 1 Tablet by mouth every day. 90 Tablet 3    fluoxetine (PROZAC) 40 MG capsule Take 1 Capsule by mouth every day. 90 Capsule 3    traZODone (DESYREL) 100 MG Tab Take 1 Tablet by mouth at bedtime as needed for Sleep. 90 Tablet 3    Semaglutide, 1 MG/DOSE, (OZEMPIC, 1 MG/DOSE,) 4 MG/3ML Solution Pen-injector Inject 1 mg under the skin every 7 days. 9 mL 2    metFORMIN ER (GLUCOPHAGE XR) 500 MG TABLET SR 24 HR Take 2 Tablets by mouth 2 times a day with meals. 360 Tablet 3    Empagliflozin (JARDIANCE) 10 MG Tab tablet Take 1 tablet by mouth every day. 90 Tablet 3    levothyroxine (SYNTHROID) 100 MCG Tab Take 1 Tablet by mouth every morning on an empty stomach. But skip Sunday 90 Tablet 3    atorvastatin (LIPITOR) 20 MG Tab TAKE ONE TABLET BY MOUTH ONCE DAILY 90 Tablet 3     No current facility-administered medications for this visit.   [2]   Allergies  Allergen Reactions    Codeine Itching

## 2025-07-23 NOTE — ASSESSMENT & PLAN NOTE
Continues trazodone 100 mg at bedtime. Some days the medication works and other days does not work as well.  She did have difficulty turning off her thoughts.

## 2025-07-23 NOTE — ASSESSMENT & PLAN NOTE
Previous A1c 5.8%.  A1c today 5.4%.  Continues metformin ER 1000 mg twice daily, Jardiance 10 mg daily and Ozempic 1 mg weekly.

## 2025-07-24 ENCOUNTER — APPOINTMENT (OUTPATIENT)
Dept: LAB | Facility: MEDICAL CENTER | Age: 65
End: 2025-07-24
Payer: COMMERCIAL

## 2025-07-24 DIAGNOSIS — E89.0 POSTOPERATIVE HYPOTHYROIDISM: ICD-10-CM

## 2025-07-24 DIAGNOSIS — E78.5 DYSLIPIDEMIA: ICD-10-CM

## 2025-07-24 DIAGNOSIS — E11.9 CONTROLLED TYPE 2 DIABETES MELLITUS WITHOUT COMPLICATION, WITHOUT LONG-TERM CURRENT USE OF INSULIN (HCC): ICD-10-CM

## 2025-07-24 DIAGNOSIS — E55.9 VITAMIN D DEFICIENCY: ICD-10-CM

## 2025-07-24 DIAGNOSIS — Z79.84 LONG TERM (CURRENT) USE OF ORAL HYPOGLYCEMIC DRUGS: ICD-10-CM

## 2025-07-24 LAB
25(OH)D3 SERPL-MCNC: 66 NG/ML (ref 30–100)
ALBUMIN SERPL BCP-MCNC: 4 G/DL (ref 3.2–4.9)
ALBUMIN/GLOB SERPL: 1.8 G/DL
ALP SERPL-CCNC: 85 U/L (ref 30–99)
ALT SERPL-CCNC: 38 U/L (ref 2–50)
ANION GAP SERPL CALC-SCNC: 10 MMOL/L (ref 7–16)
AST SERPL-CCNC: 36 U/L (ref 12–45)
BILIRUB SERPL-MCNC: 0.6 MG/DL (ref 0.1–1.5)
BUN SERPL-MCNC: 25 MG/DL (ref 8–22)
CALCIUM ALBUM COR SERPL-MCNC: 10 MG/DL (ref 8.5–10.5)
CALCIUM SERPL-MCNC: 10 MG/DL (ref 8.5–10.5)
CHLORIDE SERPL-SCNC: 108 MMOL/L (ref 96–112)
CHOLEST SERPL-MCNC: 98 MG/DL (ref 100–199)
CO2 SERPL-SCNC: 22 MMOL/L (ref 20–33)
CREAT SERPL-MCNC: 1.11 MG/DL (ref 0.5–1.4)
CREAT UR-MCNC: 111 MG/DL
CREAT UR-MCNC: 76 MG/DL
GFR SERPLBLD CREATININE-BSD FMLA CKD-EPI: 55 ML/MIN/1.73 M 2
GLOBULIN SER CALC-MCNC: 2.2 G/DL (ref 1.9–3.5)
GLUCOSE SERPL-MCNC: 103 MG/DL (ref 65–99)
HDLC SERPL-MCNC: 48 MG/DL
LDLC SERPL CALC-MCNC: 33 MG/DL
MICROALBUMIN UR-MCNC: <1.2 MG/DL
MICROALBUMIN UR-MCNC: <1.2 MG/DL
MICROALBUMIN/CREAT UR: NORMAL MG/G (ref 0–30)
MICROALBUMIN/CREAT UR: NORMAL MG/G (ref 0–30)
POTASSIUM SERPL-SCNC: 3.9 MMOL/L (ref 3.6–5.5)
PROT SERPL-MCNC: 6.2 G/DL (ref 6–8.2)
SODIUM SERPL-SCNC: 140 MMOL/L (ref 135–145)
T4 FREE SERPL-MCNC: 1.61 NG/DL (ref 0.93–1.7)
TRIGL SERPL-MCNC: 84 MG/DL (ref 0–149)
TSH SERPL-ACNC: 0.76 UIU/ML (ref 0.38–5.33)

## 2025-07-24 PROCEDURE — 80053 COMPREHEN METABOLIC PANEL: CPT

## 2025-07-24 PROCEDURE — 82043 UR ALBUMIN QUANTITATIVE: CPT

## 2025-07-24 PROCEDURE — 80061 LIPID PANEL: CPT

## 2025-07-24 PROCEDURE — 84443 ASSAY THYROID STIM HORMONE: CPT

## 2025-07-24 PROCEDURE — 84439 ASSAY OF FREE THYROXINE: CPT

## 2025-07-24 PROCEDURE — 36415 COLL VENOUS BLD VENIPUNCTURE: CPT

## 2025-07-24 PROCEDURE — 82570 ASSAY OF URINE CREATININE: CPT

## 2025-07-24 PROCEDURE — 82306 VITAMIN D 25 HYDROXY: CPT

## 2025-07-24 NOTE — ASSESSMENT & PLAN NOTE
Upcoming labs are ordered.  Continues levothyroxine 100 mcg daily on empty stomach with 1 day off.

## 2025-07-28 ENCOUNTER — NON-PROVIDER VISIT (OUTPATIENT)
Dept: ENDOCRINOLOGY | Facility: MEDICAL CENTER | Age: 65
End: 2025-07-28
Attending: INTERNAL MEDICINE
Payer: COMMERCIAL

## 2025-07-28 VITALS
WEIGHT: 130 LBS | SYSTOLIC BLOOD PRESSURE: 112 MMHG | HEIGHT: 62 IN | HEART RATE: 68 BPM | DIASTOLIC BLOOD PRESSURE: 70 MMHG | OXYGEN SATURATION: 98 % | BODY MASS INDEX: 23.92 KG/M2

## 2025-07-28 DIAGNOSIS — E11.9 CONTROLLED TYPE 2 DIABETES MELLITUS WITHOUT COMPLICATION, WITHOUT LONG-TERM CURRENT USE OF INSULIN (HCC): Primary | ICD-10-CM

## 2025-07-28 PROCEDURE — 99213 OFFICE O/P EST LOW 20 MIN: CPT | Performed by: INTERNAL MEDICINE

## 2025-07-28 RX ORDER — SEMAGLUTIDE 2.68 MG/ML
2 INJECTION, SOLUTION SUBCUTANEOUS
Qty: 9 ML | Refills: 3 | Status: SHIPPED | OUTPATIENT
Start: 2025-07-28

## 2025-07-28 RX ORDER — CHOLECALCIFEROL (VITAMIN D3) 50 MCG
2000 TABLET ORAL DAILY
COMMUNITY

## 2025-07-28 NOTE — PROGRESS NOTES
RN-CDE Note    Subjective:   Endocrinology Clinic Progress Note  PCP: DES Dye    HPI:  Koki Townsend is a 64 y.o. old patient who is seen today by the Diabetes Nurse Specialist for review of Type 2 Diabetes and Hypothyroidism.  Recent changes in health: Health good.  DM:   Last A1c:   Lab Results   Component Value Date/Time    HBA1C 5.4 07/23/2025 04:07 PM      Previous A1c was 5.8 on 1/16/25  A1C GOAL: < 7    Diabetes Medications:   Ozempic 1 mg weekly  Jardiance 10 mg daily  Metformin  mg 2 BID         Exercise: Walking 4-5 miles daily  Diet: Fruit, Vegetables, carbohydrates, and protein.     Exercise and nutrition counseling were performed at this visit.    Glucose monitoring frequency: not testing    Hypoglycemic episodes: no     Last Retinal Exam: on file and up-to-date  Daily Foot Exam: Yes   Foot Exam:  Monofilament: done  Monofilament testing with a 10 gram force: sensation intact: intact bilaterally  Visual Inspection: Feet without maceration, ulcers, fissures.  Pedal pulses: intact bilaterally   Lab Results   Component Value Date/Time    MALBCRT see below 07/24/2025 07:35 AM    MICROALBUR <1.2 07/24/2025 07:35 AM        ACR Albumin/Creatinine Ratio goal <30     HTN:   Blood pressure goal <130/<80   Currently Rx ACE/ARB:  yes    Dyslipidemia:    Lab Results   Component Value Date/Time    CHOLSTRLTOT 98 (L) 07/24/2025 07:35 AM    LDL 33 07/24/2025 07:35 AM    HDL 48 07/24/2025 07:35 AM    TRIGLYCERIDE 84 07/24/2025 07:35 AM         Currently Rx Statin:  yes    She  reports that she has never smoked. She has never used smokeless tobacco.    Plan:     Discussed and educated on:   - All medications, side effects and compliance (discussed carefully)  - Annual eye examinations at Ophthalmology  - Home glucose monitoring emphasized  - Weight control and daily exercise    Recommended medication changes: She would like to try going up on her Ozempic. The Ozempic 2 mg pen was prescribed  and she will titrate up as needed until she gets to 2 mg weekly.  She will continue Jardiance 10 mg daily, and Metformin  mg 2 BID.  She has a follow up appointment with Dr. Bourne on 1/22/26.

## 2025-08-06 ENCOUNTER — SPECIALTY PHARMACY (OUTPATIENT)
Dept: ENDOCRINOLOGY | Facility: MEDICAL CENTER | Age: 65
End: 2025-08-06
Payer: COMMERCIAL

## 2025-08-06 PROCEDURE — RXMED WILLOW AMBULATORY MEDICATION CHARGE: Performed by: INTERNAL MEDICINE

## 2025-08-11 ENCOUNTER — PHARMACY VISIT (OUTPATIENT)
Dept: PHARMACY | Facility: MEDICAL CENTER | Age: 65
End: 2025-08-11
Payer: COMMERCIAL

## 2025-08-15 ENCOUNTER — SPECIALTY PHARMACY (OUTPATIENT)
Dept: ENDOCRINOLOGY | Facility: MEDICAL CENTER | Age: 65
End: 2025-08-15
Payer: COMMERCIAL

## 2025-08-15 PROCEDURE — RXMED WILLOW AMBULATORY MEDICATION CHARGE: Performed by: INTERNAL MEDICINE

## 2025-08-18 DIAGNOSIS — E11.9 CONTROLLED TYPE 2 DIABETES MELLITUS WITHOUT COMPLICATION, WITHOUT LONG-TERM CURRENT USE OF INSULIN (HCC): Primary | ICD-10-CM

## 2025-08-19 ENCOUNTER — PHARMACY VISIT (OUTPATIENT)
Dept: PHARMACY | Facility: MEDICAL CENTER | Age: 65
End: 2025-08-19
Payer: COMMERCIAL

## 2025-09-02 ENCOUNTER — APPOINTMENT (OUTPATIENT)
Dept: MEDICAL GROUP | Facility: PHYSICIAN GROUP | Age: 65
End: 2025-09-02
Payer: COMMERCIAL